# Patient Record
Sex: FEMALE | Race: WHITE | NOT HISPANIC OR LATINO | Employment: OTHER | URBAN - METROPOLITAN AREA
[De-identification: names, ages, dates, MRNs, and addresses within clinical notes are randomized per-mention and may not be internally consistent; named-entity substitution may affect disease eponyms.]

---

## 2020-08-03 ENCOUNTER — OFFICE VISIT (OUTPATIENT)
Dept: INTERNAL MEDICINE | Facility: CLINIC | Age: 40
End: 2020-08-03
Payer: MEDICAID

## 2020-08-03 VITALS
SYSTOLIC BLOOD PRESSURE: 114 MMHG | HEART RATE: 113 BPM | DIASTOLIC BLOOD PRESSURE: 63 MMHG | HEIGHT: 65 IN | WEIGHT: 138 LBS | BODY MASS INDEX: 22.99 KG/M2 | TEMPERATURE: 98 F | OXYGEN SATURATION: 97 %

## 2020-08-03 DIAGNOSIS — Z00.00 ROUTINE GENERAL MEDICAL EXAMINATION AT A HEALTH CARE FACILITY: Primary | ICD-10-CM

## 2020-08-03 DIAGNOSIS — I10 ESSENTIAL HYPERTENSION: ICD-10-CM

## 2020-08-03 DIAGNOSIS — G35 MULTIPLE SCLEROSIS: ICD-10-CM

## 2020-08-03 DIAGNOSIS — M62.838 MUSCLE SPASM: ICD-10-CM

## 2020-08-03 DIAGNOSIS — F33.42 RECURRENT MAJOR DEPRESSIVE DISORDER, IN FULL REMISSION: ICD-10-CM

## 2020-08-03 PROCEDURE — 99204 OFFICE O/P NEW MOD 45 MIN: CPT | Mod: S$GLB,,, | Performed by: INTERNAL MEDICINE

## 2020-08-03 PROCEDURE — 99204 PR OFFICE/OUTPT VISIT, NEW, LEVL IV, 45-59 MIN: ICD-10-PCS | Mod: S$GLB,,, | Performed by: INTERNAL MEDICINE

## 2020-08-03 RX ORDER — BACLOFEN 10 MG/1
TABLET ORAL
COMMUNITY
Start: 2020-07-15 | End: 2020-08-03

## 2020-08-03 RX ORDER — HYDROCHLOROTHIAZIDE 12.5 MG/1
TABLET ORAL
COMMUNITY
Start: 2020-07-18 | End: 2020-08-03

## 2020-08-03 RX ORDER — NATALIZUMAB 300 MG/15ML
INJECTION INTRAVENOUS
COMMUNITY
Start: 2020-07-27 | End: 2020-12-02

## 2020-08-03 RX ORDER — SERTRALINE HYDROCHLORIDE 100 MG/1
200 TABLET, FILM COATED ORAL DAILY
COMMUNITY
Start: 2020-07-03 | End: 2022-03-23

## 2020-08-03 RX ORDER — LAMOTRIGINE 100 MG/1
100 TABLET ORAL EVERY MORNING
COMMUNITY
Start: 2020-06-30

## 2020-08-03 RX ORDER — LISINOPRIL 10 MG/1
TABLET ORAL
COMMUNITY
Start: 2020-07-18 | End: 2020-08-18

## 2020-08-03 RX ORDER — SUMATRIPTAN SUCCINATE 100 MG/1
100 TABLET ORAL
COMMUNITY
Start: 2020-07-15 | End: 2023-04-07 | Stop reason: CLARIF

## 2020-08-03 RX ORDER — IBUPROFEN 800 MG/1
TABLET ORAL
COMMUNITY
Start: 2020-05-28 | End: 2020-12-02 | Stop reason: SDUPTHER

## 2020-08-03 RX ORDER — QUETIAPINE FUMARATE 200 MG/1
TABLET, FILM COATED ORAL
COMMUNITY
Start: 2020-07-28 | End: 2021-01-25 | Stop reason: DRUGHIGH

## 2020-08-03 RX ORDER — VERAPAMIL HYDROCHLORIDE 120 MG/1
120 TABLET, FILM COATED, EXTENDED RELEASE ORAL EVERY MORNING
COMMUNITY
Start: 2020-07-30 | End: 2021-05-11

## 2020-08-03 RX ORDER — TIZANIDINE 4 MG/1
4 TABLET ORAL EVERY 8 HOURS
Qty: 30 TABLET | Refills: 1 | Status: SHIPPED | OUTPATIENT
Start: 2020-08-03 | End: 2020-08-13

## 2020-08-03 RX ORDER — ONDANSETRON 4 MG/1
4 TABLET, ORALLY DISINTEGRATING ORAL EVERY 6 HOURS PRN
COMMUNITY
Start: 2020-05-28 | End: 2022-11-07 | Stop reason: SDUPTHER

## 2020-08-03 RX ORDER — TIZANIDINE 2 MG/1
TABLET ORAL
COMMUNITY
Start: 2020-08-02 | End: 2020-08-03

## 2020-08-03 RX ORDER — SODIUM CHLORIDE 9 MG/ML
INJECTION, SOLUTION INTRAVENOUS
COMMUNITY
Start: 2020-07-27 | End: 2021-01-25

## 2020-08-03 RX ORDER — DOXEPIN HYDROCHLORIDE 75 MG/1
CAPSULE ORAL
COMMUNITY
Start: 2020-07-15 | End: 2020-08-18

## 2020-08-03 RX ORDER — HYDROCODONE BITARTRATE AND ACETAMINOPHEN 5; 325 MG/1; MG/1
1 TABLET ORAL EVERY 6 HOURS PRN
Qty: 30 TABLET | Refills: 0 | Status: SHIPPED | OUTPATIENT
Start: 2020-08-03 | End: 2020-08-18

## 2020-08-03 NOTE — PROGRESS NOTES
Subjective:      Patient ID: Janis Reese is a 39 y.o. female.    Chief Complaint: Establish Care    HPI:  Patient with history of multiple sclerosis diagnosed at age of 22.  Patient has bilateral leg weakness and diffuse muscle pains.  Patient is being followed by neurologist in [place] Dr. Acevedo.  Patient reports worsening symptoms.  Patient is mostly wheelchair bound.  Patient reports diffuse muscular spasm specially in arms,     Patient has history of hypertension.  Patient blood pressure seem under okay control.  Her patient heart rate is running high today that is attributed to pain.  Patient is on lisinopril 10, verapamil 120, hydrochlorothiazide 12.5 mg.  Patient denies any chest pain, ankle swelling, shortness of breath    Patient reports depression for long.  Patient has crying spells, lack of energy/interest, has feeling of guilt and worthlessness, no suicidal or homicidal ideation,       Review of Systems   Constitutional: Negative for chills, diaphoresis, fever, malaise/fatigue and weight loss.   HENT: Negative for congestion, ear pain, sinus pain, sore throat and tinnitus.    Eyes: Negative for blurred vision and photophobia.   Respiratory: Negative for cough, hemoptysis, shortness of breath and wheezing.    Cardiovascular: Negative for chest pain, palpitations, orthopnea, leg swelling and PND.   Gastrointestinal: Negative for abdominal pain, blood in stool, constipation, diarrhea, heartburn, melena, nausea and vomiting.   Genitourinary: Negative for dysuria, frequency and urgency.   Musculoskeletal: Positive for myalgias. Negative for back pain and neck pain.        Muscle spasm   Skin: Negative for rash.   Neurological: Positive for dizziness and tremors. Negative for seizures, loss of consciousness and weakness.   Endo/Heme/Allergies: Negative for polydipsia.   Psychiatric/Behavioral: Positive for depression. Negative for hallucinations. The patient does not have insomnia.      Objective:      Physical Exam  Constitutional:       General: She is not in acute distress.     Appearance: She is not diaphoretic.   Neck:      Thyroid: No thyromegaly.   Cardiovascular:      Rate and Rhythm: Normal rate and regular rhythm.      Heart sounds: Normal heart sounds. No murmur.   Pulmonary:      Effort: Pulmonary effort is normal. No respiratory distress.      Breath sounds: Normal breath sounds. No wheezing.   Chest:      Chest wall: No tenderness.   Abdominal:      General: Bowel sounds are normal. There is no distension.      Palpations: Abdomen is soft.      Tenderness: There is no abdominal tenderness.   Musculoskeletal:         General: No tenderness.      Comments: The patient bilateral shoulder and arm are tender to palpation.  Patient upper body stiffens up frequently during interview.   Left leg strength is 1/5 while right leg strength is 3/5.  Bilateral arm strength is 3/5     Lymphadenopathy:      Cervical: No cervical adenopathy.   Neurological:      Mental Status: She is alert and oriented to person, place, and time.      Cranial Nerves: No cranial nerve deficit.      Sensory: No sensory deficit.   Psychiatric:         Behavior: Behavior normal.         Thought Content: Thought content normal.         Judgment: Judgment normal.       Assessment:       ICD-10-CM ICD-9-CM   1. Routine general medical examination at a health care facility  Z00.00 V70.0   2. Multiple sclerosis  G35 340   3. Essential hypertension  I10 401.9   4. Recurrent major depressive disorder, in full remission  F33.42 296.36   5. Muscle spasm  M62.838 728.85       Plan:   Will order Annual wellness exam labs.  Will refer to gyn for Pap smear.  Patient has muscle spasm probably secondary to multiple sclerosis that are not under control.  Will use tizanidine for muscle spasm.  Patient has severe  muscular pain as well..  Will use hydrocodone  Patient was previously using Klonopin 2 mg and fentanyl patch for muscle spasm and muscle  pains  Consult patient in detail that I will not be able to continue pain management.   If patient needs further care she may find a pain management and I will refer  Patient depression does not seem under good control..  Will refer to psychiatrist for further evaluation      Medication List with Changes/Refills   New Medications    HYDROCODONE-ACETAMINOPHEN (NORCO) 5-325 MG PER TABLET    Take 1 tablet by mouth every 6 (six) hours as needed for Pain.    TIZANIDINE (ZANAFLEX) 4 MG TABLET    Take 1 tablet (4 mg total) by mouth every 8 (eight) hours. for 10 days   Current Medications    0.9 % SODIUM CHLORIDE (SODIUM CHLORIDE 0.9%) SOLUTION        DOXEPIN (SINEQUAN) 75 MG CAPSULE        IBUPROFEN (ADVIL,MOTRIN) 800 MG TABLET        LAMOTRIGINE (LAMICTAL) 100 MG TABLET        LISINOPRIL 10 MG TABLET        ONDANSETRON (ZOFRAN-ODT) 4 MG TBDL        QUETIAPINE (SEROQUEL) 200 MG TAB        SERTRALINE (ZOLOFT) 100 MG TABLET        SUMATRIPTAN (IMITREX) 100 MG TABLET        TYSABRI 300 MG/15 ML INJECTION        VERAPAMIL (CALAN-SR) 120 MG CR TABLET       Discontinued Medications    BACLOFEN (LIORESAL) 10 MG TABLET        HYDROCHLOROTHIAZIDE (HYDRODIURIL) 12.5 MG TAB        TIZANIDINE (ZANAFLEX) 2 MG TABLET

## 2020-08-18 ENCOUNTER — OFFICE VISIT (OUTPATIENT)
Dept: INTERNAL MEDICINE | Facility: CLINIC | Age: 40
End: 2020-08-18
Payer: MEDICAID

## 2020-08-18 VITALS
BODY MASS INDEX: 23.39 KG/M2 | HEART RATE: 92 BPM | OXYGEN SATURATION: 97 % | SYSTOLIC BLOOD PRESSURE: 113 MMHG | TEMPERATURE: 97 F | WEIGHT: 140.38 LBS | DIASTOLIC BLOOD PRESSURE: 80 MMHG | HEIGHT: 65 IN

## 2020-08-18 DIAGNOSIS — G47.00 INSOMNIA, UNSPECIFIED TYPE: ICD-10-CM

## 2020-08-18 DIAGNOSIS — G35 MULTIPLE SCLEROSIS: Primary | ICD-10-CM

## 2020-08-18 PROCEDURE — 99214 OFFICE O/P EST MOD 30 MIN: CPT | Mod: S$GLB,,, | Performed by: INTERNAL MEDICINE

## 2020-08-18 PROCEDURE — 99214 PR OFFICE/OUTPT VISIT, EST, LEVL IV, 30-39 MIN: ICD-10-PCS | Mod: S$GLB,,, | Performed by: INTERNAL MEDICINE

## 2020-08-18 RX ORDER — TRAZODONE HYDROCHLORIDE 50 MG/1
50 TABLET ORAL NIGHTLY
Qty: 30 TABLET | Refills: 11 | Status: SHIPPED | OUTPATIENT
Start: 2020-08-18 | End: 2021-01-25 | Stop reason: ALTCHOICE

## 2020-08-18 RX ORDER — HYDROCODONE BITARTRATE AND ACETAMINOPHEN 7.5; 325 MG/1; MG/1
1 TABLET ORAL EVERY 12 HOURS PRN
Qty: 45 TABLET | Refills: 0 | Status: SHIPPED | OUTPATIENT
Start: 2020-08-18 | End: 2020-12-02 | Stop reason: SDUPTHER

## 2020-08-18 RX ORDER — BACLOFEN 10 MG/1
TABLET ORAL
COMMUNITY
Start: 2020-08-10 | End: 2021-01-25

## 2020-08-18 RX ORDER — TIZANIDINE 2 MG/1
2 TABLET ORAL EVERY 6 HOURS PRN
COMMUNITY
End: 2022-07-22

## 2020-08-18 NOTE — PROGRESS NOTES
Subjective:      Patient ID: Janis Reese is a 39 y.o. female.    Chief Complaint: Follow-up     Patient with history of multiple sclerosis diagnosed at age of 22.  Patient has bilateral leg weakness and diffuse muscle pains.  Patient is being followed by neurologist in [place] Dr. Acevedo.  Patient reports worsening symptoms.  Patient is mostly wheelchair bound.  Patient reports diffuse muscular spasm specially in arms,     Patient has history of hypertension.  Patient blood pressure seem under okay control.  Her patient heart rate is running high today that is attributed to pain.  Patient is on lisinopril 10, verapamil 120,   Patient denies any chest pain, ankle swelling, shortness of breath    Patient reports depression for long.  Patient has crying spells, lack of energy/interest, has feeling of guilt and worthlessness, no suicidal or homicidal ideation. Patient reports symptoms are improved. Patient goes to bed at 10pm and is not able to sleep and gets out of bed at about 9am. Occasional day time naps.     Review of Systems   Constitutional: Negative for chills, diaphoresis, fever, malaise/fatigue and weight loss.   HENT: Negative for congestion, ear pain, sinus pain, sore throat and tinnitus.    Eyes: Negative for blurred vision and photophobia.   Respiratory: Negative for cough, hemoptysis, shortness of breath and wheezing.    Cardiovascular: Negative for chest pain, palpitations, orthopnea, leg swelling and PND.   Gastrointestinal: Negative for abdominal pain, blood in stool, constipation, diarrhea, heartburn, melena, nausea and vomiting.   Genitourinary: Negative for dysuria, frequency and urgency.   Musculoskeletal: Positive for myalgias. Negative for back pain and neck pain.        Muscle spasm  Diffuse muscular pain in the Arms and legs   Skin: Negative for rash.   Neurological: Positive for tremors. Negative for dizziness, seizures, loss of consciousness and weakness.   Endo/Heme/Allergies: Negative  for polydipsia.   Psychiatric/Behavioral: Positive for depression. Negative for hallucinations. The patient does not have insomnia.      Objective:     Physical Exam  Constitutional:       General: She is not in acute distress.     Appearance: She is not diaphoretic.   Neck:      Thyroid: No thyromegaly.   Cardiovascular:      Rate and Rhythm: Normal rate and regular rhythm.      Heart sounds: Normal heart sounds. No murmur.   Pulmonary:      Effort: Pulmonary effort is normal. No respiratory distress.      Breath sounds: Normal breath sounds. No wheezing.   Chest:      Chest wall: No tenderness.   Abdominal:      General: Bowel sounds are normal. There is no distension.      Palpations: Abdomen is soft.      Tenderness: There is no abdominal tenderness.   Musculoskeletal:         General: No tenderness.      Comments: The patient bilateral shoulder and arm are tender to palpation.  Patient upper body stiffens up frequently during interview.   Left leg strength is 1/5 while right leg strength is 3/5.  Bilateral arm strength is 3/5     Lymphadenopathy:      Cervical: No cervical adenopathy.   Neurological:      Mental Status: She is alert and oriented to person, place, and time.      Cranial Nerves: No cranial nerve deficit.      Sensory: No sensory deficit.      Comments: Patient has constant tremors.   Psychiatric:         Behavior: Behavior normal.         Thought Content: Thought content normal.         Judgment: Judgment normal.       Assessment:       ICD-10-CM ICD-9-CM   1. Multiple sclerosis  G35 340   2. Insomnia, unspecified type  G47.00 780.52       Plan:     Patient muscle spasms are improved with Tizanidine. Will continue medications.   Will stop lisinopril.   Patient still complains of diffuse muscle pain.  That is controlled with hydrocodone but it is sometime needs 2 tablets  Will increase the dose of hydrocodone to 7.5 mg and prescribed 45 tablets for 30 days  Advised patient about possible side  effect of the medication.  Will get pain management contract sign and will check urine drug screen  Patient complains of insomnia that is not controlled with doxepin.  Will changed to trazodone  Advised patient to keep appointment with psychiatry      Medication List with Changes/Refills   New Medications    HYDROCODONE-ACETAMINOPHEN (NORCO) 7.5-325 MG PER TABLET    Take 1 tablet by mouth every 12 (twelve) hours as needed for Pain.    TRAZODONE (DESYREL) 50 MG TABLET    Take 1 tablet (50 mg total) by mouth every evening.   Current Medications    0.9 % SODIUM CHLORIDE (SODIUM CHLORIDE 0.9%) SOLUTION        BACLOFEN (LIORESAL) 10 MG TABLET        IBUPROFEN (ADVIL,MOTRIN) 800 MG TABLET        LAMOTRIGINE (LAMICTAL) 100 MG TABLET        ONDANSETRON (ZOFRAN-ODT) 4 MG TBDL        QUETIAPINE (SEROQUEL) 200 MG TAB        SERTRALINE (ZOLOFT) 100 MG TABLET        SUMATRIPTAN (IMITREX) 100 MG TABLET        TIZANIDINE (ZANAFLEX) 2 MG TABLET    Take 2 mg by mouth every 6 (six) hours as needed. Take 3 tablets 3 times daily    TYSABRI 300 MG/15 ML INJECTION        VERAPAMIL (CALAN-SR) 120 MG CR TABLET       Discontinued Medications    DOXEPIN (SINEQUAN) 75 MG CAPSULE        HYDROCODONE-ACETAMINOPHEN (NORCO) 5-325 MG PER TABLET    Take 1 tablet by mouth every 6 (six) hours as needed for Pain.    LISINOPRIL 10 MG TABLET

## 2020-08-19 DIAGNOSIS — G35 MULTIPLE SCLEROSIS: Primary | ICD-10-CM

## 2020-12-02 ENCOUNTER — OFFICE VISIT (OUTPATIENT)
Dept: PRIMARY CARE CLINIC | Facility: CLINIC | Age: 40
End: 2020-12-02
Payer: MEDICAID

## 2020-12-02 VITALS
WEIGHT: 141.38 LBS | OXYGEN SATURATION: 98 % | DIASTOLIC BLOOD PRESSURE: 88 MMHG | TEMPERATURE: 99 F | HEIGHT: 65 IN | SYSTOLIC BLOOD PRESSURE: 118 MMHG | HEART RATE: 102 BPM | BODY MASS INDEX: 23.56 KG/M2

## 2020-12-02 DIAGNOSIS — F33.42 RECURRENT MAJOR DEPRESSIVE DISORDER, IN FULL REMISSION: ICD-10-CM

## 2020-12-02 DIAGNOSIS — I10 ESSENTIAL HYPERTENSION: ICD-10-CM

## 2020-12-02 DIAGNOSIS — M79.10 MUSCLE PAIN: Primary | ICD-10-CM

## 2020-12-02 DIAGNOSIS — G35 MULTIPLE SCLEROSIS: ICD-10-CM

## 2020-12-02 PROCEDURE — 90471 FLU VACCINE (QUAD) GREATER THAN OR EQUAL TO 3YO PRESERVATIVE FREE IM: ICD-10-PCS | Mod: S$GLB,,, | Performed by: INTERNAL MEDICINE

## 2020-12-02 PROCEDURE — 99214 OFFICE O/P EST MOD 30 MIN: CPT | Mod: 25,S$GLB,, | Performed by: INTERNAL MEDICINE

## 2020-12-02 PROCEDURE — 90686 IIV4 VACC NO PRSV 0.5 ML IM: CPT | Mod: S$GLB,,, | Performed by: INTERNAL MEDICINE

## 2020-12-02 PROCEDURE — 90471 IMMUNIZATION ADMIN: CPT | Mod: S$GLB,,, | Performed by: INTERNAL MEDICINE

## 2020-12-02 PROCEDURE — 90686 FLU VACCINE (QUAD) GREATER THAN OR EQUAL TO 3YO PRESERVATIVE FREE IM: ICD-10-PCS | Mod: S$GLB,,, | Performed by: INTERNAL MEDICINE

## 2020-12-02 PROCEDURE — 99214 PR OFFICE/OUTPT VISIT, EST, LEVL IV, 30-39 MIN: ICD-10-PCS | Mod: 25,S$GLB,, | Performed by: INTERNAL MEDICINE

## 2020-12-02 RX ORDER — HYDROCODONE BITARTRATE AND ACETAMINOPHEN 7.5; 325 MG/1; MG/1
1 TABLET ORAL EVERY 12 HOURS PRN
Qty: 45 TABLET | Refills: 0 | Status: SHIPPED | OUTPATIENT
Start: 2020-12-02 | End: 2020-12-07 | Stop reason: SDUPTHER

## 2020-12-02 RX ORDER — IBUPROFEN 800 MG/1
800 TABLET ORAL EVERY 8 HOURS PRN
Qty: 90 TABLET | Refills: 1 | Status: SHIPPED | OUTPATIENT
Start: 2020-12-02 | End: 2021-03-02

## 2020-12-02 NOTE — PROGRESS NOTES
Subjective:      Patient ID: Janis Reese is a 40 y.o. female.    Chief Complaint: Follow-up and Medication Refill     Patient with history of multiple sclerosis diagnosed at age of 22.  Patient has bilateral leg weakness and diffuse muscle pains. Muscle pains are controlled with Hydrocodone/APAP. Patient is being followed by neurologist in [place] Dr. Acevedo.  Patient reports worsening symptoms.  Patient is mostly wheelchair bound.  Patient reports diffuse muscular spasm specially in arms.  Patient reports she has an active lesion in the spinal area + seizure-like activity for long.  Last episode was 2 upon list for EEG.    Patient has history of hypertension.  Patient blood pressure seem under okay control.  Her patient heart rate is running high today that is attributed to pain.  Patient is on lisinopril 10, verapamil 120,   Patient denies any chest pain, ankle swelling, shortness of breath    Patient reports depression for long.  Patient reports no crying spells but lack of energy/interest, has feeling of guilt and worthlessness, no suicidal or homicidal ideation. Patient reports symptoms are improved with Zoloft.       Review of Systems   Constitutional: Negative for chills, diaphoresis, fever, malaise/fatigue and weight loss.   HENT: Negative for congestion, ear pain, sinus pain, sore throat and tinnitus.    Eyes: Negative for blurred vision and photophobia.   Respiratory: Negative for cough, hemoptysis, shortness of breath and wheezing.    Cardiovascular: Negative for chest pain, palpitations, orthopnea, leg swelling and PND.   Gastrointestinal: Negative for abdominal pain, blood in stool, constipation, diarrhea, heartburn, melena, nausea and vomiting.   Genitourinary: Negative for dysuria, frequency and urgency.   Musculoskeletal: Positive for myalgias. Negative for back pain and neck pain.        Muscle spasm  Diffuse muscular pain in the Arms and legs   Skin: Negative for rash.   Neurological:  Positive for tremors and seizures. Negative for dizziness, loss of consciousness and weakness.   Endo/Heme/Allergies: Negative for polydipsia.   Psychiatric/Behavioral: Positive for depression. Negative for hallucinations. The patient does not have insomnia.      Objective:     Physical Exam  Constitutional:       General: She is not in acute distress.     Appearance: She is not diaphoretic.   Neck:      Thyroid: No thyromegaly.   Cardiovascular:      Rate and Rhythm: Normal rate and regular rhythm.      Heart sounds: Normal heart sounds. No murmur.   Pulmonary:      Effort: Pulmonary effort is normal. No respiratory distress.      Breath sounds: Normal breath sounds. No wheezing.   Chest:      Chest wall: No tenderness.   Abdominal:      General: Bowel sounds are normal. There is no distension.      Palpations: Abdomen is soft.      Tenderness: There is no abdominal tenderness.   Musculoskeletal:         General: No tenderness.      Comments: The patient bilateral shoulder and arm are tender to palpation.  Patient upper body stiffens up frequently during interview.   Left leg strength is 1/5 while right leg strength is 3/5.  Bilateral arm strength is 3/5     Lymphadenopathy:      Cervical: No cervical adenopathy.   Neurological:      Mental Status: She is alert and oriented to person, place, and time.      Cranial Nerves: No cranial nerve deficit.      Sensory: No sensory deficit.      Comments: Patient tremor seems to be improving.   Psychiatric:         Behavior: Behavior normal.         Thought Content: Thought content normal.         Judgment: Judgment normal.       Assessment:       ICD-10-CM ICD-9-CM   1. Muscle pain  M79.10 729.1   2. Multiple sclerosis  G35 340   3. Recurrent major depressive disorder, in full remission  F33.42 296.36   4. Essential hypertension  I10 401.9       Plan:     Patient muscle spasms are improved with Tizanidine. Will continue medications.   Patient still complains of diffuse  muscle pain.  That is controlled with hydrocodone as needed  Will prescribed hydrocodone to 7.5 mg and prescribed 45 tablets for 30 days  Patient depression seem better controlled but still there  Advised patient to find a psychiatrist Will refer  Patient blood pressures are under good control.  Will continue medicine      Medication List with Changes/Refills   Current Medications    0.9 % SODIUM CHLORIDE (SODIUM CHLORIDE 0.9%) SOLUTION        BACLOFEN (LIORESAL) 10 MG TABLET        LAMOTRIGINE (LAMICTAL) 100 MG TABLET        ONDANSETRON (ZOFRAN-ODT) 4 MG TBDL        QUETIAPINE (SEROQUEL) 200 MG TAB        SERTRALINE (ZOLOFT) 100 MG TABLET        SUMATRIPTAN (IMITREX) 100 MG TABLET        TIZANIDINE (ZANAFLEX) 2 MG TABLET    Take 2 mg by mouth every 6 (six) hours as needed. Take 3 tablets 3 times daily    TRAZODONE (DESYREL) 50 MG TABLET    Take 1 tablet (50 mg total) by mouth every evening.    VERAPAMIL (CALAN-SR) 120 MG CR TABLET       Changed and/or Refilled Medications    Modified Medication Previous Medication    HYDROCODONE-ACETAMINOPHEN (NORCO) 7.5-325 MG PER TABLET HYDROcodone-acetaminophen (NORCO) 7.5-325 mg per tablet       Take 1 tablet by mouth every 12 (twelve) hours as needed for Pain.    Take 1 tablet by mouth every 12 (twelve) hours as needed for Pain.    IBUPROFEN (ADVIL,MOTRIN) 800 MG TABLET ibuprofen (ADVIL,MOTRIN) 800 MG tablet       Take 1 tablet (800 mg total) by mouth every 8 (eight) hours as needed for Pain.       Discontinued Medications    TYSABRI 300 MG/15 ML INJECTION

## 2020-12-07 ENCOUNTER — TELEPHONE (OUTPATIENT)
Dept: PRIMARY CARE CLINIC | Facility: CLINIC | Age: 40
End: 2020-12-07

## 2020-12-07 DIAGNOSIS — G35 MULTIPLE SCLEROSIS: ICD-10-CM

## 2020-12-07 NOTE — TELEPHONE ENCOUNTER
----- Message from Joann Burgos sent at 12/7/2020  4:17 PM CST -----  Pt would like to verify office called to authorize prescription HYDROcodone-acetaminophen (NORCO) 7.5-325 mg per tablet. Please call back at [phone]

## 2020-12-07 NOTE — TELEPHONE ENCOUNTER
----- Message from [place] sent at 12/4/2020  4:21 PM CST -----  Regarding: Medication question  Please call Janis to discuss a medication question she has about hydrocodone [phone].

## 2020-12-08 RX ORDER — HYDROCODONE BITARTRATE AND ACETAMINOPHEN 7.5; 325 MG/1; MG/1
1 TABLET ORAL EVERY 12 HOURS PRN
Qty: 45 TABLET | Refills: 0 | Status: SHIPPED | OUTPATIENT
Start: 2020-12-08 | End: 2021-01-25 | Stop reason: SDUPTHER

## 2020-12-29 ENCOUNTER — CLINICAL SUPPORT (OUTPATIENT)
Dept: PRIMARY CARE CLINIC | Facility: CLINIC | Age: 40
End: 2020-12-29
Payer: MEDICAID

## 2020-12-29 DIAGNOSIS — U07.1 COVID-19 VIRUS INFECTION: Primary | ICD-10-CM

## 2020-12-29 DIAGNOSIS — U07.1 COVID-19 VIRUS DETECTED: ICD-10-CM

## 2020-12-29 DIAGNOSIS — R05.9 COUGH: Primary | ICD-10-CM

## 2020-12-29 LAB
CTP QC/QA: YES
SARS-COV-2 RDRP RESP QL NAA+PROBE: POSITIVE

## 2020-12-29 PROCEDURE — U0002 COVID-19 LAB TEST NON-CDC: HCPCS | Mod: QW,S$GLB,, | Performed by: INTERNAL MEDICINE

## 2020-12-29 PROCEDURE — U0002: ICD-10-PCS | Mod: QW,S$GLB,, | Performed by: INTERNAL MEDICINE

## 2020-12-29 RX ORDER — PROMETHAZINE HYDROCHLORIDE AND CODEINE PHOSPHATE 6.25; 1 MG/5ML; MG/5ML
5 SOLUTION ORAL EVERY 6 HOURS PRN
Qty: 240 ML | Refills: 0 | Status: SHIPPED | OUTPATIENT
Start: 2020-12-29 | End: 2021-01-08

## 2020-12-29 RX ORDER — PROMETHAZINE HYDROCHLORIDE AND DEXTROMETHORPHAN HYDROBROMIDE 6.25; 15 MG/5ML; MG/5ML
5 SYRUP ORAL EVERY 4 HOURS PRN
Status: CANCELLED | OUTPATIENT
Start: 2020-12-29 | End: 2021-01-08

## 2020-12-29 RX ORDER — AZITHROMYCIN 250 MG/1
500 TABLET, FILM COATED ORAL DAILY
Status: CANCELLED | OUTPATIENT
Start: 2020-12-29

## 2020-12-29 RX ORDER — ALBUTEROL SULFATE 90 UG/1
2 AEROSOL, METERED RESPIRATORY (INHALATION) EVERY 6 HOURS PRN
Qty: 18 G | Refills: 0 | Status: SHIPPED | OUTPATIENT
Start: 2020-12-29 | End: 2021-01-21

## 2020-12-29 RX ORDER — AZITHROMYCIN 250 MG/1
TABLET, FILM COATED ORAL
Qty: 6 TABLET | Refills: 0 | Status: SHIPPED | OUTPATIENT
Start: 2020-12-29 | End: 2021-01-03

## 2020-12-29 RX ORDER — ALBUTEROL SULFATE 90 UG/1
2 AEROSOL, METERED RESPIRATORY (INHALATION) EVERY 6 HOURS PRN
Qty: 18 G | Refills: 0 | Status: CANCELLED | OUTPATIENT
Start: 2020-12-29 | End: 2021-12-29

## 2021-01-25 ENCOUNTER — OFFICE VISIT (OUTPATIENT)
Dept: PRIMARY CARE CLINIC | Facility: CLINIC | Age: 41
End: 2021-01-25
Payer: MEDICAID

## 2021-01-25 VITALS
DIASTOLIC BLOOD PRESSURE: 86 MMHG | HEART RATE: 96 BPM | RESPIRATION RATE: 16 BRPM | BODY MASS INDEX: 29.25 KG/M2 | OXYGEN SATURATION: 98 % | HEIGHT: 60 IN | SYSTOLIC BLOOD PRESSURE: 124 MMHG | WEIGHT: 149 LBS | TEMPERATURE: 98 F

## 2021-01-25 DIAGNOSIS — G35 MULTIPLE SCLEROSIS: Primary | ICD-10-CM

## 2021-01-25 DIAGNOSIS — F33.42 RECURRENT MAJOR DEPRESSIVE DISORDER, IN FULL REMISSION: ICD-10-CM

## 2021-01-25 DIAGNOSIS — I10 ESSENTIAL HYPERTENSION: ICD-10-CM

## 2021-01-25 DIAGNOSIS — E87.6 HYPOKALEMIA: ICD-10-CM

## 2021-01-25 PROCEDURE — 99214 OFFICE O/P EST MOD 30 MIN: CPT | Mod: S$GLB,,, | Performed by: INTERNAL MEDICINE

## 2021-01-25 PROCEDURE — 99214 PR OFFICE/OUTPT VISIT, EST, LEVL IV, 30-39 MIN: ICD-10-PCS | Mod: S$GLB,,, | Performed by: INTERNAL MEDICINE

## 2021-01-25 RX ORDER — HYDROCORTISONE SODIUM SUCCINATE 100 MG/2ML
INJECTION, POWDER, FOR SOLUTION INTRAMUSCULAR; INTRAVENOUS
COMMUNITY
Start: 2020-12-09 | End: 2021-06-16

## 2021-01-25 RX ORDER — QUETIAPINE FUMARATE 200 MG/1
200 TABLET, FILM COATED ORAL NIGHTLY
Status: ON HOLD | COMMUNITY
Start: 2021-01-20 | End: 2023-04-09 | Stop reason: SDUPTHER

## 2021-01-25 RX ORDER — OFATUMUMAB 20 MG/.4ML
INJECTION, SOLUTION SUBCUTANEOUS
COMMUNITY
Start: 2021-01-21 | End: 2023-04-07 | Stop reason: CLARIF

## 2021-01-25 RX ORDER — ERGOCALCIFEROL 1.25 MG/1
2 CAPSULE ORAL
COMMUNITY
Start: 2021-01-18 | End: 2022-11-07

## 2021-01-25 RX ORDER — HEPARIN SODIUM,PORCINE/PF 10 UNIT/ML
SYRINGE (ML) INTRAVENOUS
COMMUNITY
Start: 2020-12-10 | End: 2021-06-16

## 2021-01-25 RX ORDER — HEPARIN 100 UNIT/ML
SYRINGE INTRAVENOUS
COMMUNITY
Start: 2020-12-04 | End: 2023-04-07 | Stop reason: CLARIF

## 2021-01-25 RX ORDER — FAMOTIDINE 20 MG/1
1 TABLET, FILM COATED ORAL 2 TIMES DAILY
COMMUNITY
Start: 2021-01-05 | End: 2021-06-16

## 2021-01-25 RX ORDER — SODIUM CHLORIDE 0.9 % (FLUSH) 0.9 %
SYRINGE (ML) INJECTION
COMMUNITY
Start: 2020-12-10 | End: 2023-04-07 | Stop reason: CLARIF

## 2021-01-25 RX ORDER — POTASSIUM CHLORIDE 1500 MG/1
1 TABLET, EXTENDED RELEASE ORAL DAILY
COMMUNITY
Start: 2020-12-11 | End: 2021-06-16

## 2021-01-25 RX ORDER — HYDROCODONE BITARTRATE AND ACETAMINOPHEN 7.5; 325 MG/1; MG/1
1 TABLET ORAL EVERY 12 HOURS PRN
Qty: 45 TABLET | Refills: 0 | Status: SHIPPED | OUTPATIENT
Start: 2021-01-25 | End: 2021-02-24 | Stop reason: SDUPTHER

## 2021-01-27 LAB
AMPHETAMINES UR QL SCN>1000 NG/ML: NEGATIVE
BARBITURATES UR QL: NEGATIVE
BENZODIAZ UR QL: NEGATIVE
BZE UR QL: NEGATIVE
COMMENT: NORMAL
ETHANOL UR QL: NEGATIVE
METHADONE UR QL: NEGATIVE
METHAQUALONE UR QL: NEGATIVE
OPIATES UR QL: NEGATIVE
PCP UR QL: NEGATIVE
PROPOXYPH UR QL: NEGATIVE
SERVICE CMNT 02-IMP: NORMAL
THC UR QL SCN>50 NG/ML: NEGATIVE

## 2021-02-24 ENCOUNTER — OFFICE VISIT (OUTPATIENT)
Dept: PRIMARY CARE CLINIC | Facility: CLINIC | Age: 41
End: 2021-02-24
Payer: MEDICAID

## 2021-02-24 VITALS
HEART RATE: 95 BPM | TEMPERATURE: 98 F | BODY MASS INDEX: 30.04 KG/M2 | WEIGHT: 153 LBS | HEIGHT: 60 IN | RESPIRATION RATE: 16 BRPM | OXYGEN SATURATION: 96 % | SYSTOLIC BLOOD PRESSURE: 132 MMHG | DIASTOLIC BLOOD PRESSURE: 95 MMHG

## 2021-02-24 DIAGNOSIS — I10 ESSENTIAL HYPERTENSION: ICD-10-CM

## 2021-02-24 DIAGNOSIS — G35 MULTIPLE SCLEROSIS: Primary | ICD-10-CM

## 2021-02-24 DIAGNOSIS — N39.0 URINARY TRACT INFECTION WITHOUT HEMATURIA, SITE UNSPECIFIED: ICD-10-CM

## 2021-02-24 DIAGNOSIS — F33.42 RECURRENT MAJOR DEPRESSIVE DISORDER, IN FULL REMISSION: ICD-10-CM

## 2021-02-24 PROCEDURE — 99214 PR OFFICE/OUTPT VISIT, EST, LEVL IV, 30-39 MIN: ICD-10-PCS | Mod: S$GLB,,, | Performed by: INTERNAL MEDICINE

## 2021-02-24 PROCEDURE — 99214 OFFICE O/P EST MOD 30 MIN: CPT | Mod: S$GLB,,, | Performed by: INTERNAL MEDICINE

## 2021-02-24 RX ORDER — HYDROCODONE BITARTRATE AND ACETAMINOPHEN 7.5; 325 MG/1; MG/1
1 TABLET ORAL EVERY 12 HOURS PRN
Qty: 45 TABLET | Refills: 0 | Status: SHIPPED | OUTPATIENT
Start: 2021-02-24 | End: 2021-05-11 | Stop reason: SDUPTHER

## 2021-02-24 RX ORDER — NITROFURANTOIN 25; 75 MG/1; MG/1
100 CAPSULE ORAL 2 TIMES DAILY
Qty: 14 CAPSULE | Refills: 0 | Status: SHIPPED | OUTPATIENT
Start: 2021-02-24 | End: 2021-05-11

## 2021-02-25 ENCOUNTER — TELEPHONE (OUTPATIENT)
Dept: PRIMARY CARE CLINIC | Facility: CLINIC | Age: 41
End: 2021-02-25

## 2021-02-25 LAB
APPEARANCE UR: CLEAR
BACTERIA #/AREA URNS HPF: ABNORMAL /HPF
BACTERIA UR CULT: ABNORMAL
BILIRUB UR QL STRIP: NEGATIVE
COLOR UR: YELLOW
GLUCOSE UR QL STRIP: NEGATIVE
HGB UR QL STRIP: NEGATIVE
HYALINE CASTS #/AREA URNS LPF: ABNORMAL /LPF
KETONES UR QL STRIP: NEGATIVE
LEUKOCYTE ESTERASE UR QL STRIP: NEGATIVE
NITRITE UR QL STRIP: NEGATIVE
PH UR STRIP: 5.5 [PH] (ref 5–8)
PROT UR QL STRIP: NEGATIVE
RBC #/AREA URNS HPF: ABNORMAL /HPF
SP GR UR STRIP: 1.02 (ref 1–1.03)
SQUAMOUS #/AREA URNS HPF: ABNORMAL /HPF
WBC #/AREA URNS HPF: ABNORMAL /HPF

## 2021-03-19 ENCOUNTER — IMMUNIZATION (OUTPATIENT)
Dept: HEMATOLOGY/ONCOLOGY | Facility: CLINIC | Age: 41
End: 2021-03-19
Payer: MEDICAID

## 2021-03-19 DIAGNOSIS — Z23 NEED FOR VACCINATION: Primary | ICD-10-CM

## 2021-03-19 PROCEDURE — 91300 COVID-19, MRNA, LNP-S, PF, 30 MCG/0.3 ML DOSE VACCINE: CPT | Mod: S$GLB,,, | Performed by: FAMILY MEDICINE

## 2021-03-19 PROCEDURE — 91300 COVID-19, MRNA, LNP-S, PF, 30 MCG/0.3 ML DOSE VACCINE: ICD-10-PCS | Mod: S$GLB,,, | Performed by: FAMILY MEDICINE

## 2021-03-19 PROCEDURE — 0001A COVID-19, MRNA, LNP-S, PF, 30 MCG/0.3 ML DOSE VACCINE: CPT | Mod: CV19,S$GLB,, | Performed by: FAMILY MEDICINE

## 2021-03-19 PROCEDURE — 0001A COVID-19, MRNA, LNP-S, PF, 30 MCG/0.3 ML DOSE VACCINE: ICD-10-PCS | Mod: CV19,S$GLB,, | Performed by: FAMILY MEDICINE

## 2021-04-09 ENCOUNTER — IMMUNIZATION (OUTPATIENT)
Dept: HEMATOLOGY/ONCOLOGY | Facility: CLINIC | Age: 41
End: 2021-04-09
Payer: MEDICAID

## 2021-04-09 DIAGNOSIS — Z23 NEED FOR VACCINATION: Primary | ICD-10-CM

## 2021-04-09 PROCEDURE — 0002A COVID-19, MRNA, LNP-S, PF, 30 MCG/0.3 ML DOSE VACCINE: ICD-10-PCS | Mod: CV19,S$GLB,, | Performed by: FAMILY MEDICINE

## 2021-04-09 PROCEDURE — 0002A COVID-19, MRNA, LNP-S, PF, 30 MCG/0.3 ML DOSE VACCINE: CPT | Mod: CV19,S$GLB,, | Performed by: FAMILY MEDICINE

## 2021-04-09 PROCEDURE — 91300 COVID-19, MRNA, LNP-S, PF, 30 MCG/0.3 ML DOSE VACCINE: CPT | Mod: S$GLB,,, | Performed by: FAMILY MEDICINE

## 2021-04-09 PROCEDURE — 91300 COVID-19, MRNA, LNP-S, PF, 30 MCG/0.3 ML DOSE VACCINE: ICD-10-PCS | Mod: S$GLB,,, | Performed by: FAMILY MEDICINE

## 2021-04-23 ENCOUNTER — PATIENT OUTREACH (OUTPATIENT)
Dept: ADMINISTRATIVE | Facility: HOSPITAL | Age: 41
End: 2021-04-23

## 2021-05-11 ENCOUNTER — OFFICE VISIT (OUTPATIENT)
Dept: PRIMARY CARE CLINIC | Facility: CLINIC | Age: 41
End: 2021-05-11
Payer: MEDICAID

## 2021-05-11 VITALS
BODY MASS INDEX: 31.22 KG/M2 | HEART RATE: 109 BPM | DIASTOLIC BLOOD PRESSURE: 107 MMHG | RESPIRATION RATE: 18 BRPM | OXYGEN SATURATION: 98 % | SYSTOLIC BLOOD PRESSURE: 158 MMHG | HEIGHT: 60 IN | WEIGHT: 159 LBS

## 2021-05-11 DIAGNOSIS — I10 ESSENTIAL HYPERTENSION: ICD-10-CM

## 2021-05-11 DIAGNOSIS — M79.10 MUSCLE PAIN: Primary | ICD-10-CM

## 2021-05-11 DIAGNOSIS — G35 MULTIPLE SCLEROSIS: ICD-10-CM

## 2021-05-11 DIAGNOSIS — F31.9 BIPOLAR AFFECTIVE DISORDER, REMISSION STATUS UNSPECIFIED: ICD-10-CM

## 2021-05-11 PROCEDURE — 99214 PR OFFICE/OUTPT VISIT, EST, LEVL IV, 30-39 MIN: ICD-10-PCS | Mod: S$GLB,,, | Performed by: INTERNAL MEDICINE

## 2021-05-11 PROCEDURE — 99214 OFFICE O/P EST MOD 30 MIN: CPT | Mod: S$GLB,,, | Performed by: INTERNAL MEDICINE

## 2021-05-11 RX ORDER — HYDROCODONE BITARTRATE AND ACETAMINOPHEN 7.5; 325 MG/1; MG/1
1 TABLET ORAL EVERY 12 HOURS PRN
Qty: 45 TABLET | Refills: 0 | Status: SHIPPED | OUTPATIENT
Start: 2021-05-11 | End: 2021-06-16 | Stop reason: SDUPTHER

## 2021-05-11 RX ORDER — VERAPAMIL HYDROCHLORIDE 240 MG/1
240 CAPSULE, EXTENDED RELEASE ORAL DAILY
Qty: 30 CAPSULE | Refills: 11 | Status: SHIPPED | OUTPATIENT
Start: 2021-05-11 | End: 2022-03-23

## 2021-05-14 ENCOUNTER — TELEPHONE (OUTPATIENT)
Dept: PRIMARY CARE CLINIC | Facility: CLINIC | Age: 41
End: 2021-05-14

## 2021-05-14 DIAGNOSIS — I10 ESSENTIAL HYPERTENSION: Primary | ICD-10-CM

## 2021-05-14 RX ORDER — LOSARTAN POTASSIUM AND HYDROCHLOROTHIAZIDE 12.5; 5 MG/1; MG/1
1 TABLET ORAL DAILY
Qty: 30 TABLET | Refills: 3 | Status: SHIPPED | OUTPATIENT
Start: 2021-05-14 | End: 2021-05-25

## 2021-05-25 ENCOUNTER — OFFICE VISIT (OUTPATIENT)
Dept: PRIMARY CARE CLINIC | Facility: CLINIC | Age: 41
End: 2021-05-25
Payer: MEDICAID

## 2021-05-25 VITALS
SYSTOLIC BLOOD PRESSURE: 134 MMHG | RESPIRATION RATE: 14 BRPM | OXYGEN SATURATION: 98 % | BODY MASS INDEX: 30.21 KG/M2 | HEART RATE: 108 BPM | DIASTOLIC BLOOD PRESSURE: 82 MMHG | WEIGHT: 160 LBS | HEIGHT: 61 IN | TEMPERATURE: 98 F

## 2021-05-25 DIAGNOSIS — F31.9 BIPOLAR AFFECTIVE DISORDER, REMISSION STATUS UNSPECIFIED: ICD-10-CM

## 2021-05-25 DIAGNOSIS — R35.0 FREQUENCY OF URINATION: Primary | ICD-10-CM

## 2021-05-25 DIAGNOSIS — I10 ESSENTIAL HYPERTENSION: ICD-10-CM

## 2021-05-25 PROCEDURE — 99214 OFFICE O/P EST MOD 30 MIN: CPT | Mod: S$GLB,,, | Performed by: INTERNAL MEDICINE

## 2021-05-25 PROCEDURE — 99214 PR OFFICE/OUTPT VISIT, EST, LEVL IV, 30-39 MIN: ICD-10-PCS | Mod: S$GLB,,, | Performed by: INTERNAL MEDICINE

## 2021-05-26 LAB
APPEARANCE UR: CLEAR
BACTERIA #/AREA URNS HPF: ABNORMAL /HPF
BACTERIA UR CULT: ABNORMAL
BILIRUB UR QL STRIP: NEGATIVE
CAOX CRY #/AREA URNS HPF: ABNORMAL /HPF
COLOR UR: YELLOW
GLUCOSE UR QL STRIP: NEGATIVE
HGB UR QL STRIP: ABNORMAL
HYALINE CASTS #/AREA URNS LPF: ABNORMAL /LPF
KETONES UR QL STRIP: NEGATIVE
LEUKOCYTE ESTERASE UR QL STRIP: NEGATIVE
NITRITE UR QL STRIP: NEGATIVE
PH UR STRIP: 6 [PH] (ref 5–8)
PROT UR QL STRIP: ABNORMAL
RBC #/AREA URNS HPF: ABNORMAL /HPF
SP GR UR STRIP: 1.02 (ref 1–1.03)
SQUAMOUS #/AREA URNS HPF: ABNORMAL /HPF
WBC #/AREA URNS HPF: ABNORMAL /HPF

## 2021-06-16 ENCOUNTER — OFFICE VISIT (OUTPATIENT)
Dept: PRIMARY CARE CLINIC | Facility: CLINIC | Age: 41
End: 2021-06-16
Payer: MEDICAID

## 2021-06-16 VITALS
BODY MASS INDEX: 30.21 KG/M2 | DIASTOLIC BLOOD PRESSURE: 97 MMHG | RESPIRATION RATE: 16 BRPM | SYSTOLIC BLOOD PRESSURE: 142 MMHG | HEIGHT: 61 IN | TEMPERATURE: 98 F | HEART RATE: 102 BPM | OXYGEN SATURATION: 98 % | WEIGHT: 160 LBS

## 2021-06-16 DIAGNOSIS — F43.9 FEELING STRESSED OUT: Primary | ICD-10-CM

## 2021-06-16 DIAGNOSIS — K92.1 MELENA: ICD-10-CM

## 2021-06-16 DIAGNOSIS — R00.0 TACHYCARDIA: ICD-10-CM

## 2021-06-16 DIAGNOSIS — I10 ESSENTIAL HYPERTENSION: ICD-10-CM

## 2021-06-16 DIAGNOSIS — G35 MULTIPLE SCLEROSIS: ICD-10-CM

## 2021-06-16 PROBLEM — F31.9 BIPOLAR 1 DISORDER: Status: ACTIVE | Noted: 2020-08-03

## 2021-06-16 PROCEDURE — 99214 PR OFFICE/OUTPT VISIT, EST, LEVL IV, 30-39 MIN: ICD-10-PCS | Mod: S$GLB,,, | Performed by: INTERNAL MEDICINE

## 2021-06-16 PROCEDURE — 99214 OFFICE O/P EST MOD 30 MIN: CPT | Mod: S$GLB,,, | Performed by: INTERNAL MEDICINE

## 2021-06-16 RX ORDER — OMEPRAZOLE 40 MG/1
40 CAPSULE, DELAYED RELEASE ORAL DAILY
Qty: 30 CAPSULE | Refills: 2 | Status: SHIPPED | OUTPATIENT
Start: 2021-06-16 | End: 2021-12-08

## 2021-06-16 RX ORDER — LOSARTAN POTASSIUM 50 MG/1
50 TABLET ORAL DAILY
Qty: 90 TABLET | Refills: 3 | Status: SHIPPED | OUTPATIENT
Start: 2021-06-16 | End: 2021-08-03

## 2021-06-16 RX ORDER — HYDROCODONE BITARTRATE AND ACETAMINOPHEN 7.5; 325 MG/1; MG/1
1 TABLET ORAL EVERY 12 HOURS PRN
Qty: 45 TABLET | Refills: 0 | Status: SHIPPED | OUTPATIENT
Start: 2021-06-16 | End: 2021-08-03 | Stop reason: SDUPTHER

## 2021-06-16 RX ORDER — HYDROXYZINE HYDROCHLORIDE 25 MG/1
25 TABLET, FILM COATED ORAL 4 TIMES DAILY PRN
Qty: 90 TABLET | Refills: 0 | Status: SHIPPED | OUTPATIENT
Start: 2021-06-16 | End: 2021-08-03

## 2021-07-26 ENCOUNTER — TELEPHONE (OUTPATIENT)
Dept: PRIMARY CARE CLINIC | Facility: CLINIC | Age: 41
End: 2021-07-26

## 2021-07-29 ENCOUNTER — TELEPHONE (OUTPATIENT)
Dept: PRIMARY CARE CLINIC | Facility: CLINIC | Age: 41
End: 2021-07-29

## 2021-07-30 ENCOUNTER — PATIENT MESSAGE (OUTPATIENT)
Dept: PRIMARY CARE CLINIC | Facility: CLINIC | Age: 41
End: 2021-07-30

## 2021-08-02 ENCOUNTER — PATIENT MESSAGE (OUTPATIENT)
Dept: PRIMARY CARE CLINIC | Facility: CLINIC | Age: 41
End: 2021-08-02

## 2021-08-03 ENCOUNTER — OFFICE VISIT (OUTPATIENT)
Dept: PRIMARY CARE CLINIC | Facility: CLINIC | Age: 41
End: 2021-08-03
Payer: MEDICAID

## 2021-08-03 VITALS
SYSTOLIC BLOOD PRESSURE: 106 MMHG | DIASTOLIC BLOOD PRESSURE: 70 MMHG | OXYGEN SATURATION: 98 % | TEMPERATURE: 97 F | HEART RATE: 86 BPM

## 2021-08-03 DIAGNOSIS — F31.9 BIPOLAR AFFECTIVE DISORDER, REMISSION STATUS UNSPECIFIED: Primary | ICD-10-CM

## 2021-08-03 DIAGNOSIS — G35 MULTIPLE SCLEROSIS: ICD-10-CM

## 2021-08-03 DIAGNOSIS — K85.20 ALCOHOL-INDUCED ACUTE PANCREATITIS WITHOUT INFECTION OR NECROSIS: ICD-10-CM

## 2021-08-03 DIAGNOSIS — S82.62XS: ICD-10-CM

## 2021-08-03 PROBLEM — E87.6 HYPOKALEMIA: Status: ACTIVE | Noted: 2021-08-03

## 2021-08-03 PROBLEM — K85.90 ACUTE PANCREATITIS: Status: ACTIVE | Noted: 2021-08-03

## 2021-08-03 PROCEDURE — 99215 OFFICE O/P EST HI 40 MIN: CPT | Mod: S$GLB,,, | Performed by: INTERNAL MEDICINE

## 2021-08-03 PROCEDURE — 99215 PR OFFICE/OUTPT VISIT, EST, LEVL V, 40-54 MIN: ICD-10-PCS | Mod: S$GLB,,, | Performed by: INTERNAL MEDICINE

## 2021-08-03 RX ORDER — HYDROCODONE BITARTRATE AND ACETAMINOPHEN 7.5; 325 MG/1; MG/1
1 TABLET ORAL EVERY 12 HOURS PRN
Qty: 45 TABLET | Refills: 0 | Status: SHIPPED | OUTPATIENT
Start: 2021-08-03 | End: 2022-03-23

## 2021-08-12 ENCOUNTER — TELEPHONE (OUTPATIENT)
Dept: PRIMARY CARE CLINIC | Facility: CLINIC | Age: 41
End: 2021-08-12

## 2021-08-30 ENCOUNTER — OFFICE VISIT (OUTPATIENT)
Dept: ORTHOPEDICS | Facility: CLINIC | Age: 41
End: 2021-08-30
Payer: MEDICAID

## 2021-08-30 VITALS — TEMPERATURE: 98 F

## 2021-08-30 DIAGNOSIS — S82.392A CLOSED FRACTURE OF POSTERIOR MALLEOLUS OF LEFT TIBIA, INITIAL ENCOUNTER: ICD-10-CM

## 2021-08-30 PROCEDURE — 99203 PR OFFICE/OUTPT VISIT, NEW, LEVL III, 30-44 MIN: ICD-10-PCS | Mod: S$GLB,,, | Performed by: ORTHOPAEDIC SURGERY

## 2021-08-30 PROCEDURE — 99203 OFFICE O/P NEW LOW 30 MIN: CPT | Mod: S$GLB,,, | Performed by: ORTHOPAEDIC SURGERY

## 2021-12-14 ENCOUNTER — IMMUNIZATION (OUTPATIENT)
Dept: HEMATOLOGY/ONCOLOGY | Facility: CLINIC | Age: 41
End: 2021-12-14
Payer: MEDICAID

## 2021-12-14 DIAGNOSIS — Z23 NEED FOR VACCINATION: Primary | ICD-10-CM

## 2021-12-14 PROCEDURE — 91300 COVID-19, MRNA, LNP-S, PF, 30 MCG/0.3 ML DOSE VACCINE: CPT | Mod: S$GLB,,, | Performed by: FAMILY MEDICINE

## 2021-12-14 PROCEDURE — 0003A COVID-19, MRNA, LNP-S, PF, 30 MCG/0.3 ML DOSE VACCINE: CPT | Mod: S$GLB,,, | Performed by: FAMILY MEDICINE

## 2021-12-14 PROCEDURE — 0004A COVID-19, MRNA, LNP-S, PF, 30 MCG/0.3 ML DOSE VACCINE: ICD-10-PCS | Mod: S$GLB,,, | Performed by: FAMILY MEDICINE

## 2021-12-14 PROCEDURE — 91300 COVID-19, MRNA, LNP-S, PF, 30 MCG/0.3 ML DOSE VACCINE: ICD-10-PCS | Mod: S$GLB,,, | Performed by: FAMILY MEDICINE

## 2021-12-14 PROCEDURE — 0004A COVID-19, MRNA, LNP-S, PF, 30 MCG/0.3 ML DOSE VACCINE: CPT | Mod: S$GLB,,, | Performed by: FAMILY MEDICINE

## 2021-12-14 PROCEDURE — 0003A COVID-19, MRNA, LNP-S, PF, 30 MCG/0.3 ML DOSE VACCINE: ICD-10-PCS | Mod: S$GLB,,, | Performed by: FAMILY MEDICINE

## 2021-12-21 ENCOUNTER — PATIENT MESSAGE (OUTPATIENT)
Dept: NEUROLOGY | Facility: CLINIC | Age: 41
End: 2021-12-21
Payer: MEDICAID

## 2022-02-28 ENCOUNTER — PATIENT MESSAGE (OUTPATIENT)
Dept: PSYCHIATRY | Facility: CLINIC | Age: 42
End: 2022-02-28
Payer: MEDICAID

## 2022-03-23 ENCOUNTER — OFFICE VISIT (OUTPATIENT)
Dept: PRIMARY CARE CLINIC | Facility: CLINIC | Age: 42
End: 2022-03-23
Payer: MEDICAID

## 2022-03-23 VITALS
TEMPERATURE: 98 F | DIASTOLIC BLOOD PRESSURE: 85 MMHG | WEIGHT: 158 LBS | RESPIRATION RATE: 16 BRPM | HEIGHT: 61 IN | HEART RATE: 52 BPM | OXYGEN SATURATION: 95 % | SYSTOLIC BLOOD PRESSURE: 129 MMHG | BODY MASS INDEX: 29.83 KG/M2

## 2022-03-23 DIAGNOSIS — R09.81 NASAL CONGESTION: ICD-10-CM

## 2022-03-23 DIAGNOSIS — I10 ESSENTIAL HYPERTENSION: Primary | ICD-10-CM

## 2022-03-23 DIAGNOSIS — Z12.4 PAP SMEAR FOR CERVICAL CANCER SCREENING: ICD-10-CM

## 2022-03-23 DIAGNOSIS — Z12.31 BREAST CANCER SCREENING BY MAMMOGRAM: ICD-10-CM

## 2022-03-23 PROCEDURE — 1160F PR REVIEW ALL MEDS BY PRESCRIBER/CLIN PHARMACIST DOCUMENTED: ICD-10-PCS | Mod: CPTII,S$GLB,, | Performed by: INTERNAL MEDICINE

## 2022-03-23 PROCEDURE — 1159F MED LIST DOCD IN RCRD: CPT | Mod: CPTII,S$GLB,, | Performed by: INTERNAL MEDICINE

## 2022-03-23 PROCEDURE — 3079F DIAST BP 80-89 MM HG: CPT | Mod: CPTII,S$GLB,, | Performed by: INTERNAL MEDICINE

## 2022-03-23 PROCEDURE — 99214 OFFICE O/P EST MOD 30 MIN: CPT | Mod: S$GLB,,, | Performed by: INTERNAL MEDICINE

## 2022-03-23 PROCEDURE — 3008F BODY MASS INDEX DOCD: CPT | Mod: CPTII,S$GLB,, | Performed by: INTERNAL MEDICINE

## 2022-03-23 PROCEDURE — 1160F RVW MEDS BY RX/DR IN RCRD: CPT | Mod: CPTII,S$GLB,, | Performed by: INTERNAL MEDICINE

## 2022-03-23 PROCEDURE — 3079F PR MOST RECENT DIASTOLIC BLOOD PRESSURE 80-89 MM HG: ICD-10-PCS | Mod: CPTII,S$GLB,, | Performed by: INTERNAL MEDICINE

## 2022-03-23 PROCEDURE — 3074F PR MOST RECENT SYSTOLIC BLOOD PRESSURE < 130 MM HG: ICD-10-PCS | Mod: CPTII,S$GLB,, | Performed by: INTERNAL MEDICINE

## 2022-03-23 PROCEDURE — 3008F PR BODY MASS INDEX (BMI) DOCUMENTED: ICD-10-PCS | Mod: CPTII,S$GLB,, | Performed by: INTERNAL MEDICINE

## 2022-03-23 PROCEDURE — 3074F SYST BP LT 130 MM HG: CPT | Mod: CPTII,S$GLB,, | Performed by: INTERNAL MEDICINE

## 2022-03-23 PROCEDURE — 1159F PR MEDICATION LIST DOCUMENTED IN MEDICAL RECORD: ICD-10-PCS | Mod: CPTII,S$GLB,, | Performed by: INTERNAL MEDICINE

## 2022-03-23 PROCEDURE — 99214 PR OFFICE/OUTPT VISIT, EST, LEVL IV, 30-39 MIN: ICD-10-PCS | Mod: S$GLB,,, | Performed by: INTERNAL MEDICINE

## 2022-03-23 RX ORDER — CLONIDINE HYDROCHLORIDE 0.1 MG/1
TABLET ORAL
Status: ON HOLD | COMMUNITY
Start: 2022-03-04 | End: 2023-04-09 | Stop reason: HOSPADM

## 2022-03-23 RX ORDER — IBUPROFEN 800 MG/1
800 TABLET ORAL 3 TIMES DAILY
COMMUNITY
End: 2023-04-07 | Stop reason: CLARIF

## 2022-03-23 RX ORDER — BUPRENORPHINE AND NALOXONE 8; 2 MG/1; MG/1
1 FILM, SOLUBLE BUCCAL; SUBLINGUAL 2 TIMES DAILY
COMMUNITY

## 2022-03-23 RX ORDER — DULOXETIN HYDROCHLORIDE 30 MG/1
1 CAPSULE, DELAYED RELEASE ORAL EVERY MORNING
COMMUNITY
Start: 2022-03-07 | End: 2022-11-07

## 2022-03-23 RX ORDER — FLUTICASONE PROPIONATE 50 MCG
1 SPRAY, SUSPENSION (ML) NASAL DAILY
Qty: 16 G | Refills: 2 | Status: SHIPPED | OUTPATIENT
Start: 2022-03-23 | End: 2023-04-07 | Stop reason: CLARIF

## 2022-03-23 NOTE — PROGRESS NOTES
Subjective:      Patient ID: Janis Reese is a 41 y.o. female.    Chief Complaint: Sinus Problem (Pt c/o at night when lay down nose becomes very congested and stuffy), Referral (Pt stated she need a mammogram and referral for OT/PT), and Eye Problem (Pt c/o bilat eye buldging (asking if it could be thyroid related))     Patient with history of multiple sclerosis diagnosed at age of 22.  Patient has bilateral leg weakness and diffuse muscle pains. Muscle pains are controlled with Suboxone. Patient reports the pain in th legs is intermittent, sharp squeezing in nature, the muscles are stiff and painful. Patient has tried Tizanidine and Baclofen with little help. Patient becomes incontinent with Muscle relaxes.  Patient is being followed by neurologist in [place] Dr. Acevedo. Patient is now taking Tizanidine as needed with little help.     Patient has history of hypertension.  Patient was on verapamil with much better control of blood pressure and heart rate.  The medication was stopped by psychiatrist and started on clonidine.  Patient blood pressure slightly on higher side and heart rate is quite low.  Patient denies dizziness, lightheadedness    Patient has history of bipolar disorder and is taking Lamictal and Cymbalta.  Patient reports symptoms are under good control.  Patient denies any crying spell, lack of energy, lack of interest, so feeling of guilt or worthlessness, no suicidal homicidal ideation.  Patient is taking Seroquel 200 mg (half of 400 mg at night) the medication is not scored and advised to discuss with psychiatrist to prescribe lower dose.    Patient also complained of nasal congestion, denies postnasal drip, runny nose, ear pain, sore throat, cough, wheezing or shortness of breath.  Patient has use Flonase before with much help    Patient also reports the family has noticed that eyes are becoming more prominent last few months.  Patient denies dry skin, hair loss, palpitation, weight  loss, constipation, diarrhea.       Review of Systems   Constitutional: Positive for malaise/fatigue. Negative for chills, diaphoresis, fever and weight loss.   HENT: Positive for congestion. Negative for ear pain, sinus pain, sore throat and tinnitus.    Eyes: Negative for blurred vision and photophobia.   Respiratory: Negative for cough, hemoptysis, shortness of breath and wheezing.    Cardiovascular: Negative for chest pain, palpitations, orthopnea, leg swelling and PND.   Gastrointestinal: Negative for abdominal pain, blood in stool, constipation, diarrhea, heartburn, melena, nausea and vomiting.   Genitourinary: Negative for dysuria, frequency and urgency.   Musculoskeletal: Positive for back pain and joint pain (left ankle pain ). Negative for myalgias and neck pain.   Skin: Negative for rash.   Neurological: Negative for dizziness, tremors, seizures, loss of consciousness and weakness.   Endo/Heme/Allergies: Negative for polydipsia.   Psychiatric/Behavioral: Positive for depression (imrpoved). Negative for hallucinations. The patient does not have insomnia.      Objective:     Physical Exam  Constitutional:       General: She is not in acute distress.     Appearance: She is not diaphoretic.   Neck:      Thyroid: No thyromegaly.   Cardiovascular:      Rate and Rhythm: Normal rate and regular rhythm.      Heart sounds: Normal heart sounds. No murmur heard.  Pulmonary:      Effort: Pulmonary effort is normal. No respiratory distress.      Breath sounds: Normal breath sounds. No wheezing.   Chest:      Chest wall: No tenderness.   Abdominal:      General: Bowel sounds are normal. There is no distension.      Palpations: Abdomen is soft.      Tenderness: There is no abdominal tenderness.   Musculoskeletal:         General: No tenderness.      Comments: Wheelchair-bound with bilateral leg weakness.   Lymphadenopathy:      Cervical: No cervical adenopathy.   Neurological:      Mental Status: She is alert and  oriented to person, place, and time.      Cranial Nerves: No cranial nerve deficit.      Sensory: No sensory deficit.   Psychiatric:         Behavior: Behavior normal.         Thought Content: Thought content normal.         Judgment: Judgment normal.       Assessment:       ICD-10-CM ICD-9-CM   1. Essential hypertension  I10 401.9   2. Breast cancer screening by mammogram  Z12.31 V76.12   3. Pap smear for cervical cancer screening  Z12.4 V76.2   4. Nasal congestion  R09.81 478.19       Plan:     Patient with a history of bipolar disorder and is being followed by psychiatry Dr. Reese  Patient is on multiple sedative medication advised patient about possible side effect of the medicine  Patient is also followed by Suboxone clinic.   Patient is given clonidine for blood pressure control and for agitation  Medication is causing bradycardia with heart rate going down to 50.   Advised patient about possible side effect of the medication and advised to minimize the use of clonidine.  Patient plans to discuss it with prescriber.  Will order a mammogram + referred to gyn for Pap smear  The patient complains of nasal congestion.. We use Flonase  Patient has bilateral leg weakness patient requested referral to physical therapy/occupational therapy  I am not aware of any facility that is taking medication.  Advised patient to call her insurance and let us know facility that is in network  I will refer patient to that facility  Patient reports prominence of eyes though examination is unremarkable, no lid lag  Will check TSH and T4    Medication List with Changes/Refills   New Medications    FLUTICASONE PROPIONATE (FLONASE) 50 MCG/ACTUATION NASAL SPRAY    1 spray (50 mcg total) by Each Nostril route once daily.   Current Medications    ALBUTEROL (PROVENTIL/VENTOLIN HFA) 90 MCG/ACTUATION INHALER    INHALE 2 PUFFS EVERY 6 HOURS AS NEEDED FOR WHEEZING    BUPRENORPHINE-NALOXONE 8-2 MG (SUBOXONE) 8-2 MG    Place 1 each under the  tongue 2 (two) times a day.    CLONIDINE (CATAPRES) 0.1 MG TABLET    Take one tablet by mouth three times a day as needed for withdraw,  anxiety, insomnia. Wait at least 8 hours between doses    DULOXETINE (CYMBALTA) 30 MG CAPSULE    Take 1 capsule by mouth every morning.    ERGOCALCIFEROL (ERGOCALCIFEROL) 50,000 UNIT CAP    Take 2 capsules by mouth every 7 days.    HEPARIN, PORCINE, PF, (HEPARIN FLUSH 100 UNITS/ML) 100 UNIT/ML SYRG        IBUPROFEN (ADVIL,MOTRIN) 800 MG TABLET    Take 800 mg by mouth 3 (three) times daily.    KESIMPTA PEN 20 MG/0.4 ML PNIJ        LAMOTRIGINE (LAMICTAL) 100 MG TABLET    Take 100 mg by mouth once daily.     NORMAL SALINE FLUSH INJECTION        OMEPRAZOLE (PRILOSEC) 40 MG CAPSULE    TAKE 1 CAPSULE(40 MG) BY MOUTH EVERY DAY    ONDANSETRON (ZOFRAN-ODT) 4 MG TBDL    Take 4 mg by mouth every 6 (six) hours as needed.     QUETIAPINE (SEROQUEL) 400 MG TABLET    Take 800 mg by mouth every evening.     SUMATRIPTAN (IMITREX) 100 MG TABLET    Take 100 mg by mouth every 2 (two) hours as needed.     TIZANIDINE (ZANAFLEX) 2 MG TABLET    Take 2 mg by mouth every 6 (six) hours as needed. Take 3 tablets 3 times daily   Discontinued Medications    HYDROCODONE-ACETAMINOPHEN (NORCO) 7.5-325 MG PER TABLET    Take 1 tablet by mouth every 12 (twelve) hours as needed for Pain.    SERTRALINE (ZOLOFT) 100 MG TABLET    Take 200 mg by mouth once daily.     VERAPAMIL (VERELAN) 240 MG C24P    Take 1 capsule (240 mg total) by mouth once daily.

## 2022-05-05 LAB
ABS NRBC COUNT: 0 X 10 3/UL (ref 0–0.01)
ABSOLUTE BASOPHIL: 0.02 X 10 3/UL (ref 0–0.22)
ABSOLUTE EOSINOPHIL: 0.07 X 10 3/UL (ref 0.04–0.54)
ABSOLUTE IMMATURE GRAN: 0.02 X 10 3/UL (ref 0–0.04)
ABSOLUTE LYMPHOCYTE: 1.39 X 10 3/UL (ref 0.86–4.75)
ABSOLUTE MONOCYTE: 0.47 X 10 3/UL (ref 0.22–1.08)
ALBUMIN SERPL-MCNC: 4.5 G/DL (ref 3.5–5.2)
ALBUMIN/GLOB SERPL ELPH: 1.6 {RATIO} (ref 1–2.7)
ALP ISOS SERPL LEV INH-CCNC: 100 U/L (ref 35–105)
ALT (SGPT): 34 U/L (ref 0–33)
ANION GAP SERPL CALC-SCNC: 12 MMOL/L (ref 8–17)
AST SERPL-CCNC: 23 U/L (ref 0–32)
BASOPHILS NFR BLD: 0.3 % (ref 0.2–1.2)
BILIRUBIN, TOTAL: 0.39 MG/DL (ref 0–1.2)
BUN/CREAT SERPL: 11.9 (ref 6–20)
CALCIUM SERPL-MCNC: 9.9 MG/DL (ref 8.6–10.2)
CARBON DIOXIDE, CO2: 25 MMOL/L (ref 22–29)
CHLORIDE: 101 MMOL/L (ref 98–107)
CHOLEST SERPL-MSCNC: 219 MG/DL (ref 100–200)
CREAT SERPL-MCNC: 0.7 MG/DL (ref 0.5–0.9)
EOSINOPHIL NFR BLD: 0.9 % (ref 0.7–7)
GFR ESTIMATION: 92.68
GLOBULIN: 2.8 G/DL (ref 1.5–4.5)
GLUCOSE: 140 MG/DL (ref 74–106)
HCT VFR BLD AUTO: 39.1 % (ref 37–47)
HDLC SERPL-MCNC: 55 MG/DL
HGB BLD-MCNC: 13.4 G/DL (ref 12–16)
IMMATURE GRANULOCYTES: 0.3 % (ref 0–0.5)
LDL/HDL RATIO: 2.1 (ref 1–3)
LDLC SERPL CALC-MCNC: 118 MG/DL (ref 0–100)
LYMPHOCYTES NFR BLD: 18.7 % (ref 19.3–53.1)
MCH RBC QN AUTO: 29.4 PG (ref 27–32)
MCHC RBC AUTO-ENTMCNC: 34.3 G/DL (ref 32–36)
MCV RBC AUTO: 85.7 FL (ref 82–100)
MONOCYTES NFR BLD: 6.3 % (ref 4.7–12.5)
NEUTROPHILS # BLD AUTO: 5.45 X 10 3/UL (ref 2.15–7.56)
NEUTROPHILS NFR BLD: 73.5 % (ref 34–71.1)
NUCLEATED RED BLOOD CELLS: 0 /100 WBC (ref 0–0.2)
PLATELET # BLD AUTO: 285 X 10 3/UL (ref 135–400)
POTASSIUM: 3.8 MMOL/L (ref 3.5–5.1)
PROT SNV-MCNC: 7.3 G/DL (ref 6.4–8.3)
RBC # BLD AUTO: 4.56 X 10 6/UL (ref 4.2–5.4)
RDW-SD: 38.5 FL (ref 37–54)
SODIUM: 138 MMOL/L (ref 136–145)
T4, FREE: 0.75 NG/DL (ref 0.93–1.7)
TRIGL SERPL-MCNC: 230 MG/DL (ref 0–150)
TSH SERPL DL<=0.005 MIU/L-ACNC: 1.51 UIU/ML (ref 0.27–4.2)
UREA NITROGEN (BUN): 8.3 MG/DL (ref 6–20)
WBC # BLD: 7.42 X 10 3/UL (ref 4.3–10.8)

## 2022-05-06 DIAGNOSIS — R73.01 IMPAIRED FASTING GLUCOSE: Primary | ICD-10-CM

## 2022-05-06 DIAGNOSIS — E03.9 HYPOTHYROIDISM, UNSPECIFIED TYPE: ICD-10-CM

## 2022-05-06 RX ORDER — LEVOTHYROXINE SODIUM 25 UG/1
25 TABLET ORAL
Qty: 30 TABLET | Refills: 11 | Status: SHIPPED | OUTPATIENT
Start: 2022-05-06 | End: 2022-07-08 | Stop reason: SDUPTHER

## 2022-05-27 RX ORDER — VERAPAMIL HYDROCHLORIDE 120 MG/1
120 TABLET, FILM COATED, EXTENDED RELEASE ORAL EVERY MORNING
COMMUNITY
Start: 2022-05-06 | End: 2023-03-28

## 2022-06-15 ENCOUNTER — OFFICE VISIT (OUTPATIENT)
Dept: PRIMARY CARE CLINIC | Facility: CLINIC | Age: 42
End: 2022-06-15
Payer: MEDICAID

## 2022-06-15 VITALS
HEART RATE: 71 BPM | OXYGEN SATURATION: 97 % | DIASTOLIC BLOOD PRESSURE: 81 MMHG | SYSTOLIC BLOOD PRESSURE: 125 MMHG | RESPIRATION RATE: 16 BRPM | HEIGHT: 61 IN | WEIGHT: 155 LBS | BODY MASS INDEX: 29.27 KG/M2 | TEMPERATURE: 97 F

## 2022-06-15 DIAGNOSIS — E03.9 HYPOTHYROIDISM, UNSPECIFIED TYPE: Primary | ICD-10-CM

## 2022-06-15 DIAGNOSIS — I10 ESSENTIAL HYPERTENSION: ICD-10-CM

## 2022-06-15 DIAGNOSIS — R73.01 IMPAIRED FASTING GLUCOSE: ICD-10-CM

## 2022-06-15 PROCEDURE — 3074F SYST BP LT 130 MM HG: CPT | Mod: CPTII,S$GLB,, | Performed by: INTERNAL MEDICINE

## 2022-06-15 PROCEDURE — 3079F DIAST BP 80-89 MM HG: CPT | Mod: CPTII,S$GLB,, | Performed by: INTERNAL MEDICINE

## 2022-06-15 PROCEDURE — 3074F PR MOST RECENT SYSTOLIC BLOOD PRESSURE < 130 MM HG: ICD-10-PCS | Mod: CPTII,S$GLB,, | Performed by: INTERNAL MEDICINE

## 2022-06-15 PROCEDURE — 3008F BODY MASS INDEX DOCD: CPT | Mod: CPTII,S$GLB,, | Performed by: INTERNAL MEDICINE

## 2022-06-15 PROCEDURE — 3079F PR MOST RECENT DIASTOLIC BLOOD PRESSURE 80-89 MM HG: ICD-10-PCS | Mod: CPTII,S$GLB,, | Performed by: INTERNAL MEDICINE

## 2022-06-15 PROCEDURE — 1159F PR MEDICATION LIST DOCUMENTED IN MEDICAL RECORD: ICD-10-PCS | Mod: CPTII,S$GLB,, | Performed by: INTERNAL MEDICINE

## 2022-06-15 PROCEDURE — 1160F PR REVIEW ALL MEDS BY PRESCRIBER/CLIN PHARMACIST DOCUMENTED: ICD-10-PCS | Mod: CPTII,S$GLB,, | Performed by: INTERNAL MEDICINE

## 2022-06-15 PROCEDURE — 99214 OFFICE O/P EST MOD 30 MIN: CPT | Mod: S$GLB,,, | Performed by: INTERNAL MEDICINE

## 2022-06-15 PROCEDURE — 99214 PR OFFICE/OUTPT VISIT, EST, LEVL IV, 30-39 MIN: ICD-10-PCS | Mod: S$GLB,,, | Performed by: INTERNAL MEDICINE

## 2022-06-15 PROCEDURE — 1160F RVW MEDS BY RX/DR IN RCRD: CPT | Mod: CPTII,S$GLB,, | Performed by: INTERNAL MEDICINE

## 2022-06-15 PROCEDURE — 3008F PR BODY MASS INDEX (BMI) DOCUMENTED: ICD-10-PCS | Mod: CPTII,S$GLB,, | Performed by: INTERNAL MEDICINE

## 2022-06-15 PROCEDURE — 1159F MED LIST DOCD IN RCRD: CPT | Mod: CPTII,S$GLB,, | Performed by: INTERNAL MEDICINE

## 2022-06-15 RX ORDER — NALOXONE HYDROCHLORIDE 4 MG/.1ML
SPRAY NASAL
COMMUNITY
Start: 2022-05-23 | End: 2023-04-07 | Stop reason: CLARIF

## 2022-06-15 NOTE — PROGRESS NOTES
Subjective:      Patient ID: Janis Reese is a 41 y.o. female.    Chief Complaint: Hypertension (3 month f/u )     Patient with history of multiple sclerosis diagnosed at age of 22.  Patient has bilateral leg weakness and diffuse muscle pains. Muscle pains are controlled with Suboxone. Patient reports the pain in th legs is intermittent, sharp squeezing in nature, the muscles are stiff and painful.  Patient is taking tizanidine as needed with some help Patient is being followed by neurologist in [place] Dr. Acevedo.     Patient has history of hypertension.  Patient was on verapamil with much better control of blood pressure and heart rate.  Patient is taking 1 clonidine at night as well without any problem for sleep as prescribed by psychiatrist    Patient has history of bipolar disorder and is taking Lamictal and Cymbalta.  Patient reports symptoms are under good control.  Patient denies any crying spell, lack of energy, lack of interest, so feeling of guilt or worthlessness, no suicidal homicidal ideation.  Patient is taking Seroquel 200 mg for insomnia.    Patient last labs suggested impaired fasting glucose with fasting glucose of 140. A1c is ordered but not yet done.  Patient TSH was normal as well but T4 was slightly low.  Patient denies any weight gain, dry skin, hair loss, constipation, fatigue. Patient is taking Levothyroxine 25mcg daily.     Review of Systems   Constitutional: Positive for malaise/fatigue and weight loss (4lbs weight loss). Negative for chills, diaphoresis and fever.   HENT: Negative for ear pain, sinus pain, sore throat and tinnitus.    Eyes: Negative for blurred vision and photophobia.   Respiratory: Negative for cough, hemoptysis, shortness of breath and wheezing.    Cardiovascular: Negative for chest pain, palpitations, orthopnea, leg swelling and PND.   Gastrointestinal: Negative for abdominal pain, blood in stool, constipation, diarrhea, heartburn, melena, nausea and  Psychoeducation Group Documentation    PATIENT'S NAME: Ara Mendiola  MRN:   [id number]  :   2009  ACCT. NUMBER: [id number]  DATE OF SERVICE: 3/23/22  START TIME:  9:30 AM  END TIME: 10:30 AM  FACILITATOR(S): Cuauhtemoc Johnston  TOPIC: Child/Adol Psych Education  Number of patients attending the group: 3  Group Length:  1 Hours    Summary of Group / Topics Discussed:    Effective Group Participation: Description and therapeutic purpose: The set of skills and ideas from Effective Group Participation will prepare group members to support a safe and respectful atmosphere for self expression and increase the group member s ability to comprehend presented therapeutic instruction and psychoeducation.          Group Attendance:  Attended group session    Patient's response to the group topic/interactions:  expressed understanding of topic    Patient appeared to be Actively participating.         Client specific details:  See note above.         vomiting.   Genitourinary: Negative for dysuria, frequency and urgency.   Musculoskeletal: Positive for back pain and joint pain (left ankle pain ). Negative for myalgias and neck pain.   Skin: Negative for rash.   Neurological: Negative for dizziness, tremors, seizures, loss of consciousness and weakness.   Endo/Heme/Allergies: Negative for polydipsia.   Psychiatric/Behavioral: Positive for depression (Controlled). Negative for hallucinations. The patient does not have insomnia.      Objective:     Physical Exam  Constitutional:       General: She is not in acute distress.     Appearance: She is not diaphoretic.   Neck:      Thyroid: No thyromegaly.   Cardiovascular:      Rate and Rhythm: Normal rate and regular rhythm.      Heart sounds: Normal heart sounds. No murmur heard.  Pulmonary:      Effort: Pulmonary effort is normal. No respiratory distress.      Breath sounds: Normal breath sounds. No wheezing.   Chest:      Chest wall: No tenderness.   Abdominal:      General: Bowel sounds are normal. There is no distension.      Palpations: Abdomen is soft.      Tenderness: There is no abdominal tenderness.   Musculoskeletal:         General: No tenderness.      Comments: Wheelchair-bound with bilateral leg weakness.   Lymphadenopathy:      Cervical: No cervical adenopathy.   Neurological:      Mental Status: She is alert and oriented to person, place, and time.      Cranial Nerves: No cranial nerve deficit.      Sensory: No sensory deficit.   Psychiatric:         Behavior: Behavior normal.         Thought Content: Thought content normal.         Judgment: Judgment normal.       Assessment:       ICD-10-CM ICD-9-CM   1. Hypothyroidism, unspecified type  E03.9 244.9   2. Impaired fasting glucose  R73.01 790.21   3. Essential hypertension  I10 401.9       Plan:     Patient with a history of bipolar disorder and is being followed by psychiatry Dr. Reese  Patient is on multiple sedative medication advised patient about  possible side effect of the medicine  Patient is also followed by Suboxone clinic.   Patient is given clonidine for blood pressure control and for agitation  Patient previously had bradycardia probably secondary to clonidine.  Advised patient about possible side effects  Patient last fasting glucose was 140 A1c is ordered but not yet done  Advised patient to get labs done  Patient is recently diagnosed to have hypothyroidism and is started on levothyroxine  Will check TSH  + T4      Medication List with Changes/Refills   Current Medications    ALBUTEROL (PROVENTIL/VENTOLIN HFA) 90 MCG/ACTUATION INHALER    INHALE 2 PUFFS EVERY 6 HOURS AS NEEDED FOR WHEEZING    BUPRENORPHINE-NALOXONE 8-2 MG (SUBOXONE) 8-2 MG    Place 1 each under the tongue 2 (two) times a day.    CLONIDINE (CATAPRES) 0.1 MG TABLET    Take one tablet by mouth three times a day as needed for withdraw,  anxiety, insomnia. Wait at least 8 hours between doses    DULOXETINE (CYMBALTA) 30 MG CAPSULE    Take 1 capsule by mouth every morning.    ERGOCALCIFEROL (ERGOCALCIFEROL) 50,000 UNIT CAP    Take 2 capsules by mouth every 7 days.    FLUTICASONE PROPIONATE (FLONASE) 50 MCG/ACTUATION NASAL SPRAY    1 spray (50 mcg total) by Each Nostril route once daily.    HEPARIN, PORCINE, PF, (HEPARIN FLUSH 100 UNITS/ML) 100 UNIT/ML SYRG        IBUPROFEN (ADVIL,MOTRIN) 800 MG TABLET    Take 800 mg by mouth 3 (three) times daily.    KESIMPTA PEN 20 MG/0.4 ML PNIJ        LAMOTRIGINE (LAMICTAL) 100 MG TABLET    Take 100 mg by mouth once daily.     LEVOTHYROXINE (SYNTHROID) 25 MCG TABLET    Take 1 tablet (25 mcg total) by mouth before breakfast.    NARCAN 4 MG/ACTUATION SPRY        NORMAL SALINE FLUSH INJECTION        OMEPRAZOLE (PRILOSEC) 40 MG CAPSULE    TAKE 1 CAPSULE(40 MG) BY MOUTH EVERY DAY    ONDANSETRON (ZOFRAN-ODT) 4 MG TBDL    Take 4 mg by mouth every 6 (six) hours as needed.     QUETIAPINE (SEROQUEL) 400 MG TABLET    Take 200 mg by mouth every evening.     SUMATRIPTAN (IMITREX) 100 MG TABLET    Take 100 mg by mouth every 2 (two) hours as needed.     TIZANIDINE (ZANAFLEX) 2 MG TABLET    Take 2 mg by mouth every 6 (six) hours as needed. Take 3 tablets 3 times daily    VERAPAMIL (CALAN-SR) 120 MG CR TABLET    Take 120 mg by mouth every morning.

## 2022-06-20 ENCOUNTER — TELEPHONE (OUTPATIENT)
Dept: PRIMARY CARE CLINIC | Facility: CLINIC | Age: 42
End: 2022-06-20
Payer: MEDICAID

## 2022-06-20 DIAGNOSIS — N39.0 URINARY TRACT INFECTION WITHOUT HEMATURIA, SITE UNSPECIFIED: Primary | ICD-10-CM

## 2022-06-20 RX ORDER — NITROFURANTOIN 25; 75 MG/1; MG/1
100 CAPSULE ORAL 2 TIMES DAILY
Qty: 14 CAPSULE | Refills: 0 | Status: SHIPPED | OUTPATIENT
Start: 2022-06-20 | End: 2022-11-07

## 2022-06-20 NOTE — TELEPHONE ENCOUNTER
----- Message from Martha Chaves sent at 6/20/2022 11:06 AM CDT -----  Contact: self  Need to have something called out for UTI to [id number] - [place], [place] - [address]  [address]  [place] [address]  Phone: [phone] Fax: [phone]     Her call back number is [phone]

## 2022-06-20 NOTE — TELEPHONE ENCOUNTER
----- Message from Martha Chaves sent at 6/20/2022 11:06 AM CDT -----  Contact: self  Need to have something called out for UTI to [id number] - [place], [place] - [address]  [address]  [place] [address]  Phone: [phone] Fax: [phone]     Her call back number is [phone]

## 2022-06-20 NOTE — TELEPHONE ENCOUNTER
Returned call to quita and she c/o frequent urination w/ urgency, burning, strong odor, states she forgot to mention this at her appt on 06/15/22. Please advise.

## 2022-06-24 ENCOUNTER — PATIENT MESSAGE (OUTPATIENT)
Dept: PSYCHIATRY | Facility: CLINIC | Age: 42
End: 2022-06-24
Payer: MEDICAID

## 2022-06-28 ENCOUNTER — TELEPHONE (OUTPATIENT)
Dept: PRIMARY CARE CLINIC | Facility: CLINIC | Age: 42
End: 2022-06-28
Payer: MEDICAID

## 2022-06-28 NOTE — TELEPHONE ENCOUNTER
----- Message from Jackeline Carl LPN sent at 6/28/2022  4:00 PM CDT -----  Contact: self    ----- Message -----  From: Martha Chaves  Sent: 6/28/2022   1:44 PM CDT  To: Asia Horta Staff    Requesting a call back regarding checking on the status of her blood test results. Please call back at [phone]

## 2022-06-28 NOTE — TELEPHONE ENCOUNTER
Returned call to patient regarding lab work. Patient states she had labs done at [facility], requested them and she is faxing them now.

## 2022-06-29 ENCOUNTER — TELEPHONE (OUTPATIENT)
Dept: PRIMARY CARE CLINIC | Facility: CLINIC | Age: 42
End: 2022-06-29
Payer: MEDICAID

## 2022-06-29 NOTE — TELEPHONE ENCOUNTER
----- Message from Linda Arzola sent at 6/29/2022  2:43 PM CDT -----  Type:  Mammogram    Caller is requesting to schedule their annual mammogram appointment.  Order is not listed in EPIC.  Please enter order and contact patient to schedule.  Name of Caller:Janis Reese    Where would they like the mammogram performed? [facility]   Would the patient rather a call back or a response via [facility]?    Best Call Back Number:[phone]    Additional Information:  needs mammo orders sent to the [facility] [phone]

## 2022-07-01 ENCOUNTER — TELEPHONE (OUTPATIENT)
Dept: PRIMARY CARE CLINIC | Facility: CLINIC | Age: 42
End: 2022-07-01
Payer: MEDICAID

## 2022-07-01 NOTE — TELEPHONE ENCOUNTER
----- Message from Jackeline Carl LPN sent at 7/1/2022  4:58 PM CDT -----  Contact: PT    ----- Message -----  From: Katlyn Ly  Sent: 7/1/2022   2:10 PM CDT  To: Asia Horta Staff    .Type:  Test Results    Who Called:  Janis   Name of Test (Lab/Mammo/Etc):  lab   Date of Test:  06/23/2022   Ordering Provider:  Asia   Where the test was performed:  [facility]   Would the patient rather a call back or a response via [facility]?  Callback   Best Call Back Number:  [phone] (home)     Additional Information:

## 2022-07-01 NOTE — TELEPHONE ENCOUNTER
Phoned patient and gave results of labs, patient states she is taking Levothyroxine as prescribed. Per MD will increase to 50mcg.

## 2022-07-08 DIAGNOSIS — E03.9 HYPOTHYROIDISM, UNSPECIFIED TYPE: ICD-10-CM

## 2022-07-08 NOTE — TELEPHONE ENCOUNTER
----- Message from Flower Lind sent at 7/8/2022  1:21 PM CDT -----  Regarding: thyroid medication   Patient states that Dr. Betancourt was going to increase her Levothyroxine to 50mcg but medication has not been sent to her pharmacy yet.

## 2022-07-11 RX ORDER — LEVOTHYROXINE SODIUM 25 UG/1
50 TABLET ORAL
Qty: 60 TABLET | Refills: 11 | Status: SHIPPED | OUTPATIENT
Start: 2022-07-11 | End: 2023-05-15 | Stop reason: DRUGHIGH

## 2022-07-13 ENCOUNTER — TELEPHONE (OUTPATIENT)
Dept: PRIMARY CARE CLINIC | Facility: CLINIC | Age: 42
End: 2022-07-13
Payer: MEDICAID

## 2022-07-13 NOTE — TELEPHONE ENCOUNTER
Returned call to Juanito rogers/ [facility] and left detailed vm regarding orders for patient. Advised once MD reviews will send them back.

## 2022-07-13 NOTE — TELEPHONE ENCOUNTER
----- Message from Jackeline Carl LPN sent at 7/7/2022  4:13 PM CDT -----  Contact: Juanito rogers/[employer]    ----- Message -----  From: Shelly Chacon  Sent: 7/7/2022   1:18 PM CDT  To: Asia Horta Staff    Juanito with [facility] [facility] calling wants to know if lab order were rcvd and plan order that needs to be signed by doctor.   He can be reached [phone]     Thanks,

## 2022-07-14 NOTE — TELEPHONE ENCOUNTER
----- Message from Liz Pettit sent at 7/14/2022  9:56 AM CDT -----  Regarding: Plan of Care  Contact: Juanito Restrepo, [facility]  Per phone call with Juanito, he wanted to know if the lab result was received and the plan of care that needs to be faxed back to their office.  The fax's number is [phone].    Thanks,  SJ

## 2022-07-14 NOTE — TELEPHONE ENCOUNTER
Tried to reach Juanito and era was left stating Dr. Betancourt is working on the form and labs were received.

## 2022-07-21 ENCOUNTER — TELEPHONE (OUTPATIENT)
Dept: PRIMARY CARE CLINIC | Facility: CLINIC | Age: 42
End: 2022-07-21
Payer: MEDICAID

## 2022-07-21 DIAGNOSIS — M79.10 MUSCLE PAIN: Primary | ICD-10-CM

## 2022-07-21 NOTE — TELEPHONE ENCOUNTER
----- Message from Shelly Chacon sent at 7/21/2022  1:13 PM CDT -----  Contact: self  Pt calling about script for tiZANidine (ZANAFLEX) 2 MG and pt would like to increase to dose to 4 times day.  Pt stated she is out and she can be reached at [phone]       [facility] - [address] [address]  Phone: [phone] Fax: [phone]    Thanks,

## 2022-07-21 NOTE — TELEPHONE ENCOUNTER
"She would like to increase the Zanaflex. Said about 1 week ago she noticed her legs were still shaking "really bad at night.I started taking two in the morning and two at night and it has helped tremendously."     She ran out of her medication last night due to her taking 4 a day instead of the 3 a day  "

## 2022-07-22 RX ORDER — TIZANIDINE 4 MG/1
4 TABLET ORAL 2 TIMES DAILY PRN
Qty: 60 TABLET | Refills: 0 | Status: SHIPPED | OUTPATIENT
Start: 2022-07-22 | End: 2022-08-21

## 2022-07-29 ENCOUNTER — TELEPHONE (OUTPATIENT)
Dept: FAMILY MEDICINE | Facility: CLINIC | Age: 42
End: 2022-07-29
Payer: MEDICAID

## 2022-07-29 NOTE — TELEPHONE ENCOUNTER
----- Message from Art Wheeler sent at 7/29/2022  2:36 PM CDT -----  Contact: [facility]  .  Who Called:jose   Does the patient know what this is regarding?:plan of treatment sent office never received signed treatment back  Would the patient rather a call back or a response via [facility]? tye  Best Call Back Number:[phone]  Additional Information: sent over a month ago on 23rd

## 2022-08-19 ENCOUNTER — TELEPHONE (OUTPATIENT)
Dept: PRIMARY CARE CLINIC | Facility: CLINIC | Age: 42
End: 2022-08-19
Payer: MEDICAID

## 2022-08-22 ENCOUNTER — TELEPHONE (OUTPATIENT)
Dept: FAMILY MEDICINE | Facility: CLINIC | Age: 42
End: 2022-08-22
Payer: MEDICAID

## 2022-08-22 NOTE — TELEPHONE ENCOUNTER
----- Message from Art Wheeler sent at 8/22/2022 10:18 AM CDT -----  Contact: ot  Pt was given microbid and rxis not working. Pt visit hospital [facility] and did urine culture and did not receive results   [phone]  .  [facility] - [address] [address]  Phone: [phone] Fax: [phone]

## 2022-08-22 NOTE — TELEPHONE ENCOUNTER
----- Message from Linda Arzola sent at 8/22/2022  3:57 PM CDT -----  Type:  Needs Medical Advice    Who Called: Janis Reese    Symptoms (please be specific): -   How long has patient had these symptoms:  -  Pharmacy name and phone #:  -  Would the patient rather a call back or a response via [facility]?    Best Call Back Number: [phone]    Additional Information:  pt wants to know if you have received the culture results from [facility], please call

## 2022-08-30 ENCOUNTER — TELEPHONE (OUTPATIENT)
Dept: PRIMARY CARE CLINIC | Facility: CLINIC | Age: 42
End: 2022-08-30
Payer: MEDICAID

## 2022-08-30 NOTE — TELEPHONE ENCOUNTER
Patient went to [facility] ER on 08/15/22 and was treated for UTI w/ Keflex, patient's sister in law, Ms. Larkin states she tried calling our office for treatment after the keflex did not seem to be helping her. She did not hear back and states she increased patient's keflex to double the dose and ran out of medication. Patient still has symptoms, legs are very weak, stiff, muscles are rigid, urinary incontinence. Please advise. Record request faxed to [facility] Medical Records Dept. As urgent.

## 2022-08-31 NOTE — TELEPHONE ENCOUNTER
Returned call to patient and advised we do not have any opening w/ Dr. Betancourt or Dr. Nguyen, patient and spouse state patient is going to [facility] ER today for eval. Records from [facility] is still not received.

## 2022-08-31 NOTE — TELEPHONE ENCOUNTER
Will review [facility] urinalysis and culture reports.  Patient has been treated with nitrofurantoin 7-10 days ago + now is being treated with Keflex.  Patient needs to be evaluated before 3rd antibiotic is prescribed. (with multiple antibiotic use without any improvement in symptoms..  Makes me think if are diagnosis is right?)  I do not have an appointment today or tomorrow..  But if anybody cancels we can make an appointment  Please check with Dr. Nguyen if she will be able to see patient today or tomorrow.   If not patient needs to go to urgent care or ER.

## 2022-10-27 ENCOUNTER — TELEPHONE (OUTPATIENT)
Dept: PRIMARY CARE CLINIC | Facility: CLINIC | Age: 42
End: 2022-10-27
Payer: MEDICAID

## 2022-10-27 DIAGNOSIS — R25.2 MUSCLE CRAMPS: Primary | ICD-10-CM

## 2022-10-27 RX ORDER — TIZANIDINE 2 MG/1
2 TABLET ORAL EVERY 8 HOURS PRN
Qty: 90 TABLET | Refills: 0 | Status: SHIPPED | OUTPATIENT
Start: 2022-10-27 | End: 2022-11-07 | Stop reason: SDUPTHER

## 2022-10-27 NOTE — TELEPHONE ENCOUNTER
Pt states she tried to get in contact with her neurologist for a refill on her tizanidine and is not having any luck. Pt would like to know if Dr Betancourt can refill this medication for her                  ----- Message from Linda Arzola sent at 10/27/2022  3:14 PM CDT -----  Type:  Needs Medical Advice    Who Called: Janis Reese    Symptoms (please be specific): -   How long has patient had these symptoms:  -  Pharmacy name and phone #:  -  Would the patient rather a call back or a response via [facility]?    Best Call Back Number: [phone]    Additional Information: pt needs to speak w/ nurse about a medication

## 2022-11-07 ENCOUNTER — OFFICE VISIT (OUTPATIENT)
Dept: PRIMARY CARE CLINIC | Facility: CLINIC | Age: 42
End: 2022-11-07
Payer: MEDICAID

## 2022-11-07 VITALS
BODY MASS INDEX: 29.04 KG/M2 | HEART RATE: 70 BPM | HEIGHT: 61 IN | DIASTOLIC BLOOD PRESSURE: 84 MMHG | OXYGEN SATURATION: 98 % | RESPIRATION RATE: 16 BRPM | TEMPERATURE: 97 F | WEIGHT: 153.81 LBS | SYSTOLIC BLOOD PRESSURE: 122 MMHG

## 2022-11-07 DIAGNOSIS — I10 ESSENTIAL HYPERTENSION: ICD-10-CM

## 2022-11-07 DIAGNOSIS — R73.01 IMPAIRED FASTING GLUCOSE: ICD-10-CM

## 2022-11-07 DIAGNOSIS — G35 MULTIPLE SCLEROSIS: ICD-10-CM

## 2022-11-07 DIAGNOSIS — R25.2 MUSCLE CRAMPS: Primary | ICD-10-CM

## 2022-11-07 DIAGNOSIS — R11.0 NAUSEA: ICD-10-CM

## 2022-11-07 PROCEDURE — 90471 FLU VACCINE (QUAD) GREATER THAN OR EQUAL TO 3YO PRESERVATIVE FREE IM: ICD-10-PCS | Mod: S$GLB,,, | Performed by: INTERNAL MEDICINE

## 2022-11-07 PROCEDURE — 3079F DIAST BP 80-89 MM HG: CPT | Mod: CPTII,S$GLB,, | Performed by: INTERNAL MEDICINE

## 2022-11-07 PROCEDURE — 90686 FLU VACCINE (QUAD) GREATER THAN OR EQUAL TO 3YO PRESERVATIVE FREE IM: ICD-10-PCS | Mod: S$GLB,,, | Performed by: INTERNAL MEDICINE

## 2022-11-07 PROCEDURE — 3079F PR MOST RECENT DIASTOLIC BLOOD PRESSURE 80-89 MM HG: ICD-10-PCS | Mod: CPTII,S$GLB,, | Performed by: INTERNAL MEDICINE

## 2022-11-07 PROCEDURE — 1160F RVW MEDS BY RX/DR IN RCRD: CPT | Mod: CPTII,S$GLB,, | Performed by: INTERNAL MEDICINE

## 2022-11-07 PROCEDURE — 90686 IIV4 VACC NO PRSV 0.5 ML IM: CPT | Mod: S$GLB,,, | Performed by: INTERNAL MEDICINE

## 2022-11-07 PROCEDURE — 3008F BODY MASS INDEX DOCD: CPT | Mod: CPTII,S$GLB,, | Performed by: INTERNAL MEDICINE

## 2022-11-07 PROCEDURE — 3074F PR MOST RECENT SYSTOLIC BLOOD PRESSURE < 130 MM HG: ICD-10-PCS | Mod: CPTII,S$GLB,, | Performed by: INTERNAL MEDICINE

## 2022-11-07 PROCEDURE — 3074F SYST BP LT 130 MM HG: CPT | Mod: CPTII,S$GLB,, | Performed by: INTERNAL MEDICINE

## 2022-11-07 PROCEDURE — 1159F PR MEDICATION LIST DOCUMENTED IN MEDICAL RECORD: ICD-10-PCS | Mod: CPTII,S$GLB,, | Performed by: INTERNAL MEDICINE

## 2022-11-07 PROCEDURE — 90471 IMMUNIZATION ADMIN: CPT | Mod: S$GLB,,, | Performed by: INTERNAL MEDICINE

## 2022-11-07 PROCEDURE — 1159F MED LIST DOCD IN RCRD: CPT | Mod: CPTII,S$GLB,, | Performed by: INTERNAL MEDICINE

## 2022-11-07 PROCEDURE — 99214 PR OFFICE/OUTPT VISIT, EST, LEVL IV, 30-39 MIN: ICD-10-PCS | Mod: 25,S$GLB,, | Performed by: INTERNAL MEDICINE

## 2022-11-07 PROCEDURE — 1160F PR REVIEW ALL MEDS BY PRESCRIBER/CLIN PHARMACIST DOCUMENTED: ICD-10-PCS | Mod: CPTII,S$GLB,, | Performed by: INTERNAL MEDICINE

## 2022-11-07 PROCEDURE — 3008F PR BODY MASS INDEX (BMI) DOCUMENTED: ICD-10-PCS | Mod: CPTII,S$GLB,, | Performed by: INTERNAL MEDICINE

## 2022-11-07 PROCEDURE — 99214 OFFICE O/P EST MOD 30 MIN: CPT | Mod: 25,S$GLB,, | Performed by: INTERNAL MEDICINE

## 2022-11-07 RX ORDER — TIZANIDINE 2 MG/1
2 TABLET ORAL EVERY 8 HOURS PRN
Qty: 90 TABLET | Refills: 0 | Status: SHIPPED | OUTPATIENT
Start: 2022-11-07 | End: 2022-11-07

## 2022-11-07 RX ORDER — TIZANIDINE 4 MG/1
4 TABLET ORAL EVERY 8 HOURS
Qty: 90 TABLET | Refills: 0 | Status: SHIPPED | OUTPATIENT
Start: 2022-11-07 | End: 2022-12-21

## 2022-11-07 RX ORDER — ONDANSETRON 4 MG/1
4 TABLET, ORALLY DISINTEGRATING ORAL EVERY 6 HOURS PRN
Qty: 30 TABLET | Refills: 3 | Status: SHIPPED | OUTPATIENT
Start: 2022-11-07 | End: 2023-04-07 | Stop reason: CLARIF

## 2022-11-07 NOTE — PROGRESS NOTES
Subjective:      Patient ID: Janis Reese is a 42 y.o. female.    Chief Complaint: Mole (Pt c/o mole on right forearm and underneath the skin is a knot getting bigger.. noticed it  3 weeks ago but no pain) and Multiple Sclerosis (Pt c/o having a flare up and needing a new wheelchair)    Mole  Associated symptoms include myalgias. Pertinent negatives include no abdominal pain, chest pain, chills, congestion, coughing, diaphoresis, fever, nausea, neck pain, rash, sore throat, vomiting or weakness. : Patient with history of multiple sclerosis diagnosed at age of 22.  Patient has bilateral leg weakness and diffuse muscle pains. Muscle pains are controlled with Suboxone. Patient reports the pain in th legs is intermittent, sharp squeezing in nature, the muscles are stiff and painful.  Patient is taking tizanidine as needed with some help Patient is being followed by neurologist in [place] Dr. Acevedo. Patient reports worsening stiffness in Legs and hands.      Patient has history of hypertension.  Patient was on verapamil with much better control of blood pressure and heart rate.  Patient is taking 1 clonidine at night as well without any problem for sleep as prescribed by psychiatrist     Patient has history of bipolar disorder and is taking Lamictal and Wellbutrin.  Patient reports symptoms are under good control.  Patient denies any crying spell, lack of energy, lack of interest, so feeling of guilt or worthlessness, no suicidal homicidal ideation.  Patient is taking Seroquel 100 mg for insomnia.     Patient last labs suggested impaired fasting glucose with fasting glucose of 140. A1c is ordered but not yet done.  Patient TSH was normal as well but T4 was slightly low.  Patient denies any weight gain, dry skin, hair loss, constipation, fatigue. Patient is taking Levothyroxine 25mcg daily.  Reports compliance with thyroid medication    Review of Systems   Constitutional:  Positive for weight loss (2 lb weight  loss). Negative for chills, diaphoresis, fever and malaise/fatigue.   HENT:  Negative for congestion, ear pain, sinus pain, sore throat and tinnitus.    Eyes:  Negative for blurred vision and photophobia.   Respiratory:  Negative for cough, hemoptysis, shortness of breath and wheezing.    Cardiovascular:  Negative for chest pain, palpitations, orthopnea, leg swelling and PND.   Gastrointestinal:  Negative for abdominal pain, blood in stool, constipation, diarrhea, heartburn, melena, nausea and vomiting.   Genitourinary:  Negative for dysuria, frequency and urgency.   Musculoskeletal:  Positive for myalgias. Negative for back pain and neck pain.   Skin:  Negative for rash.   Neurological:  Negative for dizziness, tremors, seizures, loss of consciousness and weakness.   Endo/Heme/Allergies:  Negative for polydipsia.   Psychiatric/Behavioral:  Negative for depression (Improved) and hallucinations. The patient does not have insomnia.    Objective:     Physical Exam  Constitutional:       General: She is not in acute distress.     Appearance: She is not diaphoretic.   Neck:      Thyroid: No thyromegaly.   Cardiovascular:      Rate and Rhythm: Normal rate and regular rhythm.      Heart sounds: Normal heart sounds. No murmur heard.  Pulmonary:      Effort: Pulmonary effort is normal. No respiratory distress.      Breath sounds: Normal breath sounds. No wheezing.   Abdominal:      General: Bowel sounds are normal. There is no distension.      Palpations: Abdomen is soft.      Tenderness: There is no abdominal tenderness.   Musculoskeletal:      Comments: Wheelchair bound.  Bilateral leg strength is 1/5..   Lymphadenopathy:      Cervical: No cervical adenopathy.   Neurological:      Mental Status: She is alert and oriented to person, place, and time.   Psychiatric:         Behavior: Behavior normal.         Thought Content: Thought content normal.         Judgment: Judgment normal.     Assessment:       ICD-10-CM ICD-9-CM    1. Muscle cramps  R25.2 729.82   2. Nausea  R11.0 787.02   3. Multiple sclerosis  G35 340   4. Essential hypertension  I10 401.9   5. Impaired fasting glucose  R73.01 790.21       Plan:     Patient reports worsening muscle cramps and weakness in bilateral legs.  Patient has try to reach out to the neurologist without success  Advised to reach out to him and see if she needs to have steroids.  If needed patient may go to ER.  Patient complains of muscle cramps or which she takes tizanidine 4 mg 3 times a day.    Will refill the medication.  Patient has impaired fasting glucose repeat A1c was ordered but not yet done  Will print the order for patient to get labs drawn  Patient last TSH was normal but low T4  Will repeat TSH and T4  Will administer flu vaccine  Patient has a small 2 mm benign looking mole on right forearm..  No further workup is needed    Medication List with Changes/Refills   New Medications    TIZANIDINE (ZANAFLEX) 4 MG TABLET    Take 1 tablet (4 mg total) by mouth every 8 (eight) hours.   Current Medications    ALBUTEROL (PROVENTIL/VENTOLIN HFA) 90 MCG/ACTUATION INHALER    INHALE 2 PUFFS EVERY 6 HOURS AS NEEDED FOR WHEEZING    BUPRENORPHINE-NALOXONE 8-2 MG (SUBOXONE) 8-2 MG    Place 1 each under the tongue 2 (two) times a day.    CLONIDINE (CATAPRES) 0.1 MG TABLET    Take one tablet by mouth three times a day as needed for withdraw,  anxiety, insomnia. Wait at least 8 hours between doses    FLUTICASONE PROPIONATE (FLONASE) 50 MCG/ACTUATION NASAL SPRAY    1 spray (50 mcg total) by Each Nostril route once daily.    HEPARIN, PORCINE, PF, (HEPARIN FLUSH 100 UNITS/ML) 100 UNIT/ML SYRG        IBUPROFEN (ADVIL,MOTRIN) 800 MG TABLET    Take 800 mg by mouth 3 (three) times daily.    KESIMPTA PEN 20 MG/0.4 ML PNIJ        LAMOTRIGINE (LAMICTAL) 100 MG TABLET    Take 100 mg by mouth once daily.     LEVOTHYROXINE (SYNTHROID) 25 MCG TABLET    Take 2 tablets (50 mcg total) by mouth before breakfast.    NARCAN 4  MG/ACTUATION SPRY        NORMAL SALINE FLUSH INJECTION        QUETIAPINE (SEROQUEL) 400 MG TABLET    Take 200 mg by mouth every evening.    SUMATRIPTAN (IMITREX) 100 MG TABLET    Take 100 mg by mouth every 2 (two) hours as needed.     VERAPAMIL (CALAN-SR) 120 MG CR TABLET    Take 120 mg by mouth every morning.   Changed and/or Refilled Medications    Modified Medication Previous Medication    ONDANSETRON (ZOFRAN-ODT) 4 MG TBDL ondansetron (ZOFRAN-ODT) 4 MG TbDL       Take 1 tablet (4 mg total) by mouth every 6 (six) hours as needed (Nausea).    Take 4 mg by mouth every 6 (six) hours as needed.    Discontinued Medications    DULOXETINE (CYMBALTA) 30 MG CAPSULE    Take 1 capsule by mouth every morning.    ERGOCALCIFEROL (ERGOCALCIFEROL) 50,000 UNIT CAP    Take 2 capsules by mouth every 7 days.    NITROFURANTOIN, MACROCRYSTAL-MONOHYDRATE, (MACROBID) 100 MG CAPSULE    Take 1 capsule (100 mg total) by mouth 2 (two) times daily.    OMEPRAZOLE (PRILOSEC) 40 MG CAPSULE    TAKE 1 CAPSULE(40 MG) BY MOUTH EVERY DAY    TIZANIDINE (ZANAFLEX) 2 MG TABLET    Take 1 tablet (2 mg total) by mouth every 8 (eight) hours as needed (muscle cramps).

## 2022-11-16 ENCOUNTER — PATIENT MESSAGE (OUTPATIENT)
Dept: ADMINISTRATIVE | Facility: HOSPITAL | Age: 42
End: 2022-11-16
Payer: MEDICAID

## 2022-12-01 ENCOUNTER — PATIENT MESSAGE (OUTPATIENT)
Dept: PSYCHIATRY | Facility: CLINIC | Age: 42
End: 2022-12-01
Payer: MEDICAID

## 2022-12-02 ENCOUNTER — TELEPHONE (OUTPATIENT)
Dept: PRIMARY CARE CLINIC | Facility: CLINIC | Age: 42
End: 2022-12-02
Payer: MEDICAID

## 2022-12-02 NOTE — TELEPHONE ENCOUNTER
----- Message from Jackeline Carl LPN sent at 12/1/2022  5:04 PM CST -----    ----- Message -----  From: Linda Arzola  Sent: 12/1/2022   4:21 PM CST  To: Asia Horta Staff    Type:  Needs Medical Advice    Who Called: Janis Reese, ) pts , Brice )   Symptoms (please be specific): -   How long has patient had these symptoms:  -  Pharmacy name and phone #:  -  Would the patient rather a call back or a response via [facility]?    Best Call Back Number: [phone]    Additional Information: says the [facility]'s needs more information from Dr Betancourt regarding the wheelchair for pt please call them

## 2022-12-12 ENCOUNTER — TELEPHONE (OUTPATIENT)
Dept: PRIMARY CARE CLINIC | Facility: CLINIC | Age: 42
End: 2022-12-12

## 2022-12-12 NOTE — TELEPHONE ENCOUNTER
----- Message from Jackeline Carl LPN sent at 12/1/2022  5:04 PM CST -----    ----- Message -----  From: Linda Arzola  Sent: 12/1/2022   4:21 PM CST  To: Asia Horta Staff    Type:  Needs Medical Advice    Who Called: Janis Reese, ) pts , Brice )   Symptoms (please be specific): -   How long has patient had these symptoms:  -  Pharmacy name and phone #:  -  Would the patient rather a call back or a response via [facility]?    Best Call Back Number: [phone]    Additional Information: says the [facility]'s needs more information from Dr Betancourt regarding the wheelchair for pt please call them

## 2022-12-12 NOTE — TELEPHONE ENCOUNTER
Returned call to Gus states she sent for Authorization for patient wheelchair and will update once something is available.

## 2023-02-20 ENCOUNTER — PATIENT MESSAGE (OUTPATIENT)
Dept: PSYCHIATRY | Facility: CLINIC | Age: 43
End: 2023-02-20
Payer: MEDICAID

## 2023-03-31 ENCOUNTER — PATIENT MESSAGE (OUTPATIENT)
Dept: FAMILY MEDICINE | Facility: CLINIC | Age: 43
End: 2023-03-31
Payer: MEDICAID

## 2023-04-07 ENCOUNTER — HOSPITAL ENCOUNTER (INPATIENT)
Facility: HOSPITAL | Age: 43
LOS: 2 days | Discharge: HOME | DRG: 440 | End: 2023-04-09
Attending: EMERGENCY MEDICINE | Admitting: STUDENT IN AN ORGANIZED HEALTH CARE EDUCATION/TRAINING PROGRAM
Payer: MEDICAID

## 2023-04-07 DIAGNOSIS — K85.90 ACUTE PANCREATITIS: Primary | ICD-10-CM

## 2023-04-07 DIAGNOSIS — E87.6 HYPOKALEMIA: ICD-10-CM

## 2023-04-07 DIAGNOSIS — R10.12 LUQ ABDOMINAL PAIN: ICD-10-CM

## 2023-04-07 DIAGNOSIS — F31.9 BIPOLAR 1 DISORDER: ICD-10-CM

## 2023-04-07 DIAGNOSIS — I10 ESSENTIAL HYPERTENSION: ICD-10-CM

## 2023-04-07 DIAGNOSIS — R07.9 CHEST PAIN: ICD-10-CM

## 2023-04-07 LAB
ALBUMIN SERPL BCP-MCNC: 4.3 G/DL (ref 3.5–5.2)
ALP SERPL-CCNC: 127 U/L (ref 55–135)
ALT SERPL W/O P-5'-P-CCNC: 85 U/L (ref 10–44)
ANION GAP SERPL CALC-SCNC: 17 MMOL/L (ref 8–16)
AST SERPL-CCNC: 56 U/L (ref 10–40)
B-HCG UR QL: NEGATIVE
BACTERIA #/AREA URNS HPF: ABNORMAL /HPF
BASOPHILS # BLD AUTO: 0.03 K/UL (ref 0–0.2)
BASOPHILS NFR BLD: 0.3 % (ref 0–1.9)
BILIRUB SERPL-MCNC: 0.8 MG/DL (ref 0.1–1)
BILIRUB UR QL STRIP: NEGATIVE
BNP SERPL-MCNC: <10 PG/ML (ref 0–99)
BUN SERPL-MCNC: 5 MG/DL (ref 6–20)
CALCIUM SERPL-MCNC: 9.4 MG/DL (ref 8.7–10.5)
CHLORIDE SERPL-SCNC: 101 MMOL/L (ref 95–110)
CLARITY UR: ABNORMAL
CO2 SERPL-SCNC: 23 MMOL/L (ref 23–29)
COLOR UR: YELLOW
CREAT SERPL-MCNC: 0.8 MG/DL (ref 0.5–1.4)
CTP QC/QA: YES
D DIMER PPP IA.FEU-MCNC: 2.03 MG/L FEU
DIFFERENTIAL METHOD: ABNORMAL
EOSINOPHIL # BLD AUTO: 0.1 K/UL (ref 0–0.5)
EOSINOPHIL NFR BLD: 1.1 % (ref 0–8)
ERYTHROCYTE [DISTWIDTH] IN BLOOD BY AUTOMATED COUNT: 11.9 % (ref 11.5–14.5)
EST. GFR  (NO RACE VARIABLE): >60 ML/MIN/1.73 M^2
GLUCOSE SERPL-MCNC: 170 MG/DL (ref 70–110)
GLUCOSE UR QL STRIP: NEGATIVE
HCT VFR BLD AUTO: 39 % (ref 37–48.5)
HGB BLD-MCNC: 13.8 G/DL (ref 12–16)
HGB UR QL STRIP: NEGATIVE
HYALINE CASTS #/AREA URNS LPF: 0 /LPF
IMM GRANULOCYTES # BLD AUTO: 0.03 K/UL (ref 0–0.04)
IMM GRANULOCYTES NFR BLD AUTO: 0.3 % (ref 0–0.5)
KETONES UR QL STRIP: ABNORMAL
LEUKOCYTE ESTERASE UR QL STRIP: ABNORMAL
LIPASE SERPL-CCNC: 228 U/L (ref 4–60)
LYMPHOCYTES # BLD AUTO: 1.3 K/UL (ref 1–4.8)
LYMPHOCYTES NFR BLD: 13.9 % (ref 18–48)
MCH RBC QN AUTO: 31.3 PG (ref 27–31)
MCHC RBC AUTO-ENTMCNC: 35.4 G/DL (ref 32–36)
MCV RBC AUTO: 88 FL (ref 82–98)
MICROSCOPIC COMMENT: ABNORMAL
MONOCYTES # BLD AUTO: 0.6 K/UL (ref 0.3–1)
MONOCYTES NFR BLD: 6.5 % (ref 4–15)
NEUTROPHILS # BLD AUTO: 7.2 K/UL (ref 1.8–7.7)
NEUTROPHILS NFR BLD: 77.9 % (ref 38–73)
NITRITE UR QL STRIP: NEGATIVE
NRBC BLD-RTO: 0 /100 WBC
PH UR STRIP: 7 [PH] (ref 5–8)
PLATELET # BLD AUTO: 289 K/UL (ref 150–450)
PMV BLD AUTO: 9.6 FL (ref 9.2–12.9)
POTASSIUM SERPL-SCNC: 3.5 MMOL/L (ref 3.5–5.1)
PROT SERPL-MCNC: 7.4 G/DL (ref 6–8.4)
PROT UR QL STRIP: ABNORMAL
RBC # BLD AUTO: 4.41 M/UL (ref 4–5.4)
RBC #/AREA URNS HPF: 9 /HPF (ref 0–4)
SODIUM SERPL-SCNC: 141 MMOL/L (ref 136–145)
SP GR UR STRIP: 1.01 (ref 1–1.03)
SQUAMOUS #/AREA URNS HPF: 13 /HPF
TRIGL SERPL-MCNC: 247 MG/DL (ref 30–150)
TRIGL SERPL-MCNC: 350 MG/DL (ref 30–150)
TROPONIN I SERPL DL<=0.01 NG/ML-MCNC: 0.01 NG/ML (ref 0–0.03)
TROPONIN I SERPL DL<=0.01 NG/ML-MCNC: <0.006 NG/ML (ref 0–0.03)
TSH SERPL DL<=0.005 MIU/L-ACNC: 1.3 UIU/ML (ref 0.4–4)
URN SPEC COLLECT METH UR: ABNORMAL
UROBILINOGEN UR STRIP-ACNC: NEGATIVE EU/DL
WBC # BLD AUTO: 9.26 K/UL (ref 3.9–12.7)
WBC #/AREA URNS HPF: 10 /HPF (ref 0–5)

## 2023-04-07 PROCEDURE — 93005 ELECTROCARDIOGRAM TRACING: CPT

## 2023-04-07 PROCEDURE — 96375 TX/PRO/DX INJ NEW DRUG ADDON: CPT

## 2023-04-07 PROCEDURE — 93010 ELECTROCARDIOGRAM REPORT: CPT | Mod: ,,, | Performed by: INTERNAL MEDICINE

## 2023-04-07 PROCEDURE — 21400001 HC TELEMETRY ROOM

## 2023-04-07 PROCEDURE — 83690 ASSAY OF LIPASE: CPT | Performed by: EMERGENCY MEDICINE

## 2023-04-07 PROCEDURE — 81025 URINE PREGNANCY TEST: CPT | Performed by: EMERGENCY MEDICINE

## 2023-04-07 PROCEDURE — 83880 ASSAY OF NATRIURETIC PEPTIDE: CPT | Performed by: EMERGENCY MEDICINE

## 2023-04-07 PROCEDURE — 84443 ASSAY THYROID STIM HORMONE: CPT | Performed by: EMERGENCY MEDICINE

## 2023-04-07 PROCEDURE — 80053 COMPREHEN METABOLIC PANEL: CPT | Performed by: EMERGENCY MEDICINE

## 2023-04-07 PROCEDURE — 25000003 PHARM REV CODE 250: Performed by: STUDENT IN AN ORGANIZED HEALTH CARE EDUCATION/TRAINING PROGRAM

## 2023-04-07 PROCEDURE — 84478 ASSAY OF TRIGLYCERIDES: CPT | Performed by: EMERGENCY MEDICINE

## 2023-04-07 PROCEDURE — 63600175 PHARM REV CODE 636 W HCPCS: Performed by: STUDENT IN AN ORGANIZED HEALTH CARE EDUCATION/TRAINING PROGRAM

## 2023-04-07 PROCEDURE — 25500020 PHARM REV CODE 255: Performed by: EMERGENCY MEDICINE

## 2023-04-07 PROCEDURE — 85379 FIBRIN DEGRADATION QUANT: CPT | Performed by: EMERGENCY MEDICINE

## 2023-04-07 PROCEDURE — 96365 THER/PROPH/DIAG IV INF INIT: CPT

## 2023-04-07 PROCEDURE — 25000003 PHARM REV CODE 250: Performed by: EMERGENCY MEDICINE

## 2023-04-07 PROCEDURE — 84484 ASSAY OF TROPONIN QUANT: CPT | Mod: 91 | Performed by: EMERGENCY MEDICINE

## 2023-04-07 PROCEDURE — 85025 COMPLETE CBC W/AUTO DIFF WBC: CPT | Performed by: EMERGENCY MEDICINE

## 2023-04-07 PROCEDURE — 96376 TX/PRO/DX INJ SAME DRUG ADON: CPT

## 2023-04-07 PROCEDURE — 63600175 PHARM REV CODE 636 W HCPCS: Performed by: HOSPITALIST

## 2023-04-07 PROCEDURE — 81000 URINALYSIS NONAUTO W/SCOPE: CPT | Performed by: EMERGENCY MEDICINE

## 2023-04-07 PROCEDURE — 63600175 PHARM REV CODE 636 W HCPCS: Performed by: EMERGENCY MEDICINE

## 2023-04-07 PROCEDURE — 93010 EKG 12-LEAD: ICD-10-PCS | Mod: ,,, | Performed by: INTERNAL MEDICINE

## 2023-04-07 PROCEDURE — 99285 EMERGENCY DEPT VISIT HI MDM: CPT | Mod: 25

## 2023-04-07 RX ORDER — LABETALOL HYDROCHLORIDE 5 MG/ML
10 INJECTION, SOLUTION INTRAVENOUS
Status: COMPLETED | OUTPATIENT
Start: 2023-04-07 | End: 2023-04-07

## 2023-04-07 RX ORDER — QUETIAPINE FUMARATE 200 MG/1
200 TABLET, FILM COATED ORAL NIGHTLY
COMMUNITY
Start: 2022-12-01 | End: 2023-04-07 | Stop reason: CLARIF

## 2023-04-07 RX ORDER — CLONIDINE 0.3 MG/24H
1 PATCH, EXTENDED RELEASE TRANSDERMAL
Status: DISCONTINUED | OUTPATIENT
Start: 2023-04-07 | End: 2023-04-09 | Stop reason: HOSPADM

## 2023-04-07 RX ORDER — LABETALOL 200 MG/1
200 TABLET, FILM COATED ORAL 2 TIMES DAILY
COMMUNITY
Start: 2023-03-25 | End: 2023-04-07 | Stop reason: CLARIF

## 2023-04-07 RX ORDER — HYDROMORPHONE HYDROCHLORIDE 1 MG/ML
1 INJECTION, SOLUTION INTRAMUSCULAR; INTRAVENOUS; SUBCUTANEOUS
Status: COMPLETED | OUTPATIENT
Start: 2023-04-07 | End: 2023-04-07

## 2023-04-07 RX ORDER — LABETALOL HYDROCHLORIDE 5 MG/ML
10 INJECTION, SOLUTION INTRAVENOUS
Status: ACTIVE | OUTPATIENT
Start: 2023-04-07 | End: 2023-04-08

## 2023-04-07 RX ORDER — ESCITALOPRAM OXALATE 10 MG/1
10 TABLET ORAL DAILY
Status: DISCONTINUED | OUTPATIENT
Start: 2023-04-08 | End: 2023-04-09 | Stop reason: HOSPADM

## 2023-04-07 RX ORDER — DEXTROSE 40 %
30 GEL (GRAM) ORAL
Status: DISCONTINUED | OUTPATIENT
Start: 2023-04-07 | End: 2023-04-09 | Stop reason: HOSPADM

## 2023-04-07 RX ORDER — GLUCAGON 1 MG
1 KIT INJECTION
Status: DISCONTINUED | OUTPATIENT
Start: 2023-04-07 | End: 2023-04-09 | Stop reason: HOSPADM

## 2023-04-07 RX ORDER — NALOXONE HCL 0.4 MG/ML
0.02 VIAL (ML) INJECTION
Status: DISCONTINUED | OUTPATIENT
Start: 2023-04-07 | End: 2023-04-09 | Stop reason: HOSPADM

## 2023-04-07 RX ORDER — ONDANSETRON 2 MG/ML
4 INJECTION INTRAMUSCULAR; INTRAVENOUS
Status: COMPLETED | OUTPATIENT
Start: 2023-04-07 | End: 2023-04-07

## 2023-04-07 RX ORDER — SODIUM CHLORIDE 0.9 % (FLUSH) 0.9 %
10 SYRINGE (ML) INJECTION EVERY 12 HOURS PRN
Status: DISCONTINUED | OUTPATIENT
Start: 2023-04-07 | End: 2023-04-09 | Stop reason: HOSPADM

## 2023-04-07 RX ORDER — VERAPAMIL HYDROCHLORIDE 120 MG/1
120 TABLET, FILM COATED, EXTENDED RELEASE ORAL DAILY
Status: DISCONTINUED | OUTPATIENT
Start: 2023-04-08 | End: 2023-04-09 | Stop reason: HOSPADM

## 2023-04-07 RX ORDER — SODIUM CHLORIDE, SODIUM LACTATE, POTASSIUM CHLORIDE, CALCIUM CHLORIDE 600; 310; 30; 20 MG/100ML; MG/100ML; MG/100ML; MG/100ML
INJECTION, SOLUTION INTRAVENOUS CONTINUOUS
Status: DISCONTINUED | OUTPATIENT
Start: 2023-04-07 | End: 2023-04-09 | Stop reason: HOSPADM

## 2023-04-07 RX ORDER — QUETIAPINE FUMARATE 100 MG/1
100 TABLET, FILM COATED ORAL NIGHTLY
Status: DISCONTINUED | OUTPATIENT
Start: 2023-04-07 | End: 2023-04-09 | Stop reason: HOSPADM

## 2023-04-07 RX ORDER — LAMOTRIGINE 100 MG/1
100 TABLET ORAL DAILY
COMMUNITY
End: 2023-04-07 | Stop reason: CLARIF

## 2023-04-07 RX ORDER — BUPROPION HYDROCHLORIDE 150 MG/1
300 TABLET ORAL DAILY
Status: DISCONTINUED | OUTPATIENT
Start: 2023-04-08 | End: 2023-04-09 | Stop reason: HOSPADM

## 2023-04-07 RX ORDER — DEXTROSE 40 %
15 GEL (GRAM) ORAL
Status: DISCONTINUED | OUTPATIENT
Start: 2023-04-07 | End: 2023-04-09 | Stop reason: HOSPADM

## 2023-04-07 RX ORDER — ESCITALOPRAM OXALATE 10 MG/1
10 TABLET ORAL DAILY
COMMUNITY

## 2023-04-07 RX ORDER — LEVOTHYROXINE SODIUM 50 UG/1
50 TABLET ORAL
Status: DISCONTINUED | OUTPATIENT
Start: 2023-04-08 | End: 2023-04-09 | Stop reason: HOSPADM

## 2023-04-07 RX ORDER — TIZANIDINE 4 MG/1
4 TABLET ORAL
Status: COMPLETED | OUTPATIENT
Start: 2023-04-07 | End: 2023-04-07

## 2023-04-07 RX ORDER — ALBUTEROL SULFATE 2.5 MG/.5ML
2.5 SOLUTION RESPIRATORY (INHALATION) EVERY 4 HOURS PRN
Status: DISCONTINUED | OUTPATIENT
Start: 2023-04-07 | End: 2023-04-09 | Stop reason: HOSPADM

## 2023-04-07 RX ORDER — LAMOTRIGINE 50 MG/1
50 TABLET, ORALLY DISINTEGRATING ORAL NIGHTLY
COMMUNITY
End: 2023-08-20 | Stop reason: DRUGHIGH

## 2023-04-07 RX ORDER — LAMOTRIGINE 25 MG/1
50 TABLET ORAL NIGHTLY
Status: DISCONTINUED | OUTPATIENT
Start: 2023-04-07 | End: 2023-04-09 | Stop reason: HOSPADM

## 2023-04-07 RX ORDER — TIZANIDINE 4 MG/1
4 TABLET ORAL EVERY 8 HOURS
Status: DISCONTINUED | OUTPATIENT
Start: 2023-04-07 | End: 2023-04-08

## 2023-04-07 RX ORDER — ACETAMINOPHEN 325 MG/1
650 TABLET ORAL EVERY 4 HOURS PRN
Status: DISCONTINUED | OUTPATIENT
Start: 2023-04-07 | End: 2023-04-09 | Stop reason: HOSPADM

## 2023-04-07 RX ORDER — KETOROLAC TROMETHAMINE 30 MG/ML
30 INJECTION, SOLUTION INTRAMUSCULAR; INTRAVENOUS ONCE
Status: COMPLETED | OUTPATIENT
Start: 2023-04-07 | End: 2023-04-07

## 2023-04-07 RX ORDER — LAMOTRIGINE 100 MG/1
100 TABLET ORAL DAILY
Status: DISCONTINUED | OUTPATIENT
Start: 2023-04-08 | End: 2023-04-09 | Stop reason: HOSPADM

## 2023-04-07 RX ORDER — BUPRENORPHINE AND NALOXONE 2; .5 MG/1; MG/1
4 FILM, SOLUBLE BUCCAL; SUBLINGUAL 2 TIMES DAILY
Status: DISCONTINUED | OUTPATIENT
Start: 2023-04-07 | End: 2023-04-09 | Stop reason: HOSPADM

## 2023-04-07 RX ORDER — LABETALOL 100 MG/1
200 TABLET, FILM COATED ORAL 2 TIMES DAILY
Status: DISCONTINUED | OUTPATIENT
Start: 2023-04-07 | End: 2023-04-09 | Stop reason: HOSPADM

## 2023-04-07 RX ORDER — HYDROMORPHONE HYDROCHLORIDE 1 MG/ML
1 INJECTION, SOLUTION INTRAMUSCULAR; INTRAVENOUS; SUBCUTANEOUS EVERY 4 HOURS PRN
Status: DISCONTINUED | OUTPATIENT
Start: 2023-04-07 | End: 2023-04-09

## 2023-04-07 RX ORDER — BUPROPION HYDROCHLORIDE 300 MG/1
300 TABLET ORAL DAILY
COMMUNITY
Start: 2022-12-29

## 2023-04-07 RX ORDER — ERGOCALCIFEROL 1.25 MG/1
50000 CAPSULE ORAL
COMMUNITY
Start: 2022-11-14 | End: 2023-05-15

## 2023-04-07 RX ORDER — LABETALOL 200 MG/1
200 TABLET, FILM COATED ORAL 2 TIMES DAILY
COMMUNITY
End: 2023-08-20

## 2023-04-07 RX ADMIN — LAMOTRIGINE 50 MG: 25 TABLET ORAL at 08:04

## 2023-04-07 RX ADMIN — TIZANIDINE 4 MG: 4 TABLET ORAL at 06:04

## 2023-04-07 RX ADMIN — ONDANSETRON 4 MG: 2 INJECTION INTRAMUSCULAR; INTRAVENOUS at 11:04

## 2023-04-07 RX ADMIN — HYDROMORPHONE HYDROCHLORIDE 1 MG: 1 INJECTION, SOLUTION INTRAMUSCULAR; INTRAVENOUS; SUBCUTANEOUS at 12:04

## 2023-04-07 RX ADMIN — SODIUM CHLORIDE, POTASSIUM CHLORIDE, SODIUM LACTATE AND CALCIUM CHLORIDE: 600; 310; 30; 20 INJECTION, SOLUTION INTRAVENOUS at 05:04

## 2023-04-07 RX ADMIN — HYDROMORPHONE HYDROCHLORIDE 1 MG: 1 INJECTION, SOLUTION INTRAMUSCULAR; INTRAVENOUS; SUBCUTANEOUS at 04:04

## 2023-04-07 RX ADMIN — HYDROMORPHONE HYDROCHLORIDE 1 MG: 1 INJECTION, SOLUTION INTRAMUSCULAR; INTRAVENOUS; SUBCUTANEOUS at 09:04

## 2023-04-07 RX ADMIN — TIZANIDINE 4 MG: 4 TABLET ORAL at 01:04

## 2023-04-07 RX ADMIN — LABETALOL HYDROCHLORIDE 10 MG: 5 INJECTION, SOLUTION INTRAVENOUS at 01:04

## 2023-04-07 RX ADMIN — PROMETHAZINE HYDROCHLORIDE 25 MG: 25 INJECTION INTRAMUSCULAR; INTRAVENOUS at 12:04

## 2023-04-07 RX ADMIN — LABETALOL HYDROCHLORIDE 200 MG: 100 TABLET, FILM COATED ORAL at 08:04

## 2023-04-07 RX ADMIN — LABETALOL HYDROCHLORIDE 10 MG: 5 INJECTION, SOLUTION INTRAVENOUS at 11:04

## 2023-04-07 RX ADMIN — KETOROLAC TROMETHAMINE 30 MG: 30 INJECTION, SOLUTION INTRAMUSCULAR; INTRAVENOUS at 07:04

## 2023-04-07 RX ADMIN — IOHEXOL 75 ML: 350 INJECTION, SOLUTION INTRAVENOUS at 01:04

## 2023-04-07 RX ADMIN — QUETIAPINE FUMARATE 100 MG: 100 TABLET ORAL at 08:04

## 2023-04-07 RX ADMIN — CLONIDINE 1 PATCH: 0.3 PATCH TRANSDERMAL at 11:04

## 2023-04-07 RX ADMIN — HYDROMORPHONE HYDROCHLORIDE 1 MG: 1 INJECTION, SOLUTION INTRAMUSCULAR; INTRAVENOUS; SUBCUTANEOUS at 02:04

## 2023-04-07 RX ADMIN — HYDROMORPHONE HYDROCHLORIDE 1 MG: 1 INJECTION, SOLUTION INTRAMUSCULAR; INTRAVENOUS; SUBCUTANEOUS at 11:04

## 2023-04-07 NOTE — ED NOTES
Right chest [id number] accessed with sterile technique maintained. Pt tolerated well, blood return noted and saline locked.

## 2023-04-07 NOTE — NURSING
Patient admitted to Telemetry room 333 from ED secondary to Acute Pancreatitis.   Patient awake, alert and fully oriented, accompanied by spouse, Lane.  Wheelchair bound.   BP elevated: 164/102.   C/O 8/10 pain & rigidity to BLE and requesting Tizanidine.   On clear liquid diet.     Reviewed orders with family.  Contacted Dr. Reese for order for Tizanidine.  Acclimated to room/environment.  Applied tele monitor [id number].   Continued LR@125 ml/hr.   Given Tizanidine for BLE rigidity/pain.     Will continue to f/u.

## 2023-04-07 NOTE — HPI
Mrs. Reese is a 43 yo F with PMHx of HTN, chronic pain on suboxone, Multiple Sclerosis who presents to the ED for evaluation of abdominal pain.  Patient states the pain started about 3 days ago and has continually gotten worse.  Patient states she was diagnosed with pancreatitis about 4 months ago but was never told why she had this.  After discussing further with patient and her , they had just went out to dinner and had multiple alcoholic drinks the night prior to her abdominal pain starting.  Denies nausea, vomiting, diarrhea.  No fevers or chills.  In the emergency department the patient was found to have an elevated lipase at 2:35 and imaging consistent with pancreatitis.  She was given IV fluids and IV pain medications.  She will be admitted to hospital medicine for further management.

## 2023-04-07 NOTE — PHARMACY MED REC
"Admission Medication History     The home medication history was taken by Tisha Mary.    You may go to "Admission" then "Reconcile Home Medications" tabs to review and/or act upon these items.     The home medication list has been updated by the Pharmacy department.   Please read ALL comments highlighted in yellow.   Please address this information as you see fit.    Feel free to contact us if you have any questions or require assistance.    Medications listed below were obtained from: Patient/family and Analytic software- [employer]  (Not in a hospital admission)          Tisha Mary  [phone]                 .        "

## 2023-04-07 NOTE — NURSING
[facility] [facility] [place] 4 [place]      4/7/2023       Skin assessed on: Q Shift      [x] No Pressure Injuries Present    [x]Prevention Measures Documented - Patient has purple bruise to her right hip secondary to falling at home.     [] Yes LDA  for Pressure Injury Previously documented     [] Yes New Pressure Injury Discovered   [] LDA for New Pressure Injury Added      Attending RN:  Delfina Osorio RN     Second RN:  DANN Ambrose (ED RN).

## 2023-04-07 NOTE — ED PROVIDER NOTES
Encounter Date: 2023       History     Chief Complaint   Patient presents with    Abdominal Pain     Pt BIB EMS c/o upper left quadrant pain x3 days. Pt slid out of chair onto buttocks. ABD pain increased after fall. Hx of pancreatitis and MS.     41yo female with history of pancreatitis, hypertension, multiple sclerosis presents to the emergency department for evaluation of left upper quadrant abdominal pain for the past 3 days.  Patient reports symptoms have been worsening and became severe today.  She reports associated vomiting.  Denies chest pain or shortness of breath.  No fevers or diarrhea.  Reports having a bowel movement this morning and states that she is passing gas.  Patient additionally reports sliding out of her wheelchair onto her buttocks after which her left upper quadrant abdominal pain worsened.  Denies back pain, new numbness, new weakness    Review of patient's allergies indicates:   Allergen Reactions    Metoclopramide hcl     Tramadol Anaphylaxis     Past Medical History:   Diagnosis Date    Depression     Hypertension     Multiple sclerosis      Past Surgical History:   Procedure Laterality Date     SECTION  2006    DILATION AND CURETTAGE OF UTERUS      LITHOTRIPSY      PORTACATH PLACEMENT Right 02/10/2020     Family History   Adopted: Yes     Social History     Tobacco Use    Smoking status: Former     Types: Vaping with nicotine    Smokeless tobacco: Never   Substance Use Topics    Alcohol use: Yes     Comment: occassionally    Drug use: Never     Review of Systems   Constitutional:  Negative for fever.   HENT:  Negative for facial swelling and voice change.    Eyes:  Negative for pain.   Respiratory:  Negative for choking and shortness of breath.    Cardiovascular:  Negative for chest pain.   Gastrointestinal:  Positive for abdominal pain, nausea and vomiting. Negative for diarrhea.   Genitourinary:  Negative for dysuria and frequency.   Musculoskeletal:  Negative for  back pain.   Skin:  Negative for rash.   Neurological:  Negative for weakness and numbness.   Psychiatric/Behavioral:  Negative for self-injury.      Physical Exam     Initial Vitals [04/07/23 1029]   BP Pulse Resp Temp SpO2   (!) 190/126 (!) 140 (!) 22 98.9 °F (37.2 °C) 98 %      MAP       --         Physical Exam    Nursing note and vitals reviewed.  Constitutional: She is diaphoretic. She appears distressed.   HENT:   Head: Normocephalic and atraumatic.   Protecting airway   Eyes: Conjunctivae and EOM are normal. No scleral icterus.   Neck: Neck supple. No tracheal deviation present.   Normal range of motion.  Cardiovascular:  Regular rhythm and intact distal pulses.           Tachycardic   Pulmonary/Chest: No stridor. No respiratory distress.   Speaking in full sentences   Abdominal: Abdomen is soft. She exhibits distension. There is abdominal tenderness (left upper quadrant). There is no rebound and no guarding.   Musculoskeletal:         General: No tenderness or edema.      Cervical back: Normal range of motion and neck supple.     Neurological: She is alert. She has normal strength. No cranial nerve deficit or sensory deficit.   Skin: Skin is warm.   Psychiatric: She has a normal mood and affect.       ED Course   Procedures  Labs Reviewed   CBC W/ AUTO DIFFERENTIAL - Abnormal; Notable for the following components:       Result Value    MCH 31.3 (*)     Gran % 77.9 (*)     Lymph % 13.9 (*)     All other components within normal limits   COMPREHENSIVE METABOLIC PANEL - Abnormal; Notable for the following components:    Glucose 170 (*)     BUN 5 (*)     AST 56 (*)     ALT 85 (*)     Anion Gap 17 (*)     All other components within normal limits   D DIMER, QUANTITATIVE - Abnormal; Notable for the following components:    D-Dimer 2.03 (*)     All other components within normal limits   LIPASE - Abnormal; Notable for the following components:    Lipase 228 (*)     All other components within normal limits    URINALYSIS, REFLEX TO URINE CULTURE - Abnormal; Notable for the following components:    Appearance, UA Hazy (*)     Protein, UA 1+ (*)     Ketones, UA Trace (*)     Leukocytes, UA 1+ (*)     All other components within normal limits    Narrative:     Specimen Source->Urine   URINALYSIS MICROSCOPIC - Abnormal; Notable for the following components:    RBC, UA 9 (*)     WBC, UA 10 (*)     All other components within normal limits    Narrative:     Specimen Source->Urine   TRIGLYCERIDES - Abnormal; Notable for the following components:    Triglycerides 350 (*)     All other components within normal limits   TROPONIN I   TROPONIN I   B-TYPE NATRIURETIC PEPTIDE   TSH   TRIGLYCERIDES   POCT URINE PREGNANCY     EKG Readings: (Independently Interpreted)   Initial Reading: No STEMI. Rhythm: Normal Sinus Rhythm. Heart Rate: 134. Ectopy: No Ectopy. Conduction: Normal. ST Segments: Normal ST Segments. T Waves: Normal. Axis: Normal.     Imaging Results               CTA Chest Non-Coronary (PE Studies) (Final result)  Result time 04/07/23 14:05:20      Final result by Ashu Nunes MD (04/07/23 14:05:20)                   Impression:      1. No acute cardiopulmonary process.  Specifically, no evidence of pulmonary thromboembolism or pulmonary infarction.  2. Findings suggesting sequela of acute, uncomplicated mild pancreatitis.  3. Hepatomegaly and hepatic steatosis.  4. Markedly atrophic polycystic right kidney.  5. Left renal nonobstructing nephrolithiasis.  Left renal simple cyst and several additional subcentimeter hypoattenuating parenchymal foci which are too small to characterize.  6. Moderate to large colonic stool burden without evidence of bowel obstruction or acute inflammation.  7. Few additional findings as above.  This report was flagged in Epic as abnormal.  This report was flagged in Epic as containing an incidental finding.      Electronically signed by: Ashu Nunes MD  Date:    04/07/2023  Time:    14:05                Narrative:    EXAMINATION:  CT ABDOMEN PELVIS WITH CONTRAST; CTA CHEST NON CORONARY (PE STUDIES)    CLINICAL HISTORY:  Pancreatitis, acute, severe;; Pulmonary embolism (PE) suspected, positive D-dimer;    TECHNIQUE:  Axial images were obtained through the chest following the administration of 100 cc Omnipaque 350 IV contrast per PE protocol.  Subsequent delayed portal venous phase axial images were obtained through the abdomen and pelvis.  Coronal and sagittal reformatted images and axial MIPS were generated.    COMPARISON:  Chest radiograph same day and 06/03/2014, MRI thoracic spine 08/02/2011    FINDINGS:  Structures at the base of the neck are within normal limits.    Partially imaged thoracic soft tissues: No significant abnormality.    Aorta: Normal in course and caliber, without significant atherosclerotic plaque. There are three branching vessels at the arch.    Heart: Normal in size. No pericardial effusion.    Diya/Mediastinum: No significant lymphadenopathy    Lungs: Well expanded.  Minimal biapical pleuroparenchymal scarring.  Minimal dependent atelectasis.  Few scattered areas of minimal platelike scarring versus atelectasis.  No consolidation, pleural effusion, pneumothorax, pleural thickening or pleural calcifications.  No concerning pulmonary nodules.  Small pulmonary cyst within the medial aspect of the left upper lobe.    Liver: Enlarged measuring 20 cm in coronal length with diffuse parenchymal hypoattenuation and peripheral sparing.  No focal hepatic parenchymal lesion seen.    Gallbladder: No calcified gallstones.    Bile Ducts: No evidence of dilated ducts.    Pancreas: Normal in morphology with mild degree of peripancreatic fat stranding.  No pancreatic mass, pseudocyst, parenchymal calcification or ductal dilatation.  No peripancreatic fluid collection seen.    Spleen: Unremarkable.    Adrenals: Unremarkable.    Kidneys/ Ureters: Right kidney is markedly atrophic with several  parenchymal cysts with the largest measuring 2.3 cm.  Left kidney is upper limits of normal in size, but otherwise normal in location with normal enhancement.  2 mm calculus at the left renal interpolar region and 3 mm calculus at the left renal lower pole.  No hydronephrosis or significant perinephric stranding.  1.5 cm simple appearing parenchymal cyst within the posterior aspect of the left renal interpolar region.  Additional subcentimeter hypoattenuating parenchymal foci which are too small to characterize.  No ureteral dilatation.    Bladder: Suboptimally distended.    Reproductive organs: 1.8 cm oval hypodense structure at the right adnexa suggestive of a dominant follicle.  No left adnexal mass.  Uterus is within normal limits.  Few scattered punctate pelvic phleboliths noted.  No significant free fluid in the pelvis.    GI Tract/Mesentery: Stomach is within normal limits.  Duodenum is normal in morphology noting mild degree of adjacent fat stranding presumably related to pancreatic inflammatory process tracking to this region.  No evidence of bowel obstruction or inflammation. Moderate to large amount of stool noted throughout the colon and rectum.  Appendix and terminal ileum are within normal limits.  No pneumatosis or portal venous gas.    Peritoneal Space: Trace volume free fluid tracking along the upper right pericolic gutter and within the lesser sac, likely reactive.  No free air. No lymphadenopathy    Retroperitoneum: No significant adenopathy.    Abdominal wall: Tiny fat containing umbilical hernia.    Vasculature: Right upper chest vascular port terminates at the cavoatrial junction region.  No significant atherosclerosis, aneurysm or dissection.  Pulmonary trunk is within normal limits.  Pulmonary arteries distribute normally.  There are 4 main pulmonary veins draining to the left atrium.  No evidence of pulmonary thromboembolism or pulmonary infarction.  Portal vasculature is patent.    Bones:  Osseous structures show minimal degenerative change without acute or destructive process seen.                                        CT Abdomen Pelvis With Contrast (Final result)  Result time 04/07/23 14:05:20      Final result by Ashu Nunes MD (04/07/23 14:05:20)                   Impression:      1. No acute cardiopulmonary process.  Specifically, no evidence of pulmonary thromboembolism or pulmonary infarction.  2. Findings suggesting sequela of acute, uncomplicated mild pancreatitis.  3. Hepatomegaly and hepatic steatosis.  4. Markedly atrophic polycystic right kidney.  5. Left renal nonobstructing nephrolithiasis.  Left renal simple cyst and several additional subcentimeter hypoattenuating parenchymal foci which are too small to characterize.  6. Moderate to large colonic stool burden without evidence of bowel obstruction or acute inflammation.  7. Few additional findings as above.  This report was flagged in Epic as abnormal.  This report was flagged in Epic as containing an incidental finding.      Electronically signed by: Ashu Nunes MD  Date:    04/07/2023  Time:    14:05               Narrative:    EXAMINATION:  CT ABDOMEN PELVIS WITH CONTRAST; CTA CHEST NON CORONARY (PE STUDIES)    CLINICAL HISTORY:  Pancreatitis, acute, severe;; Pulmonary embolism (PE) suspected, positive D-dimer;    TECHNIQUE:  Axial images were obtained through the chest following the administration of 100 cc Omnipaque 350 IV contrast per PE protocol.  Subsequent delayed portal venous phase axial images were obtained through the abdomen and pelvis.  Coronal and sagittal reformatted images and axial MIPS were generated.    COMPARISON:  Chest radiograph same day and 06/03/2014, MRI thoracic spine 08/02/2011    FINDINGS:  Structures at the base of the neck are within normal limits.    Partially imaged thoracic soft tissues: No significant abnormality.    Aorta: Normal in course and caliber, without significant atherosclerotic  plaque. There are three branching vessels at the arch.    Heart: Normal in size. No pericardial effusion.    Diya/Mediastinum: No significant lymphadenopathy    Lungs: Well expanded.  Minimal biapical pleuroparenchymal scarring.  Minimal dependent atelectasis.  Few scattered areas of minimal platelike scarring versus atelectasis.  No consolidation, pleural effusion, pneumothorax, pleural thickening or pleural calcifications.  No concerning pulmonary nodules.  Small pulmonary cyst within the medial aspect of the left upper lobe.    Liver: Enlarged measuring 20 cm in coronal length with diffuse parenchymal hypoattenuation and peripheral sparing.  No focal hepatic parenchymal lesion seen.    Gallbladder: No calcified gallstones.    Bile Ducts: No evidence of dilated ducts.    Pancreas: Normal in morphology with mild degree of peripancreatic fat stranding.  No pancreatic mass, pseudocyst, parenchymal calcification or ductal dilatation.  No peripancreatic fluid collection seen.    Spleen: Unremarkable.    Adrenals: Unremarkable.    Kidneys/ Ureters: Right kidney is markedly atrophic with several parenchymal cysts with the largest measuring 2.3 cm.  Left kidney is upper limits of normal in size, but otherwise normal in location with normal enhancement.  2 mm calculus at the left renal interpolar region and 3 mm calculus at the left renal lower pole.  No hydronephrosis or significant perinephric stranding.  1.5 cm simple appearing parenchymal cyst within the posterior aspect of the left renal interpolar region.  Additional subcentimeter hypoattenuating parenchymal foci which are too small to characterize.  No ureteral dilatation.    Bladder: Suboptimally distended.    Reproductive organs: 1.8 cm oval hypodense structure at the right adnexa suggestive of a dominant follicle.  No left adnexal mass.  Uterus is within normal limits.  Few scattered punctate pelvic phleboliths noted.  No significant free fluid in the  pelvis.    GI Tract/Mesentery: Stomach is within normal limits.  Duodenum is normal in morphology noting mild degree of adjacent fat stranding presumably related to pancreatic inflammatory process tracking to this region.  No evidence of bowel obstruction or inflammation. Moderate to large amount of stool noted throughout the colon and rectum.  Appendix and terminal ileum are within normal limits.  No pneumatosis or portal venous gas.    Peritoneal Space: Trace volume free fluid tracking along the upper right pericolic gutter and within the lesser sac, likely reactive.  No free air. No lymphadenopathy    Retroperitoneum: No significant adenopathy.    Abdominal wall: Tiny fat containing umbilical hernia.    Vasculature: Right upper chest vascular port terminates at the cavoatrial junction region.  No significant atherosclerosis, aneurysm or dissection.  Pulmonary trunk is within normal limits.  Pulmonary arteries distribute normally.  There are 4 main pulmonary veins draining to the left atrium.  No evidence of pulmonary thromboembolism or pulmonary infarction.  Portal vasculature is patent.    Bones: Osseous structures show minimal degenerative change without acute or destructive process seen.                                       X-Ray Chest AP Portable (Final result)  Result time 04/07/23 12:56:09      Final result by Elisha Montelongo MD (04/07/23 12:56:09)                   Impression:      No acute abnormality.      Electronically signed by: Elisha Montelongo MD  Date:    04/07/2023  Time:    12:56               Narrative:    EXAMINATION:  XR CHEST AP PORTABLE    CLINICAL HISTORY:  Chest Pain;    TECHNIQUE:  Single frontal view of the chest was performed.    COMPARISON:  [facility] 3, 2014    FINDINGS:  Indwelling right subclavian central venous catheter has its tip overlying the expected location of the superior vena cava.The lungs are free of lobar consolidation and alveolar edema, with normal appearance of  pulmonary vasculature and no large pleural effusion or pneumothorax.    The cardiac silhouette is normal in size. The hilar and mediastinal contours are unremarkable.    Bones are intact.                                       Medications   cloNIDine 0.3 mg/24 hr td ptwk 1 patch (1 patch Transdermal Patch Applied 4/7/23 1130)   labetaloL injection 10 mg (10 mg Intravenous Not Given 4/7/23 1430)   buprenorphine-naloxone 2-0.5 mg SL film 4 Film (has no administration in time range)   levothyroxine tablet 50 mcg (has no administration in time range)   verapamiL CR tablet 120 mg (has no administration in time range)   sodium chloride 0.9% flush 10 mL (has no administration in time range)   naloxone 0.4 mg/mL injection 0.02 mg (has no administration in time range)   glucagon (human recombinant) injection 1 mg (has no administration in time range)   dextrose 10% bolus 125 mL 125 mL (has no administration in time range)   dextrose 10% bolus 250 mL 250 mL (has no administration in time range)   dextrose 40 % gel 15,000 mg (has no administration in time range)   dextrose 40 % gel 30,000 mg (has no administration in time range)   acetaminophen tablet 650 mg (has no administration in time range)   HYDROmorphone injection 1 mg (has no administration in time range)   albuterol sulfate nebulizer solution 2.5 mg (has no administration in time range)   lactated ringers infusion (has no administration in time range)   lamoTRIgine tablet 100 mg (has no administration in time range)   buPROPion TB24 tablet 300 mg (has no administration in time range)   lamoTRIgine tablet 50 mg (has no administration in time range)   QUEtiapine tablet 100 mg (has no administration in time range)   labetaloL tablet 200 mg (has no administration in time range)   EScitalopram oxalate tablet 10 mg (has no administration in time range)   HYDROmorphone injection 1 mg (1 mg Intravenous Given 4/7/23 1108)   ondansetron injection 4 mg (4 mg Intravenous Given  4/7/23 1108)   labetaloL injection 10 mg (10 mg Intravenous Given 4/7/23 1139)   HYDROmorphone injection 1 mg (1 mg Intravenous Given 4/7/23 1230)   promethazine (PHENERGAN) 25 mg in dextrose 5 % (D5W) 50 mL IVPB (0 mg Intravenous Stopped 4/7/23 1309)   tiZANidine tablet 4 mg (4 mg Oral Given 4/7/23 1302)   labetaloL injection 10 mg (10 mg Intravenous Given 4/7/23 1304)   iohexoL (OMNIPAQUE 350) injection 75 mL (75 mLs Intravenous Given 4/7/23 1345)   HYDROmorphone injection 1 mg (1 mg Intravenous Given 4/7/23 1445)     Medical Decision Making:   History:   Old Medical Records: I decided to obtain old medical records.  Old Records Summarized: records from clinic visits.       <> Summary of Records: Last evaluated in neurology clinic in 1/23.  Differential Diagnosis:   Includes but not limited to:  GERD, gastritis, gastroenteritis, hepatobiliary disease, pancreatitis, ACS, radiculopathy, constipation, bowel obstruction  Independently Interpreted Test(s):   I have ordered and independently interpreted EKG Reading(s) - see prior notes  Clinical Tests:   Lab Tests: Ordered and Reviewed  Radiological Study: Ordered and Reviewed  Medical Tests: Ordered and Reviewed  ED Management:  Patient presents in acute painful distress at time of history and physical.  She has epigastric and left upper quadrant tenderness.  She does not have an acute surgical abdomen labs without leukocytosis or significant electrolyte abnormality.  BNP, troponin within normal ranges lipase is significantly elevated at 228.  Triglycerides are 350.  Patient is not appear to require insulin infusion.  CTA of the chest does not demonstrate PE.  CT of the abdomen pelvis does have findings suggestive of acute pancreatitis.  Patient given multiple rounds of analgesia and antiemetic with some improvement in symptoms.  Patient was significantly hypertensive at triage.  She required IV labetalol for blood pressure improvement.  On reassessment patient is  resting comfortably in stretcher.  She is to be admitted to Hospital Medicine for further management of acute pancreatitis    Please put in 30 minutes of critical care due to patient having a severe hypertension requiring IV antihypertensives.   Separate from teaching and exclusive of procedure and ekg time  Includes:  Time at bedside  Time reviewing test results  Time discussing case with staff  Time documenting the medical record  Time spent with family members  Time spent with consults  Management    This chart was completed using dictation software, as a result there may be some transcription errors.                           Clinical Impression:   Final diagnoses:  [K85.90] Acute pancreatitis (Primary)  [R10.12] LUQ abdominal pain        ED Disposition Condition    Admit Stable                Brittney Rebollar MD  04/07/23 3360

## 2023-04-07 NOTE — Clinical Note
Diagnosis: Acute pancreatitis [577.0.ICD-9-CM]   Admitting Provider:: CAROL CEDILLO [[id number]]   Future Attending Provider: CAROL CEDILLO [[id number]]   Reason for IP Medical Treatment  (Clinical interventions that can only be accomplished in the IP setting? ) :: IV analgesia, IV hydration, antiemetics   I certify that Inpatient services for greater than or equal to 2 midnights are medically necessary:: Yes   Plans for Post-Acute care--if anticipated (pick the single best option):: C. Discharge home with home health services   Special Needs:: Fall Risk [15]

## 2023-04-07 NOTE — H&P
[facility] [place] Emergency Dept  [facility] Medicine  History & Physical    Patient Name: Janis Reese  MRN: [id number]  Patient Class: IP- Inpatient  Admission Date: 2023  Attending Physician: Fam Reese MD   Primary Care Provider: Rula Betancourt MD         Patient information was obtained from patient, spouse/SO and ER records.     Subjective:     Principal Problem:Acute pancreatitis    Chief Complaint:   Chief Complaint   Patient presents with    Abdominal Pain     Pt BIB EMS c/o upper left quadrant pain x3 days. Pt slid out of chair onto buttocks. ABD pain increased after fall. Hx of pancreatitis and MS.        HPI: Mrs. Reese is a 43 yo F with PMHx of HTN, chronic pain on suboxone, Multiple Sclerosis who presents to the ED for evaluation of abdominal pain.  Patient states the pain started about 3 days ago and has continually gotten worse.  Patient states she was diagnosed with pancreatitis about 4 months ago but was never told why she had this.  After discussing further with patient and her , they had just went out to dinner and had multiple alcoholic drinks the night prior to her abdominal pain starting.  Denies nausea, vomiting, diarrhea.  No fevers or chills.  In the emergency department the patient was found to have an elevated lipase at 2:35 and imaging consistent with pancreatitis.  She was given IV fluids and IV pain medications.  She will be admitted to hospital medicine for further management.        Past Medical History:   Diagnosis Date    Depression     Hypertension     Multiple sclerosis        Past Surgical History:   Procedure Laterality Date     SECTION  2006    DILATION AND CURETTAGE OF UTERUS      LITHOTRIPSY      PORTACATH PLACEMENT Right 02/10/2020       Review of patient's allergies indicates:   Allergen Reactions    Metoclopramide hcl     Tramadol Anaphylaxis       No current facility-administered medications on file prior to encounter.     Current  Outpatient Medications on File Prior to Encounter   Medication Sig    buPROPion (WELLBUTRIN XL) 300 MG 24 hr tablet 300 mg.    albuterol (PROVENTIL/VENTOLIN HFA) 90 mcg/actuation inhaler INHALE 2 PUFFS EVERY 6 HOURS AS NEEDED FOR WHEEZING    buprenorphine-naloxone 8-2 mg (SUBOXONE) 8-2 mg Place 1 each under the tongue 2 (two) times a day.    cloNIDine (CATAPRES) 0.1 MG tablet Take one tablet by mouth three times a day as needed for withdraw,  anxiety, insomnia. Wait at least 8 hours between doses    EScitalopram oxalate (LEXAPRO) 10 MG tablet 10 mg.    fluticasone propionate (FLONASE) 50 mcg/actuation nasal spray 1 spray (50 mcg total) by Each Nostril route once daily.    heparin, porcine, PF, (HEPARIN FLUSH 100 UNITS/ML) 100 unit/mL Syrg     ibuprofen (ADVIL,MOTRIN) 800 MG tablet Take 800 mg by mouth 3 (three) times daily.    KESIMPTA PEN 20 mg/0.4 mL PnIj     labetaloL (NORMODYNE) 200 MG tablet Take 200 mg by mouth 2 (two) times daily.    lamoTRIgine (LAMICTAL) 100 MG tablet Take 100 mg by mouth once daily.     lamoTRIgine (LAMICTAL) 100 MG tablet 100 mg.    levothyroxine (SYNTHROID) 25 MCG tablet Take 2 tablets (50 mcg total) by mouth before breakfast.    NARCAN 4 mg/actuation Spry     NORMAL SALINE FLUSH injection     ondansetron (ZOFRAN-ODT) 4 MG TbDL Take 1 tablet (4 mg total) by mouth every 6 (six) hours as needed (Nausea).    QUEtiapine (SEROQUEL) 200 MG Tab Take 200 mg by mouth every evening.    QUEtiapine (SEROQUEL) 400 MG tablet Take 200 mg by mouth every evening.    sumatriptan (IMITREX) 100 MG tablet Take 100 mg by mouth every 2 (two) hours as needed.     tiZANidine (ZANAFLEX) 4 MG tablet TAKE 1 TABLET BY MOUTH EVERY 8 HOURS    verapamiL (CALAN-SR) 120 MG CR tablet TAKE 1 TABLET BY MOUTH EVERY MORNING BEFORE BREAKFAST     Family History    None       Tobacco Use    Smoking status: Former     Types: Vaping with nicotine    Smokeless tobacco: Never   Substance and Sexual Activity     Alcohol use: Yes     Comment: occassionally    Drug use: Never    Sexual activity: Not on file     Review of Systems   Constitutional:  Negative for chills and fever.   Gastrointestinal:  Positive for abdominal pain.   Objective:     Vital Signs (Most Recent):  Temp: 98.9 °F (37.2 °C) (04/07/23 1536)  Pulse: 91 (04/07/23 1524)  Resp: 18 (04/07/23 1536)  BP: 136/86 (04/07/23 1532)  SpO2: 97 % (04/07/23 1536) Vital Signs (24h Range):  Temp:  [98.9 °F (37.2 °C)] 98.9 °F (37.2 °C)  Pulse:  [] 91  Resp:  [18-22] 18  SpO2:  [95 %-98 %] 97 %  BP: (136-190)/() 136/86     Weight: 69.4 kg (153 lb)  Body mass index is 28.91 kg/m².    Physical Exam  Vitals and nursing note reviewed.   Constitutional:       General: She is not in acute distress.     Appearance: She is obese. She is ill-appearing.   HENT:      Head: Normocephalic.      Mouth/Throat:      Mouth: Mucous membranes are dry.   Eyes:      Conjunctiva/sclera: Conjunctivae normal.   Cardiovascular:      Pulses: Normal pulses.      Heart sounds: Normal heart sounds.   Pulmonary:      Effort: Pulmonary effort is normal. No respiratory distress.   Abdominal:      General: Bowel sounds are normal. There is no distension.      Tenderness: There is abdominal tenderness.   Musculoskeletal:         General: Normal range of motion.   Skin:     General: Skin is warm and dry.   Neurological:      Mental Status: She is alert and oriented to person, place, and time. Mental status is at baseline.   Psychiatric:         Mood and Affect: Mood normal.         Thought Content: Thought content normal.           Significant Labs: All pertinent labs within the past 24 hours have been reviewed.    Significant Imaging: I have reviewed all pertinent imaging results/findings within the past 24 hours.    Assessment/Plan:     * Acute pancreatitis  Presents with 3 days of severe abdominal pain after a night drinking alcohol.  Triglycerides are elevated however not to the point  where this would be the etiology of pancreatitis.  Imaging did not reveal any stones or obstructive issues.  Continue with IV fluids   Continue with IV pain control   Clear liquids, advance diet as tolerated      Bipolar 1 disorder  Resume home medications      Essential hypertension  Resume home medications      Multiple sclerosis  Receives monthly injections  - No acute issues at this time  - at baseline      VTE Risk Mitigation (From admission, onward)         Ordered     IP VTE LOW RISK PATIENT  Once         04/07/23 1501     Place sequential compression device  Until discontinued         04/07/23 1501                           Fam Reese MD  Department of Hospital Medicine  [facility] - [place] - Emergency Dept

## 2023-04-07 NOTE — ASSESSMENT & PLAN NOTE
Presents with 3 days of severe abdominal pain after a night drinking alcohol.  Triglycerides are elevated however not to the point where this would be the etiology of pancreatitis.  Imaging did not reveal any stones or obstructive issues.  Continue with IV fluids   Continue with IV pain control   Clear liquids, advance diet as tolerated

## 2023-04-07 NOTE — ED TRIAGE NOTES
Pt to the ED via EMS with complaints of left upper quadrant abdominal pain x2 days that has gotten a lot worse today. Pt reports associated nausea and vomiting since yesterday. LNBM yesterday, +passing flatus. Pt reports hx of pancreatitis. Last flare up 3 weeks ago, was admitted to the hospital for one week. Pt also reports hx of MS, wheelchair bound. Pt denies chest pain, shortness of breath, diarrhea, fever/chills or any other associated symptoms.

## 2023-04-07 NOTE — SUBJECTIVE & OBJECTIVE
Past Medical History:   Diagnosis Date    Depression     Hypertension     Multiple sclerosis        Past Surgical History:   Procedure Laterality Date     SECTION  2006    DILATION AND CURETTAGE OF UTERUS      LITHOTRIPSY      PORTACATH PLACEMENT Right 02/10/2020       Review of patient's allergies indicates:   Allergen Reactions    Metoclopramide hcl     Tramadol Anaphylaxis       No current facility-administered medications on file prior to encounter.     Current Outpatient Medications on File Prior to Encounter   Medication Sig    buPROPion (WELLBUTRIN XL) 300 MG 24 hr tablet 300 mg.    albuterol (PROVENTIL/VENTOLIN HFA) 90 mcg/actuation inhaler INHALE 2 PUFFS EVERY 6 HOURS AS NEEDED FOR WHEEZING    buprenorphine-naloxone 8-2 mg (SUBOXONE) 8-2 mg Place 1 each under the tongue 2 (two) times a day.    cloNIDine (CATAPRES) 0.1 MG tablet Take one tablet by mouth three times a day as needed for withdraw,  anxiety, insomnia. Wait at least 8 hours between doses    EScitalopram oxalate (LEXAPRO) 10 MG tablet 10 mg.    fluticasone propionate (FLONASE) 50 mcg/actuation nasal spray 1 spray (50 mcg total) by Each Nostril route once daily.    heparin, porcine, PF, (HEPARIN FLUSH 100 UNITS/ML) 100 unit/mL Syrg     ibuprofen (ADVIL,MOTRIN) 800 MG tablet Take 800 mg by mouth 3 (three) times daily.    KESIMPTA PEN 20 mg/0.4 mL PnIj     labetaloL (NORMODYNE) 200 MG tablet Take 200 mg by mouth 2 (two) times daily.    lamoTRIgine (LAMICTAL) 100 MG tablet Take 100 mg by mouth once daily.     lamoTRIgine (LAMICTAL) 100 MG tablet 100 mg.    levothyroxine (SYNTHROID) 25 MCG tablet Take 2 tablets (50 mcg total) by mouth before breakfast.    NARCAN 4 mg/actuation Spry     NORMAL SALINE FLUSH injection     ondansetron (ZOFRAN-ODT) 4 MG TbDL Take 1 tablet (4 mg total) by mouth every 6 (six) hours as needed (Nausea).    QUEtiapine (SEROQUEL) 200 MG Tab Take 200 mg by mouth every evening.    QUEtiapine (SEROQUEL) 400 MG tablet  Take 200 mg by mouth every evening.    sumatriptan (IMITREX) 100 MG tablet Take 100 mg by mouth every 2 (two) hours as needed.     tiZANidine (ZANAFLEX) 4 MG tablet TAKE 1 TABLET BY MOUTH EVERY 8 HOURS    verapamiL (CALAN-SR) 120 MG CR tablet TAKE 1 TABLET BY MOUTH EVERY MORNING BEFORE BREAKFAST     Family History    None       Tobacco Use    Smoking status: Former     Types: Vaping with nicotine    Smokeless tobacco: Never   Substance and Sexual Activity    Alcohol use: Yes     Comment: occassionally    Drug use: Never    Sexual activity: Not on file     Review of Systems   Constitutional:  Negative for chills and fever.   Gastrointestinal:  Positive for abdominal pain.   Objective:     Vital Signs (Most Recent):  Temp: 98.9 °F (37.2 °C) (04/07/23 1536)  Pulse: 91 (04/07/23 1524)  Resp: 18 (04/07/23 1536)  BP: 136/86 (04/07/23 1532)  SpO2: 97 % (04/07/23 1536) Vital Signs (24h Range):  Temp:  [98.9 °F (37.2 °C)] 98.9 °F (37.2 °C)  Pulse:  [] 91  Resp:  [18-22] 18  SpO2:  [95 %-98 %] 97 %  BP: (136-190)/() 136/86     Weight: 69.4 kg (153 lb)  Body mass index is 28.91 kg/m².    Physical Exam  Vitals and nursing note reviewed.   Constitutional:       General: She is not in acute distress.     Appearance: She is obese. She is ill-appearing.   HENT:      Head: Normocephalic.      Mouth/Throat:      Mouth: Mucous membranes are dry.   Eyes:      Conjunctiva/sclera: Conjunctivae normal.   Cardiovascular:      Pulses: Normal pulses.      Heart sounds: Normal heart sounds.   Pulmonary:      Effort: Pulmonary effort is normal. No respiratory distress.   Abdominal:      General: Bowel sounds are normal. There is no distension.      Tenderness: There is abdominal tenderness.   Musculoskeletal:         General: Normal range of motion.   Skin:     General: Skin is warm and dry.   Neurological:      Mental Status: She is alert and oriented to person, place, and time. Mental status is at baseline.   Psychiatric:          Mood and Affect: Mood normal.         Thought Content: Thought content normal.           Significant Labs: All pertinent labs within the past 24 hours have been reviewed.    Significant Imaging: I have reviewed all pertinent imaging results/findings within the past 24 hours.

## 2023-04-08 LAB
ALBUMIN SERPL BCP-MCNC: 3.5 G/DL (ref 3.5–5.2)
ALP SERPL-CCNC: 96 U/L (ref 55–135)
ALT SERPL W/O P-5'-P-CCNC: 58 U/L (ref 10–44)
ANION GAP SERPL CALC-SCNC: 12 MMOL/L (ref 8–16)
AST SERPL-CCNC: 38 U/L (ref 10–40)
BASOPHILS # BLD AUTO: 0.01 K/UL (ref 0–0.2)
BASOPHILS NFR BLD: 0.2 % (ref 0–1.9)
BILIRUB SERPL-MCNC: 0.9 MG/DL (ref 0.1–1)
BUN SERPL-MCNC: 8 MG/DL (ref 6–20)
CALCIUM SERPL-MCNC: 8.7 MG/DL (ref 8.7–10.5)
CHLORIDE SERPL-SCNC: 99 MMOL/L (ref 95–110)
CO2 SERPL-SCNC: 25 MMOL/L (ref 23–29)
CREAT SERPL-MCNC: 0.7 MG/DL (ref 0.5–1.4)
DIFFERENTIAL METHOD: ABNORMAL
EOSINOPHIL # BLD AUTO: 0.2 K/UL (ref 0–0.5)
EOSINOPHIL NFR BLD: 2.9 % (ref 0–8)
ERYTHROCYTE [DISTWIDTH] IN BLOOD BY AUTOMATED COUNT: 11.9 % (ref 11.5–14.5)
EST. GFR  (NO RACE VARIABLE): >60 ML/MIN/1.73 M^2
GLUCOSE SERPL-MCNC: 95 MG/DL (ref 70–110)
HCT VFR BLD AUTO: 31.5 % (ref 37–48.5)
HGB BLD-MCNC: 11 G/DL (ref 12–16)
IMM GRANULOCYTES # BLD AUTO: 0.02 K/UL (ref 0–0.04)
IMM GRANULOCYTES NFR BLD AUTO: 0.3 % (ref 0–0.5)
LYMPHOCYTES # BLD AUTO: 1.2 K/UL (ref 1–4.8)
LYMPHOCYTES NFR BLD: 19.6 % (ref 18–48)
MAGNESIUM SERPL-MCNC: 1.2 MG/DL (ref 1.6–2.6)
MCH RBC QN AUTO: 31.3 PG (ref 27–31)
MCHC RBC AUTO-ENTMCNC: 34.9 G/DL (ref 32–36)
MCV RBC AUTO: 90 FL (ref 82–98)
MONOCYTES # BLD AUTO: 0.4 K/UL (ref 0.3–1)
MONOCYTES NFR BLD: 7.1 % (ref 4–15)
NEUTROPHILS # BLD AUTO: 4.2 K/UL (ref 1.8–7.7)
NEUTROPHILS NFR BLD: 69.9 % (ref 38–73)
NRBC BLD-RTO: 0 /100 WBC
PHOSPHATE SERPL-MCNC: 3.2 MG/DL (ref 2.7–4.5)
PLATELET # BLD AUTO: 175 K/UL (ref 150–450)
PMV BLD AUTO: 9.5 FL (ref 9.2–12.9)
POTASSIUM SERPL-SCNC: 2.9 MMOL/L (ref 3.5–5.1)
PROT SERPL-MCNC: 5.8 G/DL (ref 6–8.4)
RBC # BLD AUTO: 3.51 M/UL (ref 4–5.4)
SODIUM SERPL-SCNC: 136 MMOL/L (ref 136–145)
WBC # BLD AUTO: 5.93 K/UL (ref 3.9–12.7)

## 2023-04-08 PROCEDURE — 80053 COMPREHEN METABOLIC PANEL: CPT | Performed by: STUDENT IN AN ORGANIZED HEALTH CARE EDUCATION/TRAINING PROGRAM

## 2023-04-08 PROCEDURE — 83735 ASSAY OF MAGNESIUM: CPT | Performed by: STUDENT IN AN ORGANIZED HEALTH CARE EDUCATION/TRAINING PROGRAM

## 2023-04-08 PROCEDURE — 94760 N-INVAS EAR/PLS OXIMETRY 1: CPT

## 2023-04-08 PROCEDURE — 84100 ASSAY OF PHOSPHORUS: CPT | Performed by: STUDENT IN AN ORGANIZED HEALTH CARE EDUCATION/TRAINING PROGRAM

## 2023-04-08 PROCEDURE — 25000003 PHARM REV CODE 250

## 2023-04-08 PROCEDURE — 25000003 PHARM REV CODE 250: Performed by: STUDENT IN AN ORGANIZED HEALTH CARE EDUCATION/TRAINING PROGRAM

## 2023-04-08 PROCEDURE — 85025 COMPLETE CBC W/AUTO DIFF WBC: CPT | Performed by: STUDENT IN AN ORGANIZED HEALTH CARE EDUCATION/TRAINING PROGRAM

## 2023-04-08 PROCEDURE — 63600175 PHARM REV CODE 636 W HCPCS: Performed by: STUDENT IN AN ORGANIZED HEALTH CARE EDUCATION/TRAINING PROGRAM

## 2023-04-08 PROCEDURE — 21400001 HC TELEMETRY ROOM

## 2023-04-08 RX ORDER — TIZANIDINE 4 MG/1
4 TABLET ORAL ONCE
Status: COMPLETED | OUTPATIENT
Start: 2023-04-08 | End: 2023-04-08

## 2023-04-08 RX ORDER — POTASSIUM CHLORIDE 20 MEQ/1
40 TABLET, EXTENDED RELEASE ORAL 2 TIMES DAILY
Status: COMPLETED | OUTPATIENT
Start: 2023-04-08 | End: 2023-04-09

## 2023-04-08 RX ORDER — TIZANIDINE 4 MG/1
4 TABLET ORAL EVERY 8 HOURS
Status: DISCONTINUED | OUTPATIENT
Start: 2023-04-08 | End: 2023-04-09 | Stop reason: HOSPADM

## 2023-04-08 RX ORDER — ONDANSETRON 4 MG/1
4 TABLET, FILM COATED ORAL EVERY 6 HOURS PRN
Status: DISCONTINUED | OUTPATIENT
Start: 2023-04-08 | End: 2023-04-09 | Stop reason: HOSPADM

## 2023-04-08 RX ADMIN — TIZANIDINE 4 MG: 4 TABLET ORAL at 08:04

## 2023-04-08 RX ADMIN — LABETALOL HYDROCHLORIDE 200 MG: 100 TABLET, FILM COATED ORAL at 08:04

## 2023-04-08 RX ADMIN — ESCITALOPRAM OXALATE 10 MG: 10 TABLET ORAL at 08:04

## 2023-04-08 RX ADMIN — TIZANIDINE 4 MG: 4 TABLET ORAL at 03:04

## 2023-04-08 RX ADMIN — HYDROMORPHONE HYDROCHLORIDE 1 MG: 1 INJECTION, SOLUTION INTRAMUSCULAR; INTRAVENOUS; SUBCUTANEOUS at 10:04

## 2023-04-08 RX ADMIN — BUPROPION HYDROCHLORIDE 300 MG: 150 TABLET, FILM COATED, EXTENDED RELEASE ORAL at 08:04

## 2023-04-08 RX ADMIN — SODIUM CHLORIDE, POTASSIUM CHLORIDE, SODIUM LACTATE AND CALCIUM CHLORIDE: 600; 310; 30; 20 INJECTION, SOLUTION INTRAVENOUS at 08:04

## 2023-04-08 RX ADMIN — HYDROMORPHONE HYDROCHLORIDE 1 MG: 1 INJECTION, SOLUTION INTRAMUSCULAR; INTRAVENOUS; SUBCUTANEOUS at 12:04

## 2023-04-08 RX ADMIN — HYDROMORPHONE HYDROCHLORIDE 1 MG: 1 INJECTION, SOLUTION INTRAMUSCULAR; INTRAVENOUS; SUBCUTANEOUS at 08:04

## 2023-04-08 RX ADMIN — ONDANSETRON HYDROCHLORIDE 4 MG: 4 TABLET, FILM COATED ORAL at 04:04

## 2023-04-08 RX ADMIN — HYDROMORPHONE HYDROCHLORIDE 1 MG: 1 INJECTION, SOLUTION INTRAMUSCULAR; INTRAVENOUS; SUBCUTANEOUS at 05:04

## 2023-04-08 RX ADMIN — TIZANIDINE 4 MG: 4 TABLET ORAL at 02:04

## 2023-04-08 RX ADMIN — LAMOTRIGINE 50 MG: 25 TABLET ORAL at 08:04

## 2023-04-08 RX ADMIN — HYDROMORPHONE HYDROCHLORIDE 1 MG: 1 INJECTION, SOLUTION INTRAMUSCULAR; INTRAVENOUS; SUBCUTANEOUS at 04:04

## 2023-04-08 RX ADMIN — LEVOTHYROXINE SODIUM 50 MCG: 50 TABLET ORAL at 05:04

## 2023-04-08 RX ADMIN — POTASSIUM CHLORIDE 40 MEQ: 1500 TABLET, EXTENDED RELEASE ORAL at 08:04

## 2023-04-08 RX ADMIN — QUETIAPINE FUMARATE 100 MG: 100 TABLET ORAL at 08:04

## 2023-04-08 RX ADMIN — LAMOTRIGINE 100 MG: 100 TABLET ORAL at 08:04

## 2023-04-08 RX ADMIN — TIZANIDINE 4 MG: 4 TABLET ORAL at 09:04

## 2023-04-08 RX ADMIN — VERAPAMIL HYDROCHLORIDE 120 MG: 120 TABLET, FILM COATED, EXTENDED RELEASE ORAL at 08:04

## 2023-04-08 NOTE — ASSESSMENT & PLAN NOTE
Presents with 3 days of severe abdominal pain after a night drinking alcohol.  Triglycerides are elevated however not to the point where this would be the etiology of pancreatitis.  Imaging did not reveal any stones or obstructive issues.  Continue with IV fluids   Continue with IV pain control   Clear liquids, advance diet to regular today  - if tolerating diet and K improved tomorrow, likely d'c

## 2023-04-08 NOTE — NURSING
Note that patient c/o 10/10 abdominal pain.   BP: elevated: 180/101.   Reports that Tizanidine did not affect her pain.   Hydromorphone 1 mg given at 16:54 in ED and scheduled Q4.   Report given to Night Shift DANN Melendez.    Notified NP Triche of patient's complaints.   One-time order given for IV Ketorolac 30 mg.  NP ordered to hold 2100 buprenorphine-naloxone.     Report given to Charge Michelle and DANN Melendez.

## 2023-04-08 NOTE — HOSPITAL COURSE
43 yo F with PMHx of HTN, chronic pain on suboxone, Multiple Sclerosis admitted with acute pancreatitis. Started on IVF, pain control, clear liquids. Likely secondary to alcohol use. Pain improving. Electrolytes replaced. Patient's diet was advanced and she tolerated it without any abdominal pain, nausea or vomiting. Overnight on 04/08, patient had what appears to be one of her bipolar episodes where she was extremely paranoid and thought staff was out to get her. Episode improved when patient arrived at bedside and spent the night. On morning of discharge, patient was calm, cooperative, and without any visual or auditory hallucinations.  at bedside states patietn is at her baseline.      Patient admits feeling significantly better.  No longer having any abdominal pain.  Has tolerated her diet without any difficulty. Pt denies any fever, headaches, vision changes, chest pain, shortness of breath, palpitations, abdominal pain, nausea, vomiting, or any new weaknesses. Feels ready to go home. Patient's exam on discharge was as follow: Patient is alert and oriented, appears in no acute distress, heart with regular rate and rhythm, lungs clear to asculation with non-labored breathing, abdomen soft and nontender, and no new weaknesses or focal deficits seen. Bilateral lower extremities without any edema or calf tenderness.     Patient was counseled regarding any abnormal labs, differential diagnosis, treatment options, risk-benefit, lifestyle changes, prognosis, current condition, and medications. Patient was interactive and attentive.  Patient's questions were answered in a respectful and timely manner. Patient was instructed to follow-up with PCP within 1 week and to continue taking medications as prescribed.  Also, extensively discussed the risks, benefits, and side effects of patient's medications. Discussed with patient about any medication changes. Patient verbalized understanding and agrees to treatment  plan.  Patient is stable for discharge.  Patient has no other questions or concerns at this time.  ED precautions discussed with the patient.    Vital signs are stable. Ambulating without any difficulty. Tolerating p.o. intake without any nausea or vomiting. Afebrile for over 24 hours. Patient is in stable condition and has no questions or concerns. Patient will be discharge to home. Prescriptions sent to pharmacy.  CM/SW to assist with discharge planning.     Vitals:    04/09/23 0400 04/09/23 0745 04/09/23 0820 04/09/23 1149   BP: (!) 180/106 (!) 144/85 (!) 181/95 (!) 138/93   BP Location: Left arm Left arm Left arm Left arm   Patient Position: Lying Lying Lying Sitting   Pulse:  85 72 91   Resp: 18 18 18 16   Temp:  98.1 °F (36.7 °C) 98.4 °F (36.9 °C) 98.3 °F (36.8 °C)   TempSrc:  Oral Oral Oral   SpO2: 97% 97% 96% 95%   Weight:       Height:

## 2023-04-08 NOTE — SUBJECTIVE & OBJECTIVE
Interval History: no new issues, she is feeling better and having less pain. Would like to advance diet    Review of Systems  Objective:     Vital Signs (Most Recent):  Temp: 97.9 °F (36.6 °C) (04/08/23 0741)  Pulse: 65 (04/08/23 0741)  Resp: 18 (04/08/23 1033)  BP: (!) 174/77 (04/08/23 0741)  SpO2: 96 % (04/08/23 0918)   Vital Signs (24h Range):  Temp:  [97.5 °F (36.4 °C)-98.9 °F (37.2 °C)] 97.9 °F (36.6 °C)  Pulse:  [] 65  Resp:  [18-22] 18  SpO2:  [95 %-98 %] 96 %  BP: (134-190)/() 174/77     Weight: 72 kg (158 lb 11.7 oz)  Body mass index is 29.99 kg/m².    Intake/Output Summary (Last 24 hours) at 4/8/2023 1044  Last data filed at 4/8/2023 0800  Gross per 24 hour   Intake 170 ml   Output --   Net 170 ml      Physical Exam  Vitals and nursing note reviewed.   Constitutional:       General: She is not in acute distress.     Appearance: She is obese. She is ill-appearing.   HENT:      Head: Normocephalic.      Mouth/Throat:      Mouth: Mucous membranes are dry.   Eyes:      Conjunctiva/sclera: Conjunctivae normal.   Cardiovascular:      Pulses: Normal pulses.      Heart sounds: Normal heart sounds.   Pulmonary:      Effort: Pulmonary effort is normal. No respiratory distress.   Abdominal:      General: Bowel sounds are normal. There is no distension.   Musculoskeletal:         General: Normal range of motion.   Skin:     General: Skin is warm and dry.   Neurological:      Mental Status: She is alert and oriented to person, place, and time. Mental status is at baseline.   Psychiatric:         Mood and Affect: Mood normal.         Thought Content: Thought content normal.       Significant Labs: All pertinent labs within the past 24 hours have been reviewed.    Significant Imaging: I have reviewed all pertinent imaging results/findings within the past 24 hours.

## 2023-04-08 NOTE — NURSING
Received report from DANN Melendez. Patient lying in bed resting, NAD noted. Safety Precautions maintained, Will Monitor.    53

## 2023-04-08 NOTE — NURSING
Reported off to oncoming nurse, patient resting in bed, aaox3. Patient can make needs known to staff, max assist required for adls and transfers, no acute distress noted, safety precautions maintained.      Chart check completed.

## 2023-04-08 NOTE — NURSING
[facility], [facility]  Nurses Note -- 4 Eyes      4/8/2023       Skin assessed on: Q Shift      [] No Pressure Injuries Present    []Prevention Measures Documented    [] Yes LDA  for Pressure Injury Previously documented     [] Yes New Pressure Injury Discovered   [] LDA for New Pressure Injury Added      Attending RN:  Martha Barker RN     Second RN: Nora, RN     [facility], [place]  Nurses Note -- 4 Eyes      4/8/2023       Skin assessed on: Q Shift      [x] No Pressure Injuries Present    []Prevention Measures Documented    [] Yes LDA  for Pressure Injury Previously documented     [] Yes New Pressure Injury Discovered   [] LDA for New Pressure Injury Added      Attending RN:  Martha aBrker RN     Second RN:  Nora RN

## 2023-04-08 NOTE — PROGRESS NOTES
[facility] Medicine  Progress Note    Patient Name: Janis Reese  MRN: [id number]  Patient Class: IP- Inpatient   Admission Date: 4/7/2023  Length of Stay: 1 days  Attending Physician: Fam Reese MD  Primary Care Provider: Rula Betancourt MD        Subjective:     Principal Problem:Acute pancreatitis        HPI:  Mrs. Reese is a 43 yo F with PMHx of HTN, chronic pain on suboxone, Multiple Sclerosis who presents to the ED for evaluation of abdominal pain.  Patient states the pain started about 3 days ago and has continually gotten worse.  Patient states she was diagnosed with pancreatitis about 4 months ago but was never told why she had this.  After discussing further with patient and her , they had just went out to dinner and had multiple alcoholic drinks the night prior to her abdominal pain starting.  Denies nausea, vomiting, diarrhea.  No fevers or chills.  In the emergency department the patient was found to have an elevated lipase at 2:35 and imaging consistent with pancreatitis.  She was given IV fluids and IV pain medications.  She will be admitted to hospital medicine for further management.        Overview/Hospital Course:  Admitted with acute pancreatitis. Started on IVF, pain control, clear liquids. Likely secondary to alcohol use. Pain improving. K low, replacing.       Interval History: no new issues, she is feeling better and having less pain. Would like to advance diet    Review of Systems  Objective:     Vital Signs (Most Recent):  Temp: 97.9 °F (36.6 °C) (04/08/23 0741)  Pulse: 65 (04/08/23 0741)  Resp: 18 (04/08/23 1033)  BP: (!) 174/77 (04/08/23 0741)  SpO2: 96 % (04/08/23 0918)   Vital Signs (24h Range):  Temp:  [97.5 °F (36.4 °C)-98.9 °F (37.2 °C)] 97.9 °F (36.6 °C)  Pulse:  [] 65  Resp:  [18-22] 18  SpO2:  [95 %-98 %] 96 %  BP: (134-190)/() 174/77     Weight: 72 kg (158 lb 11.7 oz)  Body mass index is 29.99 kg/m².    Intake/Output Summary  (Last 24 hours) at 4/8/2023 1044  Last data filed at 4/8/2023 0800  Gross per 24 hour   Intake 170 ml   Output --   Net 170 ml      Physical Exam  Vitals and nursing note reviewed.   Constitutional:       General: She is not in acute distress.     Appearance: She is obese. She is ill-appearing.   HENT:      Head: Normocephalic.      Mouth/Throat:      Mouth: Mucous membranes are dry.   Eyes:      Conjunctiva/sclera: Conjunctivae normal.   Cardiovascular:      Pulses: Normal pulses.      Heart sounds: Normal heart sounds.   Pulmonary:      Effort: Pulmonary effort is normal. No respiratory distress.   Abdominal:      General: Bowel sounds are normal. There is no distension.   Musculoskeletal:         General: Normal range of motion.   Skin:     General: Skin is warm and dry.   Neurological:      Mental Status: She is alert and oriented to person, place, and time. Mental status is at baseline.   Psychiatric:         Mood and Affect: Mood normal.         Thought Content: Thought content normal.       Significant Labs: All pertinent labs within the past 24 hours have been reviewed.    Significant Imaging: I have reviewed all pertinent imaging results/findings within the past 24 hours.      Assessment/Plan:      * Acute pancreatitis  Presents with 3 days of severe abdominal pain after a night drinking alcohol.  Triglycerides are elevated however not to the point where this would be the etiology of pancreatitis.  Imaging did not reveal any stones or obstructive issues.  Continue with IV fluids   Continue with IV pain control   Clear liquids, advance diet to regular today  - if tolerating diet and K improved tomorrow, likely d'c      Bipolar 1 disorder  Resume home medications      Essential hypertension  Resume home medications      Multiple sclerosis  Receives monthly injections  - No acute issues at this time  - at baseline        VTE Risk Mitigation (From admission, onward)         Ordered     IP VTE LOW RISK PATIENT   Once         04/07/23 1501     Place sequential compression device  Until discontinued         04/07/23 1501                Discharge Planning   ROSA:      Code Status: Full Code   Is the patient medically ready for discharge?:     Reason for patient still in hospital (select all that apply): Treatment                     Fam Reese MD  Department of [facility] Medicine   [facility]

## 2023-04-08 NOTE — NURSING
[facility], [facility]  Nurses Note -- 4 Eyes      4/7/2023       Skin assessed on: Q Shift      [x] No Pressure Injuries Present    [x]Prevention Measures Documented    [] Yes LDA  for Pressure Injury Previously documented     [] Yes New Pressure Injury Discovered   [] LDA for New Pressure Injury Added      Attending RN:  Nora Robison RN     Second RN:  Delfina Osorio RN

## 2023-04-09 VITALS
OXYGEN SATURATION: 95 % | TEMPERATURE: 98 F | HEIGHT: 61 IN | SYSTOLIC BLOOD PRESSURE: 138 MMHG | BODY MASS INDEX: 29.97 KG/M2 | HEART RATE: 91 BPM | DIASTOLIC BLOOD PRESSURE: 93 MMHG | RESPIRATION RATE: 16 BRPM | WEIGHT: 158.75 LBS

## 2023-04-09 LAB
ALBUMIN SERPL BCP-MCNC: 3.7 G/DL (ref 3.5–5.2)
ALP SERPL-CCNC: 106 U/L (ref 55–135)
ALT SERPL W/O P-5'-P-CCNC: 51 U/L (ref 10–44)
ANION GAP SERPL CALC-SCNC: 9 MMOL/L (ref 8–16)
AST SERPL-CCNC: 30 U/L (ref 10–40)
BASOPHILS # BLD AUTO: 0.02 K/UL (ref 0–0.2)
BASOPHILS NFR BLD: 0.2 % (ref 0–1.9)
BILIRUB SERPL-MCNC: 0.7 MG/DL (ref 0.1–1)
BUN SERPL-MCNC: 5 MG/DL (ref 6–20)
CALCIUM SERPL-MCNC: 9.1 MG/DL (ref 8.7–10.5)
CHLORIDE SERPL-SCNC: 101 MMOL/L (ref 95–110)
CO2 SERPL-SCNC: 26 MMOL/L (ref 23–29)
CREAT SERPL-MCNC: 0.7 MG/DL (ref 0.5–1.4)
DIFFERENTIAL METHOD: ABNORMAL
EOSINOPHIL # BLD AUTO: 0.2 K/UL (ref 0–0.5)
EOSINOPHIL NFR BLD: 2.3 % (ref 0–8)
ERYTHROCYTE [DISTWIDTH] IN BLOOD BY AUTOMATED COUNT: 11.9 % (ref 11.5–14.5)
EST. GFR  (NO RACE VARIABLE): >60 ML/MIN/1.73 M^2
GLUCOSE SERPL-MCNC: 110 MG/DL (ref 70–110)
HCT VFR BLD AUTO: 34.6 % (ref 37–48.5)
HGB BLD-MCNC: 12.1 G/DL (ref 12–16)
IMM GRANULOCYTES # BLD AUTO: 0.03 K/UL (ref 0–0.04)
IMM GRANULOCYTES NFR BLD AUTO: 0.4 % (ref 0–0.5)
LYMPHOCYTES # BLD AUTO: 1.1 K/UL (ref 1–4.8)
LYMPHOCYTES NFR BLD: 13.2 % (ref 18–48)
MAGNESIUM SERPL-MCNC: 1.3 MG/DL (ref 1.6–2.6)
MAGNESIUM SERPL-MCNC: 1.9 MG/DL (ref 1.6–2.6)
MCH RBC QN AUTO: 31.4 PG (ref 27–31)
MCHC RBC AUTO-ENTMCNC: 35 G/DL (ref 32–36)
MCV RBC AUTO: 90 FL (ref 82–98)
MONOCYTES # BLD AUTO: 0.5 K/UL (ref 0.3–1)
MONOCYTES NFR BLD: 5.7 % (ref 4–15)
NEUTROPHILS # BLD AUTO: 6.5 K/UL (ref 1.8–7.7)
NEUTROPHILS NFR BLD: 78.2 % (ref 38–73)
NRBC BLD-RTO: 0 /100 WBC
PHOSPHATE SERPL-MCNC: 2.8 MG/DL (ref 2.7–4.5)
PLATELET # BLD AUTO: 166 K/UL (ref 150–450)
PMV BLD AUTO: 9.7 FL (ref 9.2–12.9)
POTASSIUM SERPL-SCNC: 3.7 MMOL/L (ref 3.5–5.1)
PROT SERPL-MCNC: 6.5 G/DL (ref 6–8.4)
RBC # BLD AUTO: 3.85 M/UL (ref 4–5.4)
SODIUM SERPL-SCNC: 136 MMOL/L (ref 136–145)
WBC # BLD AUTO: 8.35 K/UL (ref 3.9–12.7)

## 2023-04-09 PROCEDURE — 25000003 PHARM REV CODE 250: Performed by: STUDENT IN AN ORGANIZED HEALTH CARE EDUCATION/TRAINING PROGRAM

## 2023-04-09 PROCEDURE — 84100 ASSAY OF PHOSPHORUS: CPT | Performed by: STUDENT IN AN ORGANIZED HEALTH CARE EDUCATION/TRAINING PROGRAM

## 2023-04-09 PROCEDURE — 80053 COMPREHEN METABOLIC PANEL: CPT | Performed by: STUDENT IN AN ORGANIZED HEALTH CARE EDUCATION/TRAINING PROGRAM

## 2023-04-09 PROCEDURE — 85025 COMPLETE CBC W/AUTO DIFF WBC: CPT | Performed by: STUDENT IN AN ORGANIZED HEALTH CARE EDUCATION/TRAINING PROGRAM

## 2023-04-09 PROCEDURE — 63600175 PHARM REV CODE 636 W HCPCS: Performed by: STUDENT IN AN ORGANIZED HEALTH CARE EDUCATION/TRAINING PROGRAM

## 2023-04-09 PROCEDURE — 25000003 PHARM REV CODE 250

## 2023-04-09 PROCEDURE — 36415 COLL VENOUS BLD VENIPUNCTURE: CPT | Performed by: STUDENT IN AN ORGANIZED HEALTH CARE EDUCATION/TRAINING PROGRAM

## 2023-04-09 PROCEDURE — 99900035 HC TECH TIME PER 15 MIN (STAT)

## 2023-04-09 PROCEDURE — 83735 ASSAY OF MAGNESIUM: CPT | Performed by: STUDENT IN AN ORGANIZED HEALTH CARE EDUCATION/TRAINING PROGRAM

## 2023-04-09 PROCEDURE — 83735 ASSAY OF MAGNESIUM: CPT | Mod: 91 | Performed by: STUDENT IN AN ORGANIZED HEALTH CARE EDUCATION/TRAINING PROGRAM

## 2023-04-09 RX ORDER — HEPARIN SODIUM 1000 [USP'U]/ML
5000 INJECTION, SOLUTION INTRAVENOUS; SUBCUTANEOUS ONCE
Status: DISCONTINUED | OUTPATIENT
Start: 2023-04-09 | End: 2023-04-09

## 2023-04-09 RX ORDER — HYDRALAZINE HYDROCHLORIDE 20 MG/ML
10 INJECTION INTRAMUSCULAR; INTRAVENOUS EVERY 4 HOURS PRN
Status: DISCONTINUED | OUTPATIENT
Start: 2023-04-09 | End: 2023-04-09 | Stop reason: HOSPADM

## 2023-04-09 RX ORDER — HALOPERIDOL 5 MG/ML
5 INJECTION INTRAMUSCULAR ONCE
Status: COMPLETED | OUTPATIENT
Start: 2023-04-09 | End: 2023-04-09

## 2023-04-09 RX ORDER — MAGNESIUM SULFATE HEPTAHYDRATE 40 MG/ML
2 INJECTION, SOLUTION INTRAVENOUS ONCE
Status: COMPLETED | OUTPATIENT
Start: 2023-04-09 | End: 2023-04-09

## 2023-04-09 RX ORDER — HEPARIN 100 UNIT/ML
5 SYRINGE INTRAVENOUS ONCE
Status: COMPLETED | OUTPATIENT
Start: 2023-04-09 | End: 2023-04-09

## 2023-04-09 RX ORDER — QUETIAPINE FUMARATE 100 MG/1
100 TABLET, FILM COATED ORAL NIGHTLY
Start: 2023-04-09

## 2023-04-09 RX ADMIN — BUPROPION HYDROCHLORIDE 300 MG: 150 TABLET, FILM COATED, EXTENDED RELEASE ORAL at 09:04

## 2023-04-09 RX ADMIN — VERAPAMIL HYDROCHLORIDE 120 MG: 120 TABLET, FILM COATED, EXTENDED RELEASE ORAL at 09:04

## 2023-04-09 RX ADMIN — ESCITALOPRAM OXALATE 10 MG: 10 TABLET ORAL at 09:04

## 2023-04-09 RX ADMIN — MAGNESIUM SULFATE HEPTAHYDRATE 2 G: 40 INJECTION, SOLUTION INTRAVENOUS at 09:04

## 2023-04-09 RX ADMIN — TIZANIDINE 4 MG: 4 TABLET ORAL at 05:04

## 2023-04-09 RX ADMIN — LABETALOL HYDROCHLORIDE 200 MG: 100 TABLET, FILM COATED ORAL at 09:04

## 2023-04-09 RX ADMIN — SODIUM CHLORIDE, POTASSIUM CHLORIDE, SODIUM LACTATE AND CALCIUM CHLORIDE: 600; 310; 30; 20 INJECTION, SOLUTION INTRAVENOUS at 05:04

## 2023-04-09 RX ADMIN — HALOPERIDOL LACTATE 5 MG: 5 INJECTION, SOLUTION INTRAMUSCULAR at 05:04

## 2023-04-09 RX ADMIN — POTASSIUM CHLORIDE 40 MEQ: 1500 TABLET, EXTENDED RELEASE ORAL at 09:04

## 2023-04-09 RX ADMIN — TIZANIDINE 4 MG: 4 TABLET ORAL at 03:04

## 2023-04-09 RX ADMIN — LAMOTRIGINE 100 MG: 100 TABLET ORAL at 09:04

## 2023-04-09 RX ADMIN — LEVOTHYROXINE SODIUM 50 MCG: 50 TABLET ORAL at 05:04

## 2023-04-09 RX ADMIN — HEPARIN 500 UNITS: 100 SYRINGE at 04:04

## 2023-04-09 NOTE — NURSING
Pt experiences AMS during the shift. HTN, hospitalist notified. AAOx1 R/T AMS and initially denies pain, 0/10 Pain Scale. Pt compliant with all TX modalities without incidence and remains free from falls/injuries R/T the adherence of all safety protocols.

## 2023-04-09 NOTE — PLAN OF CARE
04/09/23 1019   Post-Acute Status   Post-Acute Authorization Other  (Home; follow-ups)   Coverage Medicaid   Other Status No Post-Acute Service Needs   Hospital Resources/Appts/Education Provided Provided patient/caregiver with written discharge plan information;Appointments scheduled and added to AVS;Appointments scheduled by Navigator/Coordinator;Provided education on problems/symptoms using teachback   Discharge Delays None known at this time   Discharge Plan   Discharge Plan A Home with family  (Follow-ups)   Discharge Plan B Home with family  (Follow-ups)

## 2023-04-09 NOTE — PLAN OF CARE
[facility] [place] - Telemetry  Initial Discharge Assessment    Assist needed with MS; spouse and family assist; no current medical services; uses WC, RW, BSC; no discharge needs reported.     Primary Care Provider: Rula Betancourt MD    Admission Diagnosis: Acute pancreatitis [K85.90]  LUQ abdominal pain [R10.12]  Chest pain [R07.9]    Admission Date: 4/7/2023  Expected Discharge Date:     Discharge Barriers Identified: None    Payor: MEDICAID / Plan: formerly [facility] CONNECT / Product Type: Managed Medicaid /     Extended Emergency Contact Information  Primary Emergency Contact: Lane Reese  Address: [address] [place] [address] [facility]  Home Phone: [phone]  Mobile Phone: [phone]  Relation: Spouse  Secondary Emergency Contact: Trae Son  Address: [address] [place] [address] [place]  Mobile Phone: [phone]  Relation: Sister    Discharge Plan A: Home with family (Follow-up)  Discharge Plan B: Other (TBD)      [place] [id number] [facility] [address] [address]  Phone: [phone] Fax: [phone]      Initial Assessment (most recent)       Adult Discharge Assessment - 04/08/23 1510          Discharge Assessment    Assessment Type Discharge Planning Assessment     Confirmed/corrected address, phone number and insurance Yes     Confirmed Demographics Correct on Facesheet     Source of Information patient;health record     Communicated ROSA with patient/caregiver Date not available/Unable to determine     Reason For Admission Acute pancreatitis     People in Home other relative(s);sibling(s)     Facility Arrived From: Home     Do you expect to return to your current living situation? Yes     Do you have help at home or someone to help you manage your care at home? Yes     Who are your caregiver(s) and their phone number(s)? Lane-spouse: [phone]; Trae-sister:  [phone]     Prior to hospitilization cognitive status: Alert/Oriented     Current cognitive status: Alert/Oriented     Walking or Climbing Stairs ambulation difficulty, requires equipment     Mobility Management WC; RW     Dressing/Bathing bathing difficulty, requires equipment     Dressing/Bathing Management BSC     Do you have any problems with: Errands/Grocery;Needs other help     Specify other help Spouse and family assists as needed; transports     Home Accessibility wheelchair accessible     Home Layout Able to live on 1st floor     Equipment Currently Used at Home walker, rolling;wheelchair;bedside commode     Readmission within 30 days? No     Patient currently being followed by outpatient case management? No     Do you currently have service(s) that help you manage your care at home? No     Do you take prescription medications? Yes     Do you have prescription coverage? Yes     Coverage Medicaid     Do you have any problems affording any of your prescribed medications? No     Is the patient taking medications as prescribed? yes     Who is going to help you get home at discharge? Lane-spouse; Trae-sister     How do you get to doctors appointments? family or friend will provide     Are you on dialysis? No     Do you take coumadin? No     Discharge Plan A Home with family   Follow-up    Discharge Plan B Other   TBD    DME Needed Upon Discharge  other (see comments)   TBD    Discharge Plan discussed with: Patient     Discharge Barriers Identified None                 SW Role explained to patient; two patient identifiers recognized; SW contact information placed on Communication board. Discussed patient managing health care at home and discussed discharge plans A and B; determined who would be helping patient at home with recovery: Lane, spouse, and family will help with recovery at home.

## 2023-04-09 NOTE — PLAN OF CARE
Problem: Adult Inpatient Plan of Care  Goal: Plan of Care Review  Outcome: Met  Goal: Patient-Specific Goal (Individualized)  Outcome: Met  Goal: Absence of Hospital-Acquired Illness or Injury  Outcome: Met  Goal: Optimal Comfort and Wellbeing  Outcome: Met  Goal: Readiness for Transition of Care  Outcome: Met     Problem: Fall Injury Risk  Goal: Absence of Fall and Fall-Related Injury  Outcome: Met     Problem: Infection  Goal: Absence of Infection Signs and Symptoms  Outcome: Met     Problem: Skin Injury Risk Increased  Goal: Skin Health and Integrity  Outcome: Met

## 2023-04-09 NOTE — NURSING
Reviewed all discharge instructions with patient, new medications, continued medications, follow-up appointments and signs/symptoms to report to PCP or seek emergency medical care. Patient verbalized understanding to all instructions and education. Patient denies any complaints or concerns at this time. Patient was wheeled off unit to car for discharge.

## 2023-04-09 NOTE — NURSING
[facility]  Nurses Note -- 4 Eyes      4/8/2023       Skin assessed on: Q Shift      [x] No Pressure Injuries Present    [x]Prevention Measures Documented    [] Yes LDA  for Pressure Injury Previously documented     [] Yes New Pressure Injury Discovered   [] LDA for New Pressure Injury Added      Attending RN:  Afua Hurtado RN     Second RN:  Martha Barker RN

## 2023-04-09 NOTE — PROGRESS NOTES
Patient is ready and clear for discharge from Case Management's perspective; informed patient's nurse, Martha. Discussed and provide patient with written discharge instruction and education.

## 2023-04-09 NOTE — PLAN OF CARE
Problem: Adult Inpatient Plan of Care  Goal: Plan of Care Review  4/9/2023 0808 by Afua Hurtado RN  Outcome: Ongoing, Progressing  4/8/2023 1910 by Afua Hurtado RN  Outcome: Ongoing, Progressing  Goal: Patient-Specific Goal (Individualized)  4/9/2023 0808 by Afua Hurtado RN  Outcome: Ongoing, Progressing  4/8/2023 1910 by Afua Hurtado RN  Outcome: Ongoing, Progressing  Goal: Absence of Hospital-Acquired Illness or Injury  4/9/2023 0808 by Afua Hurtado RN  Outcome: Ongoing, Progressing  4/8/2023 1910 by Afua Hurtado RN  Outcome: Ongoing, Progressing  Goal: Optimal Comfort and Wellbeing  4/9/2023 0808 by Afua Hurtado RN  Outcome: Ongoing, Progressing  4/8/2023 1910 by Afua Hurtado RN  Outcome: Ongoing, Progressing  Goal: Readiness for Transition of Care  4/9/2023 0808 by Afua Hurtado RN  Outcome: Ongoing, Progressing  4/8/2023 1910 by Afua Hurtado RN  Outcome: Ongoing, Progressing     Problem: Fall Injury Risk  Goal: Absence of Fall and Fall-Related Injury  4/9/2023 0808 by Afua Hurtado RN  Outcome: Ongoing, Progressing  4/8/2023 1910 by Afua Hurtado RN  Outcome: Ongoing, Progressing     Problem: Infection  Goal: Absence of Infection Signs and Symptoms  4/9/2023 0808 by Afua Hurtado RN  Outcome: Ongoing, Progressing  4/8/2023 1910 by Afua Hurtado RN  Outcome: Ongoing, Progressing     Problem: Skin Injury Risk Increased  Goal: Skin Health and Integrity  4/9/2023 0808 by Afua Hurtado RN  Outcome: Ongoing, Progressing  4/8/2023 1910 by Afua Hurtado RN  Outcome: Ongoing, Progressing

## 2023-04-09 NOTE — PLAN OF CARE
[place] - Telemetry  Discharge Final Note    Primary Care Provider: Rula Betancourt MD    Expected Discharge Date: 4/9/2023    Final Discharge Note (most recent)       Final Note - 04/09/23 1020          Final Note    Assessment Type Final Discharge Note     Anticipated Discharge Disposition Home or Self Care     What phone number can be called within the next 1-3 days to see how you are doing after discharge? --   [phone]    Hospital Resources/Appts/Education Provided Provided patient/caregiver with written discharge plan information;Appointments scheduled by Navigator/Coordinator;Provided education on problems/symptoms using teachback;Appointments scheduled and added to AVS        Post-Acute Status    Post-Acute Authorization Other   Home; follow-up    Coverage Medicaid     Other Status No Post-Acute Service Needs     Discharge Delays None known at this time                     Important Message from Medicare             Contact Info       Rula Betancourt MD   Specialty: Internal Medicine   Relationship: PCP - General    [address]  [facility]  [address] [address]   Phone: [phone]       Next Steps: Follow up in 1 week(s)

## 2023-05-15 ENCOUNTER — HOSPITAL ENCOUNTER (INPATIENT)
Facility: HOSPITAL | Age: 43
LOS: 4 days | Discharge: HOME | DRG: 440 | End: 2023-05-19
Attending: EMERGENCY MEDICINE | Admitting: HOSPITALIST
Payer: MEDICAID

## 2023-05-15 DIAGNOSIS — K85.90 PANCREATITIS: ICD-10-CM

## 2023-05-15 DIAGNOSIS — R00.0 TACHYCARDIA: ICD-10-CM

## 2023-05-15 DIAGNOSIS — R10.9 ABDOMINAL PAIN: ICD-10-CM

## 2023-05-15 DIAGNOSIS — R07.9 CHEST PAIN: ICD-10-CM

## 2023-05-15 PROBLEM — E83.52 HYPERCALCEMIA: Status: ACTIVE | Noted: 2023-05-15

## 2023-05-15 PROBLEM — D72.829 LEUKOCYTOSIS: Status: ACTIVE | Noted: 2023-05-15

## 2023-05-15 PROBLEM — G89.29 CHRONIC PAIN: Chronic | Status: ACTIVE | Noted: 2023-05-15

## 2023-05-15 PROBLEM — F10.10 ALCOHOL ABUSE: Chronic | Status: ACTIVE | Noted: 2023-05-15

## 2023-05-15 LAB
ALBUMIN SERPL BCP-MCNC: 4.2 G/DL (ref 3.5–5.2)
ALP SERPL-CCNC: 88 U/L (ref 55–135)
ALT SERPL W/O P-5'-P-CCNC: 54 U/L (ref 10–44)
ANION GAP SERPL CALC-SCNC: 17 MMOL/L (ref 8–16)
AST SERPL-CCNC: 31 U/L (ref 10–40)
BASOPHILS # BLD AUTO: 0.03 K/UL (ref 0–0.2)
BASOPHILS NFR BLD: 0.2 % (ref 0–1.9)
BILIRUB SERPL-MCNC: 0.4 MG/DL (ref 0.1–1)
BILIRUB UR QL STRIP: NEGATIVE
BUN SERPL-MCNC: 11 MG/DL (ref 6–20)
CA-I BLDV-SCNC: 1.6 MMOL/L (ref 1.06–1.42)
CALCIUM SERPL-MCNC: 13.4 MG/DL (ref 8.7–10.5)
CHLORIDE SERPL-SCNC: 102 MMOL/L (ref 95–110)
CLARITY UR: CLEAR
CO2 SERPL-SCNC: 23 MMOL/L (ref 23–29)
COLOR UR: YELLOW
CREAT SERPL-MCNC: 1 MG/DL (ref 0.5–1.4)
DIFFERENTIAL METHOD: ABNORMAL
EOSINOPHIL # BLD AUTO: 0.1 K/UL (ref 0–0.5)
EOSINOPHIL NFR BLD: 0.3 % (ref 0–8)
ERYTHROCYTE [DISTWIDTH] IN BLOOD BY AUTOMATED COUNT: 11.9 % (ref 11.5–14.5)
EST. GFR  (NO RACE VARIABLE): >60 ML/MIN/1.73 M^2
GLUCOSE SERPL-MCNC: 196 MG/DL (ref 70–110)
GLUCOSE UR QL STRIP: NEGATIVE
HCT VFR BLD AUTO: 45.4 % (ref 37–48.5)
HGB BLD-MCNC: 15.8 G/DL (ref 12–16)
HGB UR QL STRIP: NEGATIVE
IMM GRANULOCYTES # BLD AUTO: 0.06 K/UL (ref 0–0.04)
IMM GRANULOCYTES NFR BLD AUTO: 0.4 % (ref 0–0.5)
KETONES UR QL STRIP: ABNORMAL
LEUKOCYTE ESTERASE UR QL STRIP: NEGATIVE
LIPASE SERPL-CCNC: 765 U/L (ref 4–60)
LYMPHOCYTES # BLD AUTO: 2.1 K/UL (ref 1–4.8)
LYMPHOCYTES NFR BLD: 12.7 % (ref 18–48)
MAGNESIUM SERPL-MCNC: 1.2 MG/DL (ref 1.6–2.6)
MAGNESIUM SERPL-MCNC: 1.4 MG/DL (ref 1.6–2.6)
MCH RBC QN AUTO: 30.7 PG (ref 27–31)
MCHC RBC AUTO-ENTMCNC: 34.8 G/DL (ref 32–36)
MCV RBC AUTO: 88 FL (ref 82–98)
MONOCYTES # BLD AUTO: 0.9 K/UL (ref 0.3–1)
MONOCYTES NFR BLD: 5.4 % (ref 4–15)
NEUTROPHILS # BLD AUTO: 13.6 K/UL (ref 1.8–7.7)
NEUTROPHILS NFR BLD: 81 % (ref 38–73)
NITRITE UR QL STRIP: NEGATIVE
NRBC BLD-RTO: 0 /100 WBC
PH UR STRIP: 6 [PH] (ref 5–8)
PHOSPHATE SERPL-MCNC: 3.7 MG/DL (ref 2.7–4.5)
PLATELET # BLD AUTO: 414 K/UL (ref 150–450)
PMV BLD AUTO: 10.4 FL (ref 9.2–12.9)
POTASSIUM SERPL-SCNC: 3.6 MMOL/L (ref 3.5–5.1)
PROT SERPL-MCNC: 7.2 G/DL (ref 6–8.4)
PROT UR QL STRIP: NEGATIVE
PTH-INTACT SERPL-MCNC: 8.6 PG/ML (ref 9–77)
RBC # BLD AUTO: 5.15 M/UL (ref 4–5.4)
SODIUM SERPL-SCNC: 142 MMOL/L (ref 136–145)
SP GR UR STRIP: 1.01 (ref 1–1.03)
URN SPEC COLLECT METH UR: ABNORMAL
UROBILINOGEN UR STRIP-ACNC: NEGATIVE EU/DL
WBC # BLD AUTO: 16.74 K/UL (ref 3.9–12.7)

## 2023-05-15 PROCEDURE — 83690 ASSAY OF LIPASE: CPT | Performed by: PHYSICIAN ASSISTANT

## 2023-05-15 PROCEDURE — 11000001 HC ACUTE MED/SURG PRIVATE ROOM

## 2023-05-15 PROCEDURE — 63600175 PHARM REV CODE 636 W HCPCS: Performed by: HOSPITALIST

## 2023-05-15 PROCEDURE — 63600175 PHARM REV CODE 636 W HCPCS: Performed by: EMERGENCY MEDICINE

## 2023-05-15 PROCEDURE — 85025 COMPLETE CBC W/AUTO DIFF WBC: CPT | Performed by: PHYSICIAN ASSISTANT

## 2023-05-15 PROCEDURE — 84100 ASSAY OF PHOSPHORUS: CPT | Performed by: PHYSICIAN ASSISTANT

## 2023-05-15 PROCEDURE — 25500020 PHARM REV CODE 255: Performed by: HOSPITALIST

## 2023-05-15 PROCEDURE — S5010 5% DEXTROSE AND 0.45% SALINE: HCPCS | Performed by: HOSPITALIST

## 2023-05-15 PROCEDURE — 96374 THER/PROPH/DIAG INJ IV PUSH: CPT

## 2023-05-15 PROCEDURE — 25000003 PHARM REV CODE 250: Performed by: HOSPITALIST

## 2023-05-15 PROCEDURE — 93010 EKG 12-LEAD: ICD-10-PCS | Mod: ,,, | Performed by: INTERNAL MEDICINE

## 2023-05-15 PROCEDURE — 81003 URINALYSIS AUTO W/O SCOPE: CPT | Performed by: PHYSICIAN ASSISTANT

## 2023-05-15 PROCEDURE — 99285 EMERGENCY DEPT VISIT HI MDM: CPT | Mod: 25

## 2023-05-15 PROCEDURE — 96375 TX/PRO/DX INJ NEW DRUG ADDON: CPT

## 2023-05-15 PROCEDURE — 25000003 PHARM REV CODE 250: Performed by: STUDENT IN AN ORGANIZED HEALTH CARE EDUCATION/TRAINING PROGRAM

## 2023-05-15 PROCEDURE — 96361 HYDRATE IV INFUSION ADD-ON: CPT

## 2023-05-15 PROCEDURE — 63600175 PHARM REV CODE 636 W HCPCS: Performed by: PHYSICIAN ASSISTANT

## 2023-05-15 PROCEDURE — 93010 ELECTROCARDIOGRAM REPORT: CPT | Mod: ,,, | Performed by: INTERNAL MEDICINE

## 2023-05-15 PROCEDURE — 83735 ASSAY OF MAGNESIUM: CPT | Mod: 91 | Performed by: PHYSICIAN ASSISTANT

## 2023-05-15 PROCEDURE — 93005 ELECTROCARDIOGRAM TRACING: CPT

## 2023-05-15 PROCEDURE — 80053 COMPREHEN METABOLIC PANEL: CPT | Performed by: PHYSICIAN ASSISTANT

## 2023-05-15 PROCEDURE — 82330 ASSAY OF CALCIUM: CPT | Performed by: PHYSICIAN ASSISTANT

## 2023-05-15 PROCEDURE — 83970 ASSAY OF PARATHORMONE: CPT | Performed by: PHYSICIAN ASSISTANT

## 2023-05-15 RX ORDER — VERAPAMIL HYDROCHLORIDE 120 MG/1
120 TABLET, FILM COATED, EXTENDED RELEASE ORAL EVERY MORNING
Status: DISCONTINUED | OUTPATIENT
Start: 2023-05-16 | End: 2023-05-16

## 2023-05-15 RX ORDER — HYDROMORPHONE HYDROCHLORIDE 1 MG/ML
1 INJECTION, SOLUTION INTRAMUSCULAR; INTRAVENOUS; SUBCUTANEOUS EVERY 4 HOURS PRN
Status: DISCONTINUED | OUTPATIENT
Start: 2023-05-15 | End: 2023-05-15

## 2023-05-15 RX ORDER — LAMOTRIGINE 25 MG/1
50 TABLET ORAL NIGHTLY
Status: DISCONTINUED | OUTPATIENT
Start: 2023-05-15 | End: 2023-05-19 | Stop reason: HOSPADM

## 2023-05-15 RX ORDER — DEXTROSE MONOHYDRATE AND SODIUM CHLORIDE 5; .45 G/100ML; G/100ML
INJECTION, SOLUTION INTRAVENOUS CONTINUOUS
Status: DISCONTINUED | OUTPATIENT
Start: 2023-05-15 | End: 2023-05-19 | Stop reason: HOSPADM

## 2023-05-15 RX ORDER — KETOROLAC TROMETHAMINE 30 MG/ML
30 INJECTION, SOLUTION INTRAMUSCULAR; INTRAVENOUS ONCE
Status: COMPLETED | OUTPATIENT
Start: 2023-05-15 | End: 2023-05-15

## 2023-05-15 RX ORDER — LABETALOL 100 MG/1
200 TABLET, FILM COATED ORAL 2 TIMES DAILY
Status: DISCONTINUED | OUTPATIENT
Start: 2023-05-15 | End: 2023-05-19 | Stop reason: HOSPADM

## 2023-05-15 RX ORDER — TIZANIDINE 4 MG/1
4 TABLET ORAL EVERY 8 HOURS PRN
Status: DISCONTINUED | OUTPATIENT
Start: 2023-05-15 | End: 2023-05-19 | Stop reason: HOSPADM

## 2023-05-15 RX ORDER — SODIUM CHLORIDE 0.9 % (FLUSH) 0.9 %
10 SYRINGE (ML) INJECTION EVERY 12 HOURS PRN
Status: DISCONTINUED | OUTPATIENT
Start: 2023-05-15 | End: 2023-05-19 | Stop reason: HOSPADM

## 2023-05-15 RX ORDER — MAGNESIUM SULFATE HEPTAHYDRATE 40 MG/ML
2 INJECTION, SOLUTION INTRAVENOUS ONCE
Status: COMPLETED | OUTPATIENT
Start: 2023-05-15 | End: 2023-05-15

## 2023-05-15 RX ORDER — HYDROMORPHONE HYDROCHLORIDE 1 MG/ML
1 INJECTION, SOLUTION INTRAMUSCULAR; INTRAVENOUS; SUBCUTANEOUS
Status: COMPLETED | OUTPATIENT
Start: 2023-05-15 | End: 2023-05-15

## 2023-05-15 RX ORDER — OXYCODONE AND ACETAMINOPHEN 5; 325 MG/1; MG/1
1 TABLET ORAL EVERY 4 HOURS PRN
Status: DISCONTINUED | OUTPATIENT
Start: 2023-05-15 | End: 2023-05-15

## 2023-05-15 RX ORDER — ACETAMINOPHEN 325 MG/1
650 TABLET ORAL EVERY 4 HOURS PRN
Status: DISCONTINUED | OUTPATIENT
Start: 2023-05-15 | End: 2023-05-19 | Stop reason: HOSPADM

## 2023-05-15 RX ORDER — HYDROMORPHONE HYDROCHLORIDE 1 MG/ML
1 INJECTION, SOLUTION INTRAMUSCULAR; INTRAVENOUS; SUBCUTANEOUS
Status: DISCONTINUED | OUTPATIENT
Start: 2023-05-15 | End: 2023-05-15

## 2023-05-15 RX ORDER — HYDROMORPHONE HYDROCHLORIDE 1 MG/ML
0.5 INJECTION, SOLUTION INTRAMUSCULAR; INTRAVENOUS; SUBCUTANEOUS EVERY 4 HOURS PRN
Status: DISCONTINUED | OUTPATIENT
Start: 2023-05-15 | End: 2023-05-15

## 2023-05-15 RX ORDER — CLONIDINE HYDROCHLORIDE 0.1 MG/1
0.1 TABLET ORAL EVERY 8 HOURS PRN
Status: DISCONTINUED | OUTPATIENT
Start: 2023-05-15 | End: 2023-05-19 | Stop reason: HOSPADM

## 2023-05-15 RX ORDER — CLONIDINE HYDROCHLORIDE 0.1 MG/1
0.1 TABLET ORAL 3 TIMES DAILY PRN
COMMUNITY
Start: 2023-05-15

## 2023-05-15 RX ORDER — OXYCODONE AND ACETAMINOPHEN 5; 325 MG/1; MG/1
1 TABLET ORAL EVERY 4 HOURS PRN
Status: DISCONTINUED | OUTPATIENT
Start: 2023-05-15 | End: 2023-05-19 | Stop reason: HOSPADM

## 2023-05-15 RX ORDER — BUPROPION HYDROCHLORIDE 150 MG/1
300 TABLET ORAL DAILY
Status: DISCONTINUED | OUTPATIENT
Start: 2023-05-16 | End: 2023-05-19 | Stop reason: HOSPADM

## 2023-05-15 RX ORDER — NALOXONE HCL 0.4 MG/ML
0.02 VIAL (ML) INJECTION
Status: DISCONTINUED | OUTPATIENT
Start: 2023-05-15 | End: 2023-05-19 | Stop reason: HOSPADM

## 2023-05-15 RX ORDER — TALC
6 POWDER (GRAM) TOPICAL NIGHTLY PRN
Status: DISCONTINUED | OUTPATIENT
Start: 2023-05-15 | End: 2023-05-19 | Stop reason: HOSPADM

## 2023-05-15 RX ORDER — LORAZEPAM 2 MG/ML
1 INJECTION INTRAMUSCULAR EVERY 4 HOURS PRN
Status: DISCONTINUED | OUTPATIENT
Start: 2023-05-15 | End: 2023-05-19 | Stop reason: HOSPADM

## 2023-05-15 RX ORDER — LEVOTHYROXINE SODIUM 50 UG/1
50 TABLET ORAL
COMMUNITY
Start: 2023-04-25 | End: 2023-07-17

## 2023-05-15 RX ORDER — HYDRALAZINE HYDROCHLORIDE 25 MG/1
100 TABLET, FILM COATED ORAL ONCE
Status: COMPLETED | OUTPATIENT
Start: 2023-05-15 | End: 2023-05-15

## 2023-05-15 RX ORDER — LAMOTRIGINE 100 MG/1
100 TABLET ORAL EVERY MORNING
Status: DISCONTINUED | OUTPATIENT
Start: 2023-05-16 | End: 2023-05-16

## 2023-05-15 RX ORDER — POLYETHYLENE GLYCOL 3350 17 G/17G
17 POWDER, FOR SOLUTION ORAL 2 TIMES DAILY PRN
Status: DISCONTINUED | OUTPATIENT
Start: 2023-05-15 | End: 2023-05-19 | Stop reason: HOSPADM

## 2023-05-15 RX ORDER — QUETIAPINE FUMARATE 100 MG/1
100 TABLET, FILM COATED ORAL NIGHTLY
Status: DISCONTINUED | OUTPATIENT
Start: 2023-05-15 | End: 2023-05-19 | Stop reason: HOSPADM

## 2023-05-15 RX ORDER — OFATUMUMAB 20 MG/.4ML
INJECTION, SOLUTION SUBCUTANEOUS
COMMUNITY
Start: 2023-04-21

## 2023-05-15 RX ORDER — LEVOTHYROXINE SODIUM 50 UG/1
50 TABLET ORAL
Status: DISCONTINUED | OUTPATIENT
Start: 2023-05-16 | End: 2023-05-19 | Stop reason: HOSPADM

## 2023-05-15 RX ORDER — FOLIC ACID 1 MG/1
1 TABLET ORAL DAILY
Status: DISCONTINUED | OUTPATIENT
Start: 2023-05-16 | End: 2023-05-19 | Stop reason: HOSPADM

## 2023-05-15 RX ORDER — ESCITALOPRAM OXALATE 10 MG/1
10 TABLET ORAL DAILY
Status: DISCONTINUED | OUTPATIENT
Start: 2023-05-16 | End: 2023-05-19 | Stop reason: HOSPADM

## 2023-05-15 RX ORDER — MUPIROCIN 20 MG/G
OINTMENT TOPICAL 2 TIMES DAILY
Status: DISCONTINUED | OUTPATIENT
Start: 2023-05-15 | End: 2023-05-19 | Stop reason: HOSPADM

## 2023-05-15 RX ORDER — ONDANSETRON 2 MG/ML
4 INJECTION INTRAMUSCULAR; INTRAVENOUS
Status: COMPLETED | OUTPATIENT
Start: 2023-05-15 | End: 2023-05-15

## 2023-05-15 RX ORDER — ONDANSETRON 2 MG/ML
8 INJECTION INTRAMUSCULAR; INTRAVENOUS EVERY 6 HOURS PRN
Status: DISCONTINUED | OUTPATIENT
Start: 2023-05-15 | End: 2023-05-19 | Stop reason: HOSPADM

## 2023-05-15 RX ORDER — LANOLIN ALCOHOL/MO/W.PET/CERES
100 CREAM (GRAM) TOPICAL DAILY
Status: DISCONTINUED | OUTPATIENT
Start: 2023-05-16 | End: 2023-05-19 | Stop reason: HOSPADM

## 2023-05-15 RX ORDER — HYDROMORPHONE HYDROCHLORIDE 1 MG/ML
1 INJECTION, SOLUTION INTRAMUSCULAR; INTRAVENOUS; SUBCUTANEOUS
Status: DISCONTINUED | OUTPATIENT
Start: 2023-05-15 | End: 2023-05-19 | Stop reason: HOSPADM

## 2023-05-15 RX ADMIN — OXYCODONE AND ACETAMINOPHEN 1 TABLET: 5; 325 TABLET ORAL at 08:05

## 2023-05-15 RX ADMIN — DEXTROSE AND SODIUM CHLORIDE: 5; 450 INJECTION, SOLUTION INTRAVENOUS at 08:05

## 2023-05-15 RX ADMIN — QUETIAPINE FUMARATE 100 MG: 100 TABLET ORAL at 08:05

## 2023-05-15 RX ADMIN — MUPIROCIN: 20 OINTMENT TOPICAL at 10:05

## 2023-05-15 RX ADMIN — LORAZEPAM 1 MG: 2 INJECTION INTRAMUSCULAR; INTRAVENOUS at 05:05

## 2023-05-15 RX ADMIN — HYDROMORPHONE HYDROCHLORIDE 1 MG: 1 INJECTION, SOLUTION INTRAMUSCULAR; INTRAVENOUS; SUBCUTANEOUS at 06:05

## 2023-05-15 RX ADMIN — HYDROMORPHONE HYDROCHLORIDE 1 MG: 1 INJECTION, SOLUTION INTRAMUSCULAR; INTRAVENOUS; SUBCUTANEOUS at 09:05

## 2023-05-15 RX ADMIN — LORAZEPAM 1 MG: 2 INJECTION INTRAMUSCULAR; INTRAVENOUS at 10:05

## 2023-05-15 RX ADMIN — DEXTROSE AND SODIUM CHLORIDE: 5; 450 INJECTION, SOLUTION INTRAVENOUS at 11:05

## 2023-05-15 RX ADMIN — HYDROMORPHONE HYDROCHLORIDE 1 MG: 1 INJECTION, SOLUTION INTRAMUSCULAR; INTRAVENOUS; SUBCUTANEOUS at 04:05

## 2023-05-15 RX ADMIN — KETOROLAC TROMETHAMINE 30 MG: 30 INJECTION, SOLUTION INTRAMUSCULAR; INTRAVENOUS at 12:05

## 2023-05-15 RX ADMIN — SODIUM CHLORIDE, POTASSIUM CHLORIDE, SODIUM LACTATE AND CALCIUM CHLORIDE 1000 ML: 600; 310; 30; 20 INJECTION, SOLUTION INTRAVENOUS at 04:05

## 2023-05-15 RX ADMIN — ONDANSETRON 8 MG: 2 INJECTION INTRAMUSCULAR; INTRAVENOUS at 11:05

## 2023-05-15 RX ADMIN — HYDROMORPHONE HYDROCHLORIDE 1 MG: 1 INJECTION, SOLUTION INTRAMUSCULAR; INTRAVENOUS; SUBCUTANEOUS at 03:05

## 2023-05-15 RX ADMIN — IOHEXOL 75 ML: 350 INJECTION, SOLUTION INTRAVENOUS at 02:05

## 2023-05-15 RX ADMIN — CLONIDINE HYDROCHLORIDE 0.1 MG: 0.1 TABLET ORAL at 06:05

## 2023-05-15 RX ADMIN — HYDROMORPHONE HYDROCHLORIDE 0.5 MG: 1 INJECTION, SOLUTION INTRAMUSCULAR; INTRAVENOUS; SUBCUTANEOUS at 11:05

## 2023-05-15 RX ADMIN — HYDROMORPHONE HYDROCHLORIDE 0.5 MG: 1 INJECTION, SOLUTION INTRAMUSCULAR; INTRAVENOUS; SUBCUTANEOUS at 07:05

## 2023-05-15 RX ADMIN — LAMOTRIGINE 50 MG: 25 TABLET ORAL at 08:05

## 2023-05-15 RX ADMIN — CLONIDINE HYDROCHLORIDE 0.1 MG: 0.1 TABLET ORAL at 08:05

## 2023-05-15 RX ADMIN — ONDANSETRON 4 MG: 2 INJECTION INTRAMUSCULAR; INTRAVENOUS at 04:05

## 2023-05-15 RX ADMIN — THIAMINE HYDROCHLORIDE: 100 INJECTION, SOLUTION INTRAMUSCULAR; INTRAVENOUS at 11:05

## 2023-05-15 RX ADMIN — HYDRALAZINE HYDROCHLORIDE 100 MG: 25 TABLET, FILM COATED ORAL at 11:05

## 2023-05-15 RX ADMIN — MAGNESIUM SULFATE HEPTAHYDRATE 2 G: 40 INJECTION, SOLUTION INTRAVENOUS at 06:05

## 2023-05-15 RX ADMIN — MUPIROCIN: 20 OINTMENT TOPICAL at 08:05

## 2023-05-15 RX ADMIN — LABETALOL HYDROCHLORIDE 200 MG: 100 TABLET, FILM COATED ORAL at 08:05

## 2023-05-15 RX ADMIN — OXYCODONE AND ACETAMINOPHEN 1 TABLET: 5; 325 TABLET ORAL at 11:05

## 2023-05-15 RX ADMIN — TIZANIDINE 4 MG: 4 TABLET ORAL at 08:05

## 2023-05-15 NOTE — SUBJECTIVE & OBJECTIVE
Past Medical History:   Diagnosis Date    Depression     Hypertension     Multiple sclerosis        Past Surgical History:   Procedure Laterality Date     SECTION  2006    DILATION AND CURETTAGE OF UTERUS      LITHOTRIPSY      PORTACATH PLACEMENT Right 02/10/2020       Review of patient's allergies indicates:   Allergen Reactions    Metoclopramide hcl     Tramadol Anaphylaxis       No current facility-administered medications on file prior to encounter.     Current Outpatient Medications on File Prior to Encounter   Medication Sig    buprenorphine-naloxone 8-2 mg (SUBOXONE) 8-2 mg Place 1 each under the tongue 2 (two) times a day.    buPROPion (WELLBUTRIN XL) 300 MG 24 hr tablet Take 300 mg by mouth once daily.    cloNIDine (CATAPRES) 0.1 MG tablet Take 0.1 mg by mouth 3 (three) times daily as needed (withdrawal, anxiety, insomnia).    EScitalopram oxalate (LEXAPRO) 10 MG tablet Take 10 mg by mouth once daily.    KESIMPTA PEN 20 mg/0.4 mL PnIj SMARTSI Milligram(s) SUB-Q Once a Month    labetaloL (NORMODYNE) 200 MG tablet Take 200 mg by mouth 2 (two) times daily.    lamoTRIgine (LAMICTAL) 100 MG tablet Take 100 mg by mouth every morning.    lamoTRIgine 50 mg TbDL Take 50 mg by mouth every evening.    levothyroxine (SYNTHROID) 50 MCG tablet Take 50 mcg by mouth before breakfast.    QUEtiapine (SEROQUEL) 100 MG Tab Take 1 tablet (100 mg total) by mouth every evening.    tiZANidine (ZANAFLEX) 4 MG tablet TAKE 1 TABLET BY MOUTH EVERY 8 HOURS    verapamiL (CALAN-SR) 120 MG CR tablet TAKE 1 TABLET BY MOUTH EVERY MORNING BEFORE BREAKFAST    [DISCONTINUED] levothyroxine (SYNTHROID) 25 MCG tablet Take 2 tablets (50 mcg total) by mouth before breakfast.    [DISCONTINUED] ergocalciferol (ERGOCALCIFEROL) 50,000 unit Cap Take 50,000 Units by mouth every 7 days.     Family History    No history of diabetes or heart disease       Tobacco Use    Smoking status: Former     Types: Vaping with nicotine    Smokeless  tobacco: Former   Substance and Sexual Activity    Alcohol use: Yes     Comment: occassionally    Drug use: Never    Sexual activity: Not on file     Review of Systems   Constitutional:  Negative for chills and fever.   HENT:  Negative for ear discharge and ear pain.    Eyes:  Negative for discharge and itching.   Respiratory:  Negative for cough and shortness of breath.    Cardiovascular:  Negative for chest pain and leg swelling.   Gastrointestinal:  Positive for abdominal pain and vomiting.   Endocrine: Negative for cold intolerance and heat intolerance.   Genitourinary:  Negative for difficulty urinating and dysuria.   Musculoskeletal:  Negative for neck pain and neck stiffness.   Skin:  Negative for rash and wound.   Neurological:  Negative for seizures and syncope.   Psychiatric/Behavioral:  Negative for agitation and hallucinations.    Objective:     Vital Signs (Most Recent):  Temp: 97.4 °F (36.3 °C) (05/15/23 1527)  Pulse: 97 (05/15/23 1527)  Resp: 20 (05/15/23 1527)  BP: (!) 181/134 (05/15/23 1527)  SpO2: 97 % (05/15/23 1527) Vital Signs (24h Range):  Temp:  [97.4 °F (36.3 °C)-98.3 °F (36.8 °C)] 97.4 °F (36.3 °C)  Pulse:  [] 97  Resp:  [16-22] 20  SpO2:  [95 %-98 %] 97 %  BP: (142-186)/() 181/134     Weight: 67.6 kg (149 lb)  Body mass index is 29.1 kg/m².     Physical Exam  Constitutional:       Appearance: She is ill-appearing.      Comments: Uncomfortable    HENT:      Head: Normocephalic and atraumatic.      Mouth/Throat:      Mouth: Mucous membranes are dry.      Pharynx: No oropharyngeal exudate or posterior oropharyngeal erythema.   Cardiovascular:      Rate and Rhythm: Regular rhythm. Tachycardia present.   Pulmonary:      Effort: No respiratory distress.      Breath sounds: Normal breath sounds.   Abdominal:      General: There is no distension.      Palpations: Abdomen is soft.      Tenderness: There is guarding.      Comments: Diffusely tender over all quadrants   Musculoskeletal:          General: No deformity or signs of injury.   Skin:     General: Skin is warm and dry.   Neurological:      Mental Status: She is oriented to person, place, and time. Mental status is at baseline.   Psychiatric:         Mood and Affect: Mood is anxious.              Significant Labs: All pertinent labs within the past 24 hours have been reviewed.  BMP:   Recent Labs   Lab 05/15/23  0451 05/15/23  0613   *  --      --    K 3.6  --      --    CO2 23  --    BUN 11  --    CREATININE 1.0  --    CALCIUM 13.4*  --    MG 1.4* 1.2*     CBC:   Recent Labs   Lab 05/15/23  0451   WBC 16.74*   HGB 15.8   HCT 45.4          Significant Imaging: I have reviewed all pertinent imaging results/findings within the past 24 hours.

## 2023-05-15 NOTE — HPI
42-year-old female presents with abdominal pain.  Past medical history significant for multiple sclerosis, hypertension and chronic pain on Suboxone.  She was admitted to hospital last month with acute pancreatitis secondary to alcohol.  Endorses using alcohol over the weekend for mother's Day.  She woke up this morning shortly prior to arrival with severe abdominal pain to the left upper quadrant with nausea and vomiting.  Pain radiates to her back.  She presents hypertensive and tachycardic and very uncomfortable secondary to pain.  She denies any fever or chills.  No alleviating or aggravating factors.  Work up consistent with acute pancreatitis.  No other complaints.

## 2023-05-15 NOTE — ED PROVIDER NOTES
Encounter Date: 5/15/2023       History     Chief Complaint   Patient presents with    Pancreatitis     Pain X a few hours, threw up just once     42-year-old female presents with abdominal pain.  Past medical history significant for multiple sclerosis, hypertension, chronic pain on Suboxone.  Diagnosed with pancreatitis a few months ago, admitted to the hospital last month with pancreatitis after all consumption.  Endorses using alcohol over the weekend for mother's Day.  Patient does not ambulate much at baseline secondary to her MS.  Only a couple of steps at best.  She states that she woke up this morning shortly prior to arrival with severe abdominal pain to the left upper quadrant with nausea and vomiting.  She presents hypertensive and tachycardic.  She appears very uncomfortable.    Review of patient's allergies indicates:   Allergen Reactions    Metoclopramide hcl     Tramadol Anaphylaxis     Past Medical History:   Diagnosis Date    Depression     Hypertension     Multiple sclerosis      Past Surgical History:   Procedure Laterality Date     SECTION  2006    DILATION AND CURETTAGE OF UTERUS      LITHOTRIPSY      PORTACATH PLACEMENT Right 02/10/2020     Family History   Adopted: Yes     Social History     Tobacco Use    Smoking status: Former     Types: Vaping with nicotine    Smokeless tobacco: Former   Substance Use Topics    Alcohol use: Yes     Comment: occassionally    Drug use: Never     Review of Systems   Constitutional:  Negative for fever.   HENT:  Negative for sore throat.    Respiratory:  Negative for shortness of breath.    Cardiovascular:  Negative for chest pain.   Gastrointestinal:  Positive for abdominal pain, nausea and vomiting.   Genitourinary:  Negative for dysuria.   Musculoskeletal:  Negative for back pain.   Skin:  Negative for rash.   Neurological:  Negative for weakness.   Hematological:  Does not bruise/bleed easily.     Physical Exam     Initial Vitals [05/15/23  0409]   BP Pulse Resp Temp SpO2   (!) 180/135 (!) 156 (!) 22 98 °F (36.7 °C) 96 %      MAP       --         Physical Exam    Constitutional: Vital signs are normal. She appears well-developed and well-nourished.   HENT:   Head: Normocephalic and atraumatic.   Right Ear: Hearing normal.   Left Ear: Hearing normal.   Eyes: Conjunctivae are normal.   Cardiovascular:  Normal rate and regular rhythm.           Pulmonary/Chest:   Clear on exam   Abdominal: Abdomen is soft. Bowel sounds are normal.   Tenderness noted to the left upper quadrant   Musculoskeletal:         General: Normal range of motion.     Neurological: She is alert and oriented to person, place, and time.   Skin: Skin is warm and intact.   Psychiatric: She has a normal mood and affect. Her speech is normal and behavior is normal. Cognition and memory are normal.       ED Course   Procedures  Labs Reviewed   CBC W/ AUTO DIFFERENTIAL - Abnormal; Notable for the following components:       Result Value    WBC 16.74 (*)     Gran # (ANC) 13.6 (*)     Immature Grans (Abs) 0.06 (*)     Gran % 81.0 (*)     Lymph % 12.7 (*)     All other components within normal limits   COMPREHENSIVE METABOLIC PANEL - Abnormal; Notable for the following components:    Glucose 196 (*)     Calcium 13.4 (*)     ALT 54 (*)     Anion Gap 17 (*)     All other components within normal limits    Narrative:     Calcium critical result(s) called and verbal readback obtained from   Dr. Ornelas  by CD4 05/15/2023 05:37   LIPASE - Abnormal; Notable for the following components:    Lipase 765 (*)     All other components within normal limits   URINALYSIS, REFLEX TO URINE CULTURE - Abnormal; Notable for the following components:    Ketones, UA Trace (*)     All other components within normal limits    Narrative:     Specimen Source->Urine   MAGNESIUM - Abnormal; Notable for the following components:    Magnesium 1.4 (*)     All other components within normal limits   MAGNESIUM - Abnormal;  Notable for the following components:    Magnesium 1.2 (*)     All other components within normal limits   PTH, INTACT - Abnormal; Notable for the following components:    PTH, Intact 8.6 (*)     All other components within normal limits   MAGNESIUM   PHOSPHORUS          Imaging Results              US Abdomen Complete (Final result)  Result time 05/15/23 08:35:01      Final result by Clark Ordonez MD (05/15/23 08:35:01)                   Impression:      Hepatomegaly with diffusely increased hepatic echogenicity consistent with fatty changes of the liver.    Atrophic right kidney.    1.5 cm simple left renal cyst.    Small amount of ascites.      Electronically signed by: Clark Ordonez MD  Date:    05/15/2023  Time:    08:35               Narrative:    EXAMINATION:  US ABDOMEN COMPLETE    CLINICAL HISTORY:  acute pancreatitis;    TECHNIQUE:  Complete abdominal ultrasound (including pancreas, aorta, liver, gallbladder, common bile duct, IVC, kidneys, and spleen) was performed.    COMPARISON:  None    FINDINGS:  Pancreas: The visualized portions of pancreas appear normal.    Aorta: The proximal to mid abdominal aorta are normal in caliber.  The distal abdominal aorta is obscured by overlying bowel gas.    Liver: 19.2 cm in length, mildly enlarged.  Diffusely increased hepatic echogenicity consistent with fatty changes of the liver.  No focal lesions.    Gallbladder: No calculi, wall thickening, or pericholecystic fluid.  Negative sonographic Correa's sign.    Biliary system: 5 mm common bile duct.  No intrahepatic ductal dilatation.    Inferior vena cava: Normal in appearance.    Right kidney: Atrophic right kidney measuring 5.9 cm in length.  No solid renal masses or hydronephrosis.    Left kidney: 14.5 cm in length.  There is a simple cyst within the upper pole of the left kidney measuring 1.5 cm in size.  No solid renal masses are identified.  There is no evidence for hydronephrosis.    Spleen: 9.7 x 3.8 cm.   Normal in size with homogeneous echotexture.    Miscellaneous: With there is a small amount of ascites present.                                       Medications   sodium chloride 0.9% flush 10 mL (has no administration in time range)   naloxone 0.4 mg/mL injection 0.02 mg (has no administration in time range)   dextrose 5 % and 0.45 % NaCl infusion ( Intravenous Restarted 5/15/23 1708)   melatonin tablet 6 mg (has no administration in time range)   mupirocin 2 % ointment ( Nasal Given 5/15/23 2003)   polyethylene glycol packet 17 g (has no administration in time range)   ondansetron injection 8 mg (8 mg Intravenous Given 5/15/23 1151)   acetaminophen tablet 650 mg (has no administration in time range)   cloNIDine tablet 0.1 mg (0.1 mg Oral Given 5/15/23 6913)   multivitamin liquid no.118 per dose 10 mL (has no administration in time range)   thiamine tablet 100 mg (has no administration in time range)   folic acid tablet 1 mg (has no administration in time range)   oxyCODONE-acetaminophen 5-325 mg per tablet 1 tablet (1 tablet Oral Given 5/15/23 0839)   HYDROmorphone injection 1 mg (1 mg Intravenous Given 5/15/23 1836)   buPROPion TB24 tablet 300 mg (has no administration in time range)   EScitalopram oxalate tablet 10 mg (has no administration in time range)   labetaloL tablet 200 mg (200 mg Oral Given 5/15/23 2002)   lamoTRIgine tablet 100 mg (has no administration in time range)   lamoTRIgine tablet 50 mg (50 mg Oral Given 5/15/23 2002)   levothyroxine tablet 50 mcg (has no administration in time range)   QUEtiapine tablet 100 mg (100 mg Oral Given 5/15/23 2002)   tiZANidine tablet 4 mg (4 mg Oral Given 5/15/23 2005)   verapamiL CR tablet 120 mg (has no administration in time range)   LORazepam injection 1 mg (1 mg Intravenous Given 5/15/23 4602)   lactated ringers bolus 1,000 mL (0 mLs Intravenous Stopped 5/15/23 0610)   ondansetron injection 4 mg (4 mg Intravenous Given 5/15/23 6523)   HYDROmorphone injection  1 mg (1 mg Intravenous Given 5/15/23 6482)   HYDROmorphone injection 1 mg (1 mg Intravenous Given 5/15/23 0614)   magnesium sulfate 2g in water 50mL IVPB (premix) (0 g Intravenous Stopped 5/15/23 0820)   sodium chloride 0.9% 1,000 mL with mvi, (ADULT) no.4 with vit K 3,300 unit- 150 mcg/10 mL 10 mL, thiamine 100 mg, folic acid 1 mg infusion ( Intravenous New Bag 5/15/23 1128)   ketorolac injection 30 mg (30 mg Intravenous Given 5/15/23 1251)   iohexoL (OMNIPAQUE 350) injection 75 mL (75 mLs Intravenous Given 5/15/23 1446)     Medical Decision Making:   History:   Old Medical Records: I decided to obtain old medical records.  Old Records Summarized: records from clinic visits.  Initial Assessment:   42-year-old female presenting with abdominal pain  Differential Diagnosis:   Pancreatitis, gallstones, gastroenteritis, bowel obstruction, appendicitis  Independently Interpreted Test(s):   I have ordered and independently interpreted EKG Reading(s) - see summary below       <> Summary of EKG Reading(s): Sinus tachycardia, no acute ischemia  Clinical Tests:   Lab Tests: Ordered and Reviewed  Medical Tests: Ordered and Reviewed  ED Management:  Plan:   Patient with left upper quadrant abdominal pain, nausea and vomiting.  Recent bout of pancreatitis that feels similar to this.  Moderately uncomfortable on exam, tachycardic, which she was last time she came in in pain.   Routine labs, fluids and pain medication and reassessment.  NPO.    5:45 a.m. assessment  Calcium elevated greater than 13, lipase greater than 700.  Patient with clinical exam findings consistent with pancreatitis, similar to her prior episode associated with drinking, she did consume alcohol over the weekend yesterday.  Pain improved in the ED with hydromorphone and fluids.  Case discussed with case management and Hospital Medicine.  Will admit for inpatient treatment           ED Course as of 05/15/23 2044   Mon May 15, 2023   0600 EKG 12-lead  Time 5:55  a.m.     Rate 100, sinus, regular rhythm, right axis deviation.   QRS 82 .  No ST elevation or depression.  T-wave inversion V2.  No hyperacute T-waves.      Normal sinus rhythm rightward axis.   [JM]      ED Course User Index  [JM] Gabe Ornelas MD                 Clinical Impression:   Final diagnoses:  [R10.9] Abdominal pain  [R00.0] Tachycardia  [K85.90] Pancreatitis        ED Disposition Condition    Admit Stable                Juanito Wang PA-C  05/15/23 2725       Juanito Wang PA-C  05/15/23 8437

## 2023-05-15 NOTE — NURSING
[facility]  Nurses Note -- 4 Eyes      5/15/2023       Skin assessed on: Admit      [x] No Pressure Injuries Present    [x]Prevention Measures Documented    [] Yes LDA  for Pressure Injury Previously documented     [] Yes New Pressure Injury Discovered   [] LDA for New Pressure Injury Added      Attending RN:  Sherin Man, RN     Second RN:  Kerrie

## 2023-05-15 NOTE — ASSESSMENT & PLAN NOTE
Recurrent pancreatitis secondary to ETOH abuse.  Place NPO.  IVF hydration.  Anti emetics and prn pain medications.  Leukocytosis.  Will check CT of abdomen.  Continue supportive care.

## 2023-05-15 NOTE — ED NOTES
Report received from DANN Pettit. Care taken over. Pt awake and alert; resting quietly on stretcher.  Pt remains on continuous cardiac and pulse ox monitoring with non-invasive blood pressure to cycle every 30 minutes.  VS stable; NSR noted. Pt reports pain (10/10) at this time..  Pt denies restroom needs at this time; is able to reposition self on stretcher. Bed locked in lowest position; side rails up and locked x 2; call light, bedside table, and personal belongings within reach. Room assessed for safety measures and cleanliness; no action needed at this time. Plan of care discussed.  Pt instructed to alert nurse for assistance and before attempting to get out of bed; verbalizes understanding. Will continue to monitor.

## 2023-05-15 NOTE — LETTER
May 15, 2023         [place] [address]  Phone: [phone]  Fax: [phone]       Patient: Janis Reese   YOB: 1980  Date of Visit: 05/15/2023    To Whom It May Concern:    Opal Reese  was at [facility] on 05/15/2023. If you have any questions or concerns, or if I can be of further assistance, please do not hesitate to contact me.    Sincerely,    Sherin Man

## 2023-05-15 NOTE — ASSESSMENT & PLAN NOTE
Probably related to MS.  On home Suboxone.  Currently in severe pain secondary to acute pancreatitis.  Treat acute pain and restart Suboxone upon discharge.

## 2023-05-15 NOTE — H&P
[facility] Medicine  History & Physical    Patient Name: Janis Reese  MRN: [id number]  Patient Class: IP- Inpatient  Admission Date: 5/15/2023  Attending Physician: Chad Mercado MD   Primary Care Provider: Rula Betancourt MD         Patient information was obtained from patient and ER records.     Subjective:     Principal Problem:Acute pancreatitis    Chief Complaint:   Chief Complaint   Patient presents with    Pancreatitis     Pain X a few hours, threw up just once        HPI: 42-year-old female presents with abdominal pain.  Past medical history significant for multiple sclerosis, hypertension and chronic pain on Suboxone.  She was admitted to hospital last month with acute pancreatitis secondary to alcohol.  Endorses using alcohol over the weekend for mother's Day.  She woke up this morning shortly prior to arrival with severe abdominal pain to the left upper quadrant with nausea and vomiting.  Pain radiates to her back.  She presents hypertensive and tachycardic and very uncomfortable secondary to pain.  She denies any fever or chills.  No alleviating or aggravating factors.  Work up consistent with acute pancreatitis.  No other complaints.      Past Medical History:   Diagnosis Date    Depression     Hypertension     Multiple sclerosis        Past Surgical History:   Procedure Laterality Date     SECTION  2006    DILATION AND CURETTAGE OF UTERUS      LITHOTRIPSY      PORTACATH PLACEMENT Right 02/10/2020       Review of patient's allergies indicates:   Allergen Reactions    Metoclopramide hcl     Tramadol Anaphylaxis       No current facility-administered medications on file prior to encounter.     Current Outpatient Medications on File Prior to Encounter   Medication Sig    buprenorphine-naloxone 8-2 mg (SUBOXONE) 8-2 mg Place 1 each under the tongue 2 (two) times a day.    buPROPion (WELLBUTRIN XL) 300 MG 24 hr tablet Take 300 mg by mouth once  daily.    cloNIDine (CATAPRES) 0.1 MG tablet Take 0.1 mg by mouth 3 (three) times daily as needed (withdrawal, anxiety, insomnia).    EScitalopram oxalate (LEXAPRO) 10 MG tablet Take 10 mg by mouth once daily.    KESIMPTA PEN 20 mg/0.4 mL PnIj SMARTSI Milligram(s) SUB-Q Once a Month    labetaloL (NORMODYNE) 200 MG tablet Take 200 mg by mouth 2 (two) times daily.    lamoTRIgine (LAMICTAL) 100 MG tablet Take 100 mg by mouth every morning.    lamoTRIgine 50 mg TbDL Take 50 mg by mouth every evening.    levothyroxine (SYNTHROID) 50 MCG tablet Take 50 mcg by mouth before breakfast.    QUEtiapine (SEROQUEL) 100 MG Tab Take 1 tablet (100 mg total) by mouth every evening.    tiZANidine (ZANAFLEX) 4 MG tablet TAKE 1 TABLET BY MOUTH EVERY 8 HOURS    verapamiL (CALAN-SR) 120 MG CR tablet TAKE 1 TABLET BY MOUTH EVERY MORNING BEFORE BREAKFAST    [DISCONTINUED] levothyroxine (SYNTHROID) 25 MCG tablet Take 2 tablets (50 mcg total) by mouth before breakfast.    [DISCONTINUED] ergocalciferol (ERGOCALCIFEROL) 50,000 unit Cap Take 50,000 Units by mouth every 7 days.     Family History    No history of diabetes or heart disease       Tobacco Use    Smoking status: Former     Types: Vaping with nicotine    Smokeless tobacco: Former   Substance and Sexual Activity    Alcohol use: Yes     Comment: occassionally    Drug use: Never    Sexual activity: Not on file     Review of Systems   Constitutional:  Negative for chills and fever.   HENT:  Negative for ear discharge and ear pain.    Eyes:  Negative for discharge and itching.   Respiratory:  Negative for cough and shortness of breath.    Cardiovascular:  Negative for chest pain and leg swelling.   Gastrointestinal:  Positive for abdominal pain and vomiting.   Endocrine: Negative for cold intolerance and heat intolerance.   Genitourinary:  Negative for difficulty urinating and dysuria.   Musculoskeletal:  Negative for neck pain and neck stiffness.   Skin:  Negative for  rash and wound.   Neurological:  Negative for seizures and syncope.   Psychiatric/Behavioral:  Negative for agitation and hallucinations.    Objective:     Vital Signs (Most Recent):  Temp: 97.4 °F (36.3 °C) (05/15/23 1527)  Pulse: 97 (05/15/23 1527)  Resp: 20 (05/15/23 1527)  BP: (!) 181/134 (05/15/23 1527)  SpO2: 97 % (05/15/23 1527) Vital Signs (24h Range):  Temp:  [97.4 °F (36.3 °C)-98.3 °F (36.8 °C)] 97.4 °F (36.3 °C)  Pulse:  [] 97  Resp:  [16-22] 20  SpO2:  [95 %-98 %] 97 %  BP: (142-186)/() 181/134     Weight: 67.6 kg (149 lb)  Body mass index is 29.1 kg/m².     Physical Exam  Constitutional:       Appearance: She is ill-appearing.      Comments: Uncomfortable    HENT:      Head: Normocephalic and atraumatic.      Mouth/Throat:      Mouth: Mucous membranes are dry.      Pharynx: No oropharyngeal exudate or posterior oropharyngeal erythema.   Cardiovascular:      Rate and Rhythm: Regular rhythm. Tachycardia present.   Pulmonary:      Effort: No respiratory distress.      Breath sounds: Normal breath sounds.   Abdominal:      General: There is no distension.      Palpations: Abdomen is soft.      Tenderness: There is guarding.      Comments: Diffusely tender over all quadrants   Musculoskeletal:         General: No deformity or signs of injury.   Skin:     General: Skin is warm and dry.   Neurological:      Mental Status: She is oriented to person, place, and time. Mental status is at baseline.   Psychiatric:         Mood and Affect: Mood is anxious.              Significant Labs: All pertinent labs within the past 24 hours have been reviewed.  BMP:   Recent Labs   Lab 05/15/23  0451 05/15/23  0613   *  --      --    K 3.6  --      --    CO2 23  --    BUN 11  --    CREATININE 1.0  --    CALCIUM 13.4*  --    MG 1.4* 1.2*     CBC:   Recent Labs   Lab 05/15/23  0451   WBC 16.74*   HGB 15.8   HCT 45.4          Significant Imaging: I have reviewed all pertinent imaging  results/findings within the past 24 hours.    Assessment/Plan:     * Acute pancreatitis  Recurrent pancreatitis secondary to ETOH abuse.  Place NPO.  IVF hydration.  Anti emetics and prn pain medications.  Leukocytosis.  Will check CT of abdomen.  Continue supportive care.      Alcohol abuse  Counseled on importance of cessation as leading to recurrent pancreatitis.  MVI, Thiamine, Folate.  Ativan prn withdrawal symptoms.      Chronic pain  Probably related to MS.  On home Suboxone.  Currently in severe pain secondary to acute pancreatitis.  Treat acute pain and restart Suboxone upon discharge.      Hypercalcemia  Probably related to fluid depletion.  IVF's and recheck labs in Am.  Also check PTH in Am.      Leukocytosis  Probably reactive and secondary to acute issues.  Afebrile.  Will monitor off ABx's for now.      Bipolar 1 disorder  Stable  Resume home medications.      Essential hypertension  Uncontrolled and probably exacerbated by pain.  Resume home medications with parameters.  Add prn Clonidine.      Multiple sclerosis  Outpatient follow up.        VTE Risk Mitigation (From admission, onward)         Ordered     Place DAVID hose  Until discontinued         05/15/23 0647     IP VTE LOW RISK PATIENT  Once         05/15/23 0647     Place sequential compression device  Until discontinued         05/15/23 0647                           Chad Mercado MD  Department of Hospital Medicine  [facility] [place] [facility]

## 2023-05-15 NOTE — ASSESSMENT & PLAN NOTE
Counseled on importance of cessation as leading to recurrent pancreatitis.  MVI, Thiamine, Folate.  Ativan prn withdrawal symptoms.     Patient reports side effect of nausea and vomiting with Morphine with his surgery.  Add to allergies?    Patient stopped ASA several months ago.  Would like to discuss.    Complains of urine frequency at night.  Gets up 2 to 3 times nightly.

## 2023-05-15 NOTE — ASSESSMENT & PLAN NOTE
Uncontrolled and probably exacerbated by pain.  Resume home medications with parameters.  Add prn Clonidine.

## 2023-05-15 NOTE — NURSING
Arrived to the floor from ED.   at the bedside.  Oriented to the room.      ED reported difficulty starting IV site.  Spoke to Dr Mercado, stated ok to holf IVF for banana bag and resume IVF once banana bag complete.

## 2023-05-15 NOTE — ED NOTES
"Attempted to start an IV line on pt without success. Called pharmacy to see if NaCl 0.9% with vitamins were compatible with d5w 1/2 NaCl, but was told they were unsure and could not tell me. Pharmacy tech stated "check compatibility in epic and that can't tell you then I can't."  "

## 2023-05-15 NOTE — PHARMACY MED REC
"Admission Medication History     The home medication history was taken by Ekaterina Mei CPhT.      You may go to "Admission" then "Reconcile Home Medications" tabs to review and/or act upon these items.     The home medication list has been updated by the Pharmacy department.   Please read ALL comments highlighted in yellow.   Please address this information as you see fit.    Feel free to contact us if you have any questions or require assistance.      The medications listed below were removed from the home medication list. Please reorder if appropriate:  Patient reports no longer taking the following medication(s):  Ergocalciferol 50,000 unira  Levothyroxine 25 mcg      Medications listed below were obtained from: Patient/family and Analytic software- [employer]  (Not in a hospital admission)        Ekaterina Mei CPhT.  [phone]                  .        "

## 2023-05-15 NOTE — NURSING
[facility] MEWS TRIGGER FOLLOW UP       MEWS Monitoring, Score is: 4  Indication for review: BP    Bedside NurseSherin contacted, no concerns verbalized at this time, instructed to call [phone] for further concerns or assistance..

## 2023-05-16 LAB
ALBUMIN SERPL BCP-MCNC: 3.4 G/DL (ref 3.5–5.2)
ALP SERPL-CCNC: 81 U/L (ref 55–135)
ALT SERPL W/O P-5'-P-CCNC: 34 U/L (ref 10–44)
ANION GAP SERPL CALC-SCNC: 9 MMOL/L (ref 8–16)
AST SERPL-CCNC: 25 U/L (ref 10–40)
BASOPHILS # BLD AUTO: 0.01 K/UL (ref 0–0.2)
BASOPHILS NFR BLD: 0.1 % (ref 0–1.9)
BILIRUB SERPL-MCNC: 0.6 MG/DL (ref 0.1–1)
BUN SERPL-MCNC: 19 MG/DL (ref 6–20)
CALCIUM SERPL-MCNC: 9.6 MG/DL (ref 8.7–10.5)
CHLORIDE SERPL-SCNC: 99 MMOL/L (ref 95–110)
CO2 SERPL-SCNC: 24 MMOL/L (ref 23–29)
CREAT SERPL-MCNC: 1 MG/DL (ref 0.5–1.4)
DIFFERENTIAL METHOD: ABNORMAL
EOSINOPHIL # BLD AUTO: 0 K/UL (ref 0–0.5)
EOSINOPHIL NFR BLD: 0.2 % (ref 0–8)
ERYTHROCYTE [DISTWIDTH] IN BLOOD BY AUTOMATED COUNT: 12.1 % (ref 11.5–14.5)
EST. GFR  (NO RACE VARIABLE): >60 ML/MIN/1.73 M^2
GLUCOSE SERPL-MCNC: 182 MG/DL (ref 70–110)
HCT VFR BLD AUTO: 38.4 % (ref 37–48.5)
HGB BLD-MCNC: 13.5 G/DL (ref 12–16)
IMM GRANULOCYTES # BLD AUTO: 0.1 K/UL (ref 0–0.04)
IMM GRANULOCYTES NFR BLD AUTO: 0.5 % (ref 0–0.5)
LIPASE SERPL-CCNC: 564 U/L (ref 4–60)
LYMPHOCYTES # BLD AUTO: 1.2 K/UL (ref 1–4.8)
LYMPHOCYTES NFR BLD: 6 % (ref 18–48)
MAGNESIUM SERPL-MCNC: 1.6 MG/DL (ref 1.6–2.6)
MCH RBC QN AUTO: 31.3 PG (ref 27–31)
MCHC RBC AUTO-ENTMCNC: 35.2 G/DL (ref 32–36)
MCV RBC AUTO: 89 FL (ref 82–98)
MONOCYTES # BLD AUTO: 0.9 K/UL (ref 0.3–1)
MONOCYTES NFR BLD: 4.4 % (ref 4–15)
NEUTROPHILS # BLD AUTO: 17.4 K/UL (ref 1.8–7.7)
NEUTROPHILS NFR BLD: 88.8 % (ref 38–73)
NRBC BLD-RTO: 0 /100 WBC
PHOSPHATE SERPL-MCNC: 2.7 MG/DL (ref 2.7–4.5)
PLATELET # BLD AUTO: 185 K/UL (ref 150–450)
PMV BLD AUTO: 10.2 FL (ref 9.2–12.9)
POTASSIUM SERPL-SCNC: 3.8 MMOL/L (ref 3.5–5.1)
PROT SERPL-MCNC: 5.7 G/DL (ref 6–8.4)
PTH-INTACT SERPL-MCNC: 34.8 PG/ML (ref 9–77)
RBC # BLD AUTO: 4.31 M/UL (ref 4–5.4)
SODIUM SERPL-SCNC: 132 MMOL/L (ref 136–145)
WBC # BLD AUTO: 19.63 K/UL (ref 3.9–12.7)

## 2023-05-16 PROCEDURE — 25000003 PHARM REV CODE 250: Performed by: INTERNAL MEDICINE

## 2023-05-16 PROCEDURE — 83735 ASSAY OF MAGNESIUM: CPT | Performed by: HOSPITALIST

## 2023-05-16 PROCEDURE — 25000003 PHARM REV CODE 250: Performed by: HOSPITALIST

## 2023-05-16 PROCEDURE — S5010 5% DEXTROSE AND 0.45% SALINE: HCPCS | Performed by: HOSPITALIST

## 2023-05-16 PROCEDURE — 83690 ASSAY OF LIPASE: CPT | Performed by: HOSPITALIST

## 2023-05-16 PROCEDURE — 80053 COMPREHEN METABOLIC PANEL: CPT | Performed by: HOSPITALIST

## 2023-05-16 PROCEDURE — 25000003 PHARM REV CODE 250: Performed by: STUDENT IN AN ORGANIZED HEALTH CARE EDUCATION/TRAINING PROGRAM

## 2023-05-16 PROCEDURE — 85025 COMPLETE CBC W/AUTO DIFF WBC: CPT | Performed by: HOSPITALIST

## 2023-05-16 PROCEDURE — 63600175 PHARM REV CODE 636 W HCPCS: Performed by: STUDENT IN AN ORGANIZED HEALTH CARE EDUCATION/TRAINING PROGRAM

## 2023-05-16 PROCEDURE — 36415 COLL VENOUS BLD VENIPUNCTURE: CPT | Performed by: HOSPITALIST

## 2023-05-16 PROCEDURE — 83970 ASSAY OF PARATHORMONE: CPT | Performed by: HOSPITALIST

## 2023-05-16 PROCEDURE — 11000001 HC ACUTE MED/SURG PRIVATE ROOM

## 2023-05-16 PROCEDURE — 84100 ASSAY OF PHOSPHORUS: CPT | Performed by: HOSPITALIST

## 2023-05-16 RX ORDER — LAMOTRIGINE 100 MG/1
100 TABLET ORAL DAILY
Status: DISCONTINUED | OUTPATIENT
Start: 2023-05-16 | End: 2023-05-19 | Stop reason: HOSPADM

## 2023-05-16 RX ORDER — LORAZEPAM 2 MG/ML
2 INJECTION INTRAMUSCULAR ONCE
Status: COMPLETED | OUTPATIENT
Start: 2023-05-16 | End: 2023-05-16

## 2023-05-16 RX ORDER — VERAPAMIL HYDROCHLORIDE 120 MG/1
120 TABLET, FILM COATED, EXTENDED RELEASE ORAL DAILY
Status: DISCONTINUED | OUTPATIENT
Start: 2023-05-16 | End: 2023-05-19 | Stop reason: HOSPADM

## 2023-05-16 RX ORDER — HYDRALAZINE HYDROCHLORIDE 25 MG/1
50 TABLET, FILM COATED ORAL EVERY 8 HOURS
Status: DISCONTINUED | OUTPATIENT
Start: 2023-05-16 | End: 2023-05-19

## 2023-05-16 RX ORDER — VERAPAMIL HYDROCHLORIDE 120 MG/1
120 TABLET, FILM COATED, EXTENDED RELEASE ORAL ONCE
Status: COMPLETED | OUTPATIENT
Start: 2023-05-16 | End: 2023-05-16

## 2023-05-16 RX ADMIN — DEXTROSE AND SODIUM CHLORIDE: 5; 450 INJECTION, SOLUTION INTRAVENOUS at 03:05

## 2023-05-16 RX ADMIN — LABETALOL HYDROCHLORIDE 200 MG: 100 TABLET, FILM COATED ORAL at 08:05

## 2023-05-16 RX ADMIN — LORAZEPAM 2 MG: 2 INJECTION INTRAMUSCULAR; INTRAVENOUS at 01:05

## 2023-05-16 RX ADMIN — CLONIDINE HYDROCHLORIDE 0.1 MG: 0.1 TABLET ORAL at 08:05

## 2023-05-16 RX ADMIN — MUPIROCIN: 20 OINTMENT TOPICAL at 09:05

## 2023-05-16 RX ADMIN — HYDROMORPHONE HYDROCHLORIDE 1 MG: 1 INJECTION, SOLUTION INTRAMUSCULAR; INTRAVENOUS; SUBCUTANEOUS at 08:05

## 2023-05-16 RX ADMIN — HYDROMORPHONE HYDROCHLORIDE 1 MG: 1 INJECTION, SOLUTION INTRAMUSCULAR; INTRAVENOUS; SUBCUTANEOUS at 06:05

## 2023-05-16 RX ADMIN — VERAPAMIL HYDROCHLORIDE 120 MG: 120 TABLET, FILM COATED, EXTENDED RELEASE ORAL at 09:05

## 2023-05-16 RX ADMIN — HYDROMORPHONE HYDROCHLORIDE 1 MG: 1 INJECTION, SOLUTION INTRAMUSCULAR; INTRAVENOUS; SUBCUTANEOUS at 05:05

## 2023-05-16 RX ADMIN — HYDROMORPHONE HYDROCHLORIDE 1 MG: 1 INJECTION, SOLUTION INTRAMUSCULAR; INTRAVENOUS; SUBCUTANEOUS at 10:05

## 2023-05-16 RX ADMIN — LEVOTHYROXINE SODIUM 50 MCG: 50 TABLET ORAL at 05:05

## 2023-05-16 RX ADMIN — HYDROMORPHONE HYDROCHLORIDE 1 MG: 1 INJECTION, SOLUTION INTRAMUSCULAR; INTRAVENOUS; SUBCUTANEOUS at 12:05

## 2023-05-16 RX ADMIN — DEXTROSE AND SODIUM CHLORIDE: 5; 450 INJECTION, SOLUTION INTRAVENOUS at 10:05

## 2023-05-16 RX ADMIN — Medication 10 ML: at 08:05

## 2023-05-16 RX ADMIN — THIAMINE HCL TAB 100 MG 100 MG: 100 TAB at 08:05

## 2023-05-16 RX ADMIN — FOLIC ACID 1 MG: 1 TABLET ORAL at 08:05

## 2023-05-16 RX ADMIN — VERAPAMIL HYDROCHLORIDE 120 MG: 120 TABLET, FILM COATED, EXTENDED RELEASE ORAL at 01:05

## 2023-05-16 RX ADMIN — ESCITALOPRAM OXALATE 10 MG: 10 TABLET ORAL at 08:05

## 2023-05-16 RX ADMIN — LAMOTRIGINE 100 MG: 100 TABLET ORAL at 08:05

## 2023-05-16 RX ADMIN — MUPIROCIN: 20 OINTMENT TOPICAL at 08:05

## 2023-05-16 RX ADMIN — BUPROPION HYDROCHLORIDE 300 MG: 150 TABLET, FILM COATED, EXTENDED RELEASE ORAL at 08:05

## 2023-05-16 RX ADMIN — QUETIAPINE FUMARATE 100 MG: 100 TABLET ORAL at 08:05

## 2023-05-16 RX ADMIN — HYDROMORPHONE HYDROCHLORIDE 1 MG: 1 INJECTION, SOLUTION INTRAMUSCULAR; INTRAVENOUS; SUBCUTANEOUS at 03:05

## 2023-05-16 RX ADMIN — LAMOTRIGINE 50 MG: 25 TABLET ORAL at 08:05

## 2023-05-16 RX ADMIN — HYDRALAZINE HYDROCHLORIDE 50 MG: 25 TABLET, FILM COATED ORAL at 10:05

## 2023-05-16 NOTE — SUBJECTIVE & OBJECTIVE
Interval History: stomach feels better     Review of Systems   Constitutional:  Negative for activity change.   Respiratory:  Negative for apnea.    Gastrointestinal:  Positive for abdominal pain.   Genitourinary:  Negative for difficulty urinating.   Neurological:  Negative for dizziness.   Objective:     Vital Signs (Most Recent):  Temp: 98.1 °F (36.7 °C) (05/16/23 0700)  Pulse: (!) 127 (05/16/23 0700)  Resp: 16 (05/16/23 0858)  BP: (!) 185/139 (05/16/23 0700)  SpO2: 95 % (05/16/23 0700) Vital Signs (24h Range):  Temp:  [97.4 °F (36.3 °C)-98.7 °F (37.1 °C)] 98.1 °F (36.7 °C)  Pulse:  [] 127  Resp:  [14-22] 16  SpO2:  [94 %-98 %] 95 %  BP: (131-222)/() 185/139     Weight: 67.6 kg (149 lb)  Body mass index is 29.1 kg/m².    Intake/Output Summary (Last 24 hours) at 5/16/2023 0859  Last data filed at 5/15/2023 1854  Gross per 24 hour   Intake 1454 ml   Output 0 ml   Net 1454 ml         Physical Exam  Constitutional:       Appearance: She is obese. She is ill-appearing.   Pulmonary:      Effort: Pulmonary effort is normal. No respiratory distress.   Abdominal:      Tenderness: There is abdominal tenderness. There is no guarding or rebound.   Neurological:      Mental Status: She is alert and oriented to person, place, and time.           Significant Labs: All pertinent labs within the past 24 hours have been reviewed.  BMP:   Recent Labs   Lab 05/16/23  0559   *   *   K 3.8   CL 99   CO2 24   BUN 19   CREATININE 1.0   CALCIUM 9.6   MG 1.6     CBC:   Recent Labs   Lab 05/15/23  0451 05/16/23  0559   WBC 16.74* 19.63*   HGB 15.8 13.5   HCT 45.4 38.4    185       Significant Imaging:

## 2023-05-16 NOTE — NURSING
[facility]  Nurses Note -- 4 Eyes      5/16/2023       Skin assessed on: Q Shift      [x] No Pressure Injuries Present    Prevention Measures Documented    [] Yes LDA  for Pressure Injury Previously documented     [] Yes New Pressure Injury Discovered   [] LDA for New Pressure Injury Added      Attending RN:  Sherin Man RN     Second RN:  Torey

## 2023-05-16 NOTE — HOSPITAL COURSE
Patient admitted to the hospital for alcoholic pancreatitis. She clinically improved over the course of several days. She was tolerating her diet and was requesting discharge to home on 5/19. Activity as tolerated. Diet- regular. Follow up with PCP in one week.  Patient's CC has resolved.

## 2023-05-16 NOTE — PROGRESS NOTES
[facility] Medicine  Progress Note    Patient Name: Janis Reese  MRN: [id number]  Patient Class: IP- Inpatient   Admission Date: 5/15/2023  Length of Stay: 1 days  Attending Physician: Wilner Mclaughlin MD  Primary Care Provider: Rula Betancourt MD        Subjective:     Principal Problem:Acute pancreatitis        HPI:  42-year-old female presents with abdominal pain.  Past medical history significant for multiple sclerosis, hypertension and chronic pain on Suboxone.  She was admitted to hospital last month with acute pancreatitis secondary to alcohol.  Endorses using alcohol over the weekend for mother's Day.  She woke up this morning shortly prior to arrival with severe abdominal pain to the left upper quadrant with nausea and vomiting.  Pain radiates to her back.  She presents hypertensive and tachycardic and very uncomfortable secondary to pain.  She denies any fever or chills.  No alleviating or aggravating factors.  Work up consistent with acute pancreatitis.  No other complaints.      Overview/Hospital Course:  Patient admitted to the hospital for alcoholic pancreatitis.       Interval History: stomach feels better     Review of Systems   Constitutional:  Negative for activity change.   Respiratory:  Negative for apnea.    Gastrointestinal:  Positive for abdominal pain.   Genitourinary:  Negative for difficulty urinating.   Neurological:  Negative for dizziness.   Objective:     Vital Signs (Most Recent):  Temp: 98.1 °F (36.7 °C) (05/16/23 0700)  Pulse: (!) 127 (05/16/23 0700)  Resp: 16 (05/16/23 0858)  BP: (!) 185/139 (05/16/23 0700)  SpO2: 95 % (05/16/23 0700) Vital Signs (24h Range):  Temp:  [97.4 °F (36.3 °C)-98.7 °F (37.1 °C)] 98.1 °F (36.7 °C)  Pulse:  [] 127  Resp:  [14-22] 16  SpO2:  [94 %-98 %] 95 %  BP: (131-222)/() 185/139     Weight: 67.6 kg (149 lb)  Body mass index is 29.1 kg/m².    Intake/Output Summary (Last 24 hours) at 5/16/2023 0859  Last data  filed at 5/15/2023 1854  Gross per 24 hour   Intake 1454 ml   Output 0 ml   Net 1454 ml         Physical Exam  Constitutional:       Appearance: She is obese. She is ill-appearing.   Pulmonary:      Effort: Pulmonary effort is normal. No respiratory distress.   Abdominal:      Tenderness: There is abdominal tenderness. There is no guarding or rebound.   Neurological:      Mental Status: She is alert and oriented to person, place, and time.           Significant Labs: All pertinent labs within the past 24 hours have been reviewed.  BMP:   Recent Labs   Lab 05/16/23  0559   *   *   K 3.8   CL 99   CO2 24   BUN 19   CREATININE 1.0   CALCIUM 9.6   MG 1.6     CBC:   Recent Labs   Lab 05/15/23  0451 05/16/23  0559   WBC 16.74* 19.63*   HGB 15.8 13.5   HCT 45.4 38.4    185       Significant Imaging:       Assessment/Plan:      * Acute pancreatitis  Recurrent pancreatitis secondary to ETOH abuse.  Place NPO.  IVF hydration.  Anti emetics and prn pain medications.  Leukocytosis.  Will check CT of abdomen.  Continue supportive care.      IV fluids. NPO today. Clinically doing better.    Alcohol abuse  Counseled on importance of cessation as leading to recurrent pancreatitis.  MVI, Thiamine, Folate.  Ativan prn withdrawal symptoms.      Chronic pain  Probably related to MS.  On home Suboxone.  Currently in severe pain secondary to acute pancreatitis.  Treat acute pain and restart Suboxone upon discharge.      Hypercalcemia  Probably related to fluid depletion.  IVF's and recheck labs in Am.  Also check PTH in Am.      Leukocytosis  Probably reactive and secondary to acute issues.  Afebrile.  Will monitor off ABx's for now.      Bipolar 1 disorder  Stable  Resume home medications.      Essential hypertension  Uncontrolled and probably exacerbated by pain.  Resume home medications with parameters.  Add prn Clonidine.      Multiple sclerosis  Outpatient follow up.      Hypercalcemia- lab error?     Obesity  Body mass index is 29.1 kg/m². weight loss as out patient   VTE Risk Mitigation (From admission, onward)         Ordered     Place DAVID hose  Until discontinued         05/15/23 0647     IP VTE LOW RISK PATIENT  Once         05/15/23 0647     Place sequential compression device  Until discontinued         05/15/23 0647                Discharge Planning   ROSA: 5/15/2023     Code Status: Full Code   Is the patient medically ready for discharge?:     Reason for patient still in hospital (select all that apply): Patient unstable                     Wilner Clark MD  Department of Hospital Medicine   [facility]

## 2023-05-16 NOTE — NURSING
O2 sat 91%, patient encouraged to turn, cough, and take deep breaths.  Patient remains at 91%.  Placed on 1l/m, O2 sats improved to 92-93%

## 2023-05-16 NOTE — NURSING
Patient's blood pressure still elevated. Notified Dr. Tapia at this time. New orders given for hydralazine 100mg po. Will administer medication and recheck blood pressure after administration.

## 2023-05-16 NOTE — NURSING
Patient's blood pressure elevated. Notified Dr. Tapia and rapid response nurse Penny Bradshaw Rn. Awaiting orders.

## 2023-05-16 NOTE — PLAN OF CARE
Case Management Assessment     PCP: Rula Betancourt  Pharmacy: [facility], [place]     Patient Arrived From: Home  Existing Help at Home: Lane Reese    Barriers to Discharge: None    Discharge Plan:    A. Discharge to home    B.               05/16/23 1434   Discharge Assessment   Assessment Type Discharge Planning Assessment   Confirmed/corrected address, phone number and insurance Yes   Confirmed Demographics Correct on Facesheet   People in Home spouse   Do you expect to return to your current living situation? Yes   Do you have help at home or someone to help you manage your care at home? Yes   Who are your caregiver(s) and their phone number(s)? Lane Reese [phone]   Walking or Climbing Stairs ambulation difficulty, requires equipment   Mobility Management Wheel Chair and rolling walker   Home Accessibility wheelchair accessible   Who is going to help you get home at discharge? Lane Reese (spouse)   Are you on dialysis? No   Do you take coumadin? No   Discharge Plan A Home with family   Discharge Plan discussed with: Patient     SW spoke with patient at bedside in regards to discharge planning. Patient will go home with spouse at discharge. Patient receives PT 2x weekly but cant think of the company at this time. Preferred pharmacy, [facility] in [place], [place].

## 2023-05-16 NOTE — NURSING
Blood pressure recheck done 104/70.   at the bedside.  Patient reports huge improvement in pain.  No distress noted.

## 2023-05-16 NOTE — NURSING
[facility] [place]  Nurses Note -- 4 Eyes      5/15/23         Skin assessed on: Q Shift      [x] No Pressure Injuries Present    []Prevention Measures Documented    [] Yes LDA  for Pressure Injury Previously documented     [] Yes New Pressure Injury Discovered   [] LDA for New Pressure Injury Added      Attending RN:  Torey Vora RN     Second RN:  Sherin Man Rn

## 2023-05-16 NOTE — NURSING
RAPID RESPONSE NURSE PROACTIVE ROUNDING NOTE       Time of Visit:     Admit Date: 5/15/2023  LOS: 1  Code Status: Full Code   Date of Visit: 2023  : 1980  Age: 42 y.o.  Sex: female  Race: White  Bed: W423/W423 A:   MRN: [id number]  Was the patient discharged from an ICU this admission? No   Was the patient discharged from a PACU within last 24 hours? No   Did the patient receive conscious sedation/general anesthesia in last 24 hours? No   Was the patient in the ED within the past 24 hours? Yes   Was the patient on NIPPV within the past 24 hours? No   Attending Physician: Chad Mercado MD  Primary Service: Hospitalist   Time spent at the bedside: < 15 min    SITUATION    Notified by bedside RN via phone call  Reason for alert: High blood pressure unresponsive to medications already given    Diagnosis: Acute pancreatitis   has a past medical history of Depression, Hypertension, and Multiple sclerosis.    Last Vitals:  Temp: 98.7 °F (37.1 °C) (05/15 2322)  Pulse: 114 (05/15 2322)  Resp: 17 (05/15 2323)  BP: 212/129 (05/15 2322)  SpO2: 98 % (05/15 2322)    24 Hour Vitals Range:  Temp:  [97.4 °F (36.3 °C)-98.7 °F (37.1 °C)]   Pulse:  []   Resp:  [14-22]   BP: (142-213)/()   SpO2:  [95 %-98 %]     Clinical Issues: Circulatory    ASSESSMENT/INTERVENTIONS    Primary RN called for prn blood pressure med, pain med given    RECOMMENDATIONS  Reassess blood pressure    Discussed plan of care with bedside RNJanine    PROVIDER ESCALATION    Physician escalation: Yes, via primary RN    Orders received and case discussed with Dr. Tapia .    Disposition:Remain in room 423    FOLLOW UP    Call back the Rapid Response NursePenny at [phone] for additional questions or concerns.

## 2023-05-16 NOTE — ASSESSMENT & PLAN NOTE
Recurrent pancreatitis secondary to ETOH abuse.  Place NPO.  IVF hydration.  Anti emetics and prn pain medications.  Leukocytosis.  Will check CT of abdomen.  Continue supportive care.      IV fluids. NPO today. Clinically doing better.

## 2023-05-17 LAB
ALBUMIN SERPL BCP-MCNC: 2.8 G/DL (ref 3.5–5.2)
ALP SERPL-CCNC: 62 U/L (ref 55–135)
ALT SERPL W/O P-5'-P-CCNC: 23 U/L (ref 10–44)
ANION GAP SERPL CALC-SCNC: 7 MMOL/L (ref 8–16)
AST SERPL-CCNC: 19 U/L (ref 10–40)
BASOPHILS # BLD AUTO: 0.01 K/UL (ref 0–0.2)
BASOPHILS NFR BLD: 0.1 % (ref 0–1.9)
BILIRUB SERPL-MCNC: 0.4 MG/DL (ref 0.1–1)
BUN SERPL-MCNC: 12 MG/DL (ref 6–20)
CALCIUM SERPL-MCNC: 8.6 MG/DL (ref 8.7–10.5)
CHLORIDE SERPL-SCNC: 106 MMOL/L (ref 95–110)
CO2 SERPL-SCNC: 23 MMOL/L (ref 23–29)
CREAT SERPL-MCNC: 0.8 MG/DL (ref 0.5–1.4)
DIFFERENTIAL METHOD: ABNORMAL
EOSINOPHIL # BLD AUTO: 0.2 K/UL (ref 0–0.5)
EOSINOPHIL NFR BLD: 2.1 % (ref 0–8)
ERYTHROCYTE [DISTWIDTH] IN BLOOD BY AUTOMATED COUNT: 11.9 % (ref 11.5–14.5)
EST. GFR  (NO RACE VARIABLE): >60 ML/MIN/1.73 M^2
GLUCOSE SERPL-MCNC: 152 MG/DL (ref 70–110)
HCT VFR BLD AUTO: 29.1 % (ref 37–48.5)
HGB BLD-MCNC: 9.9 G/DL (ref 12–16)
IMM GRANULOCYTES # BLD AUTO: 0.04 K/UL (ref 0–0.04)
IMM GRANULOCYTES NFR BLD AUTO: 0.5 % (ref 0–0.5)
LYMPHOCYTES # BLD AUTO: 0.8 K/UL (ref 1–4.8)
LYMPHOCYTES NFR BLD: 8.8 % (ref 18–48)
MAGNESIUM SERPL-MCNC: 1.7 MG/DL (ref 1.6–2.6)
MCH RBC QN AUTO: 31.3 PG (ref 27–31)
MCHC RBC AUTO-ENTMCNC: 34 G/DL (ref 32–36)
MCV RBC AUTO: 92 FL (ref 82–98)
MONOCYTES # BLD AUTO: 0.4 K/UL (ref 0.3–1)
MONOCYTES NFR BLD: 5.1 % (ref 4–15)
NEUTROPHILS # BLD AUTO: 7.1 K/UL (ref 1.8–7.7)
NEUTROPHILS NFR BLD: 83.4 % (ref 38–73)
NRBC BLD-RTO: 0 /100 WBC
PHOSPHATE SERPL-MCNC: 1.8 MG/DL (ref 2.7–4.5)
PLATELET # BLD AUTO: 79 K/UL (ref 150–450)
PMV BLD AUTO: 11.7 FL (ref 9.2–12.9)
POTASSIUM SERPL-SCNC: 3.2 MMOL/L (ref 3.5–5.1)
PROT SERPL-MCNC: 5.2 G/DL (ref 6–8.4)
RBC # BLD AUTO: 3.16 M/UL (ref 4–5.4)
SODIUM SERPL-SCNC: 136 MMOL/L (ref 136–145)
WBC # BLD AUTO: 8.56 K/UL (ref 3.9–12.7)

## 2023-05-17 PROCEDURE — 25000003 PHARM REV CODE 250

## 2023-05-17 PROCEDURE — 85025 COMPLETE CBC W/AUTO DIFF WBC: CPT | Performed by: HOSPITALIST

## 2023-05-17 PROCEDURE — 11000001 HC ACUTE MED/SURG PRIVATE ROOM

## 2023-05-17 PROCEDURE — 25000003 PHARM REV CODE 250: Performed by: INTERNAL MEDICINE

## 2023-05-17 PROCEDURE — 25000003 PHARM REV CODE 250: Performed by: HOSPITALIST

## 2023-05-17 PROCEDURE — 84100 ASSAY OF PHOSPHORUS: CPT | Performed by: HOSPITALIST

## 2023-05-17 PROCEDURE — 83735 ASSAY OF MAGNESIUM: CPT | Performed by: HOSPITALIST

## 2023-05-17 PROCEDURE — S5010 5% DEXTROSE AND 0.45% SALINE: HCPCS | Performed by: HOSPITALIST

## 2023-05-17 PROCEDURE — 80053 COMPREHEN METABOLIC PANEL: CPT | Performed by: HOSPITALIST

## 2023-05-17 PROCEDURE — 63600175 PHARM REV CODE 636 W HCPCS: Performed by: STUDENT IN AN ORGANIZED HEALTH CARE EDUCATION/TRAINING PROGRAM

## 2023-05-17 RX ORDER — HYDROXYZINE HYDROCHLORIDE 25 MG/1
25 TABLET, FILM COATED ORAL 3 TIMES DAILY PRN
Status: DISCONTINUED | OUTPATIENT
Start: 2023-05-17 | End: 2023-05-17

## 2023-05-17 RX ORDER — POTASSIUM CHLORIDE 20 MEQ/1
60 TABLET, EXTENDED RELEASE ORAL ONCE
Status: COMPLETED | OUTPATIENT
Start: 2023-05-17 | End: 2023-05-17

## 2023-05-17 RX ORDER — MAG HYDROX/ALUMINUM HYD/SIMETH 200-200-20
30 SUSPENSION, ORAL (FINAL DOSE FORM) ORAL EVERY 4 HOURS PRN
Status: DISCONTINUED | OUTPATIENT
Start: 2023-05-17 | End: 2023-05-19 | Stop reason: HOSPADM

## 2023-05-17 RX ORDER — MAG HYDROX/ALUMINUM HYD/SIMETH 200-200-20
30 SUSPENSION, ORAL (FINAL DOSE FORM) ORAL
Status: DISCONTINUED | OUTPATIENT
Start: 2023-05-17 | End: 2023-05-17

## 2023-05-17 RX ORDER — HYDROXYZINE HYDROCHLORIDE 25 MG/1
25 TABLET, FILM COATED ORAL 3 TIMES DAILY PRN
Status: DISCONTINUED | OUTPATIENT
Start: 2023-05-17 | End: 2023-05-19 | Stop reason: HOSPADM

## 2023-05-17 RX ADMIN — LABETALOL HYDROCHLORIDE 200 MG: 100 TABLET, FILM COATED ORAL at 09:05

## 2023-05-17 RX ADMIN — HYDROMORPHONE HYDROCHLORIDE 1 MG: 1 INJECTION, SOLUTION INTRAMUSCULAR; INTRAVENOUS; SUBCUTANEOUS at 12:05

## 2023-05-17 RX ADMIN — HYDROMORPHONE HYDROCHLORIDE 1 MG: 1 INJECTION, SOLUTION INTRAMUSCULAR; INTRAVENOUS; SUBCUTANEOUS at 01:05

## 2023-05-17 RX ADMIN — DEXTROSE AND SODIUM CHLORIDE: 5; 450 INJECTION, SOLUTION INTRAVENOUS at 09:05

## 2023-05-17 RX ADMIN — LEVOTHYROXINE SODIUM 50 MCG: 50 TABLET ORAL at 05:05

## 2023-05-17 RX ADMIN — HYDROMORPHONE HYDROCHLORIDE 1 MG: 1 INJECTION, SOLUTION INTRAMUSCULAR; INTRAVENOUS; SUBCUTANEOUS at 07:05

## 2023-05-17 RX ADMIN — ALUMINUM HYDROXIDE, MAGNESIUM HYDROXIDE, AND DIMETHICONE 30 ML: 200; 20; 200 SUSPENSION ORAL at 05:05

## 2023-05-17 RX ADMIN — POTASSIUM CHLORIDE 60 MEQ: 1500 TABLET, EXTENDED RELEASE ORAL at 09:05

## 2023-05-17 RX ADMIN — LAMOTRIGINE 100 MG: 100 TABLET ORAL at 09:05

## 2023-05-17 RX ADMIN — HYDRALAZINE HYDROCHLORIDE 50 MG: 25 TABLET, FILM COATED ORAL at 05:05

## 2023-05-17 RX ADMIN — VERAPAMIL HYDROCHLORIDE 120 MG: 120 TABLET, FILM COATED, EXTENDED RELEASE ORAL at 09:05

## 2023-05-17 RX ADMIN — HYDROMORPHONE HYDROCHLORIDE 1 MG: 1 INJECTION, SOLUTION INTRAMUSCULAR; INTRAVENOUS; SUBCUTANEOUS at 04:05

## 2023-05-17 RX ADMIN — DEXTROSE AND SODIUM CHLORIDE: 5; 450 INJECTION, SOLUTION INTRAVENOUS at 06:05

## 2023-05-17 RX ADMIN — HYDROXYZINE HYDROCHLORIDE 25 MG: 25 TABLET ORAL at 06:05

## 2023-05-17 RX ADMIN — ESCITALOPRAM OXALATE 10 MG: 10 TABLET ORAL at 09:05

## 2023-05-17 RX ADMIN — LAMOTRIGINE 50 MG: 25 TABLET ORAL at 09:05

## 2023-05-17 RX ADMIN — ACETAMINOPHEN 650 MG: 325 TABLET ORAL at 09:05

## 2023-05-17 RX ADMIN — DEXTROSE AND SODIUM CHLORIDE: 5; 450 INJECTION, SOLUTION INTRAVENOUS at 01:05

## 2023-05-17 RX ADMIN — MUPIROCIN: 20 OINTMENT TOPICAL at 09:05

## 2023-05-17 RX ADMIN — HYDROMORPHONE HYDROCHLORIDE 1 MG: 1 INJECTION, SOLUTION INTRAMUSCULAR; INTRAVENOUS; SUBCUTANEOUS at 10:05

## 2023-05-17 RX ADMIN — TIZANIDINE 4 MG: 4 TABLET ORAL at 03:05

## 2023-05-17 RX ADMIN — THIAMINE HCL TAB 100 MG 100 MG: 100 TAB at 09:05

## 2023-05-17 RX ADMIN — FOLIC ACID 1 MG: 1 TABLET ORAL at 09:05

## 2023-05-17 RX ADMIN — Medication 10 ML: at 09:05

## 2023-05-17 RX ADMIN — HYDRALAZINE HYDROCHLORIDE 50 MG: 25 TABLET, FILM COATED ORAL at 09:05

## 2023-05-17 RX ADMIN — QUETIAPINE FUMARATE 100 MG: 100 TABLET ORAL at 09:05

## 2023-05-17 RX ADMIN — BUPROPION HYDROCHLORIDE 300 MG: 150 TABLET, FILM COATED, EXTENDED RELEASE ORAL at 09:05

## 2023-05-17 NOTE — SUBJECTIVE & OBJECTIVE
Interval History: No new issues. Feels better. Would like to advance diet       Review of Systems   Constitutional:  Negative for activity change.   Respiratory:  Negative for apnea.    Gastrointestinal:  Negative for abdominal pain.   Genitourinary:  Negative for difficulty urinating.   Neurological:  Negative for dizziness.   Objective:     Vital Signs (Most Recent):  Temp: 97.9 °F (36.6 °C) (05/17/23 0659)  Pulse: 71 (05/17/23 0659)  Resp: 17 (05/17/23 0702)  BP: 128/70 (05/17/23 0659)  SpO2: 100 % (05/17/23 0659) Vital Signs (24h Range):  Temp:  [97.7 °F (36.5 °C)-98.1 °F (36.7 °C)] 97.9 °F (36.6 °C)  Pulse:  [71-97] 71  Resp:  [16-18] 17  SpO2:  [91 %-100 %] 100 %  BP: (104-136)/(60-83) 128/70     Weight: 67.6 kg (149 lb)  Body mass index is 29.1 kg/m².    Intake/Output Summary (Last 24 hours) at 5/17/2023 0845  Last data filed at 5/17/2023 0620  Gross per 24 hour   Intake 2744.49 ml   Output 650 ml   Net 2094.49 ml         Physical Exam  Constitutional:       Appearance: She is obese. She is ill-appearing.   Pulmonary:      Effort: Pulmonary effort is normal. No respiratory distress.   Abdominal:      Tenderness: There is abdominal tenderness. There is no guarding or rebound.   Neurological:      Mental Status: She is alert and oriented to person, place, and time.           Significant Labs: All pertinent labs within the past 24 hours have been reviewed.  BMP:   Recent Labs   Lab 05/17/23  0410   *      K 3.2*      CO2 23   BUN 12   CREATININE 0.8   CALCIUM 8.6*   MG 1.7     CBC:   Recent Labs   Lab 05/16/23  0559 05/17/23  0410   WBC 19.63* 8.56   HGB 13.5 9.9*   HCT 38.4 29.1*    79*       Significant Imaging:

## 2023-05-17 NOTE — PROGRESS NOTES
Pt c/o pain 10/10 prn pain meds to be given.  @ bs. Instructed both on NPO status. No distress noted. Will continue to monitor.

## 2023-05-17 NOTE — NURSING
[facility] [place]  Nurses Note -- 4 Eyes      5/16/23       Skin assessed on: Q Shift      [x] No Pressure Injuries Present    [x]Prevention Measures Documented    [] Yes LDA  for Pressure Injury Previously documented     [] Yes New Pressure Injury Discovered   [] LDA for New Pressure Injury Added      Attending RN:  Torey Vora RN     Second RN:  Sherin Man Rn

## 2023-05-17 NOTE — PROGRESS NOTES
[facility] Medicine  Progress Note    Patient Name: Janis Reese  MRN: [id number]  Patient Class: IP- Inpatient   Admission Date: 5/15/2023  Length of Stay: 2 days  Attending Physician: Wilner Mclaughlin MD  Primary Care Provider: Rula Betancourt MD        Subjective:     Principal Problem:Acute pancreatitis        HPI:  42-year-old female presents with abdominal pain.  Past medical history significant for multiple sclerosis, hypertension and chronic pain on Suboxone.  She was admitted to hospital last month with acute pancreatitis secondary to alcohol.  Endorses using alcohol over the weekend for mother's Day.  She woke up this morning shortly prior to arrival with severe abdominal pain to the left upper quadrant with nausea and vomiting.  Pain radiates to her back.  She presents hypertensive and tachycardic and very uncomfortable secondary to pain.  She denies any fever or chills.  No alleviating or aggravating factors.  Work up consistent with acute pancreatitis.  No other complaints.      Overview/Hospital Course:  Patient admitted to the hospital for alcoholic pancreatitis. She clinically improved over the course of several days       Interval History: No new issues. Feels better. Would like to advance diet       Review of Systems   Constitutional:  Negative for activity change.   Respiratory:  Negative for apnea.    Gastrointestinal:  Negative for abdominal pain.   Genitourinary:  Negative for difficulty urinating.   Neurological:  Negative for dizziness.   Objective:     Vital Signs (Most Recent):  Temp: 97.9 °F (36.6 °C) (05/17/23 0659)  Pulse: 71 (05/17/23 0659)  Resp: 17 (05/17/23 0702)  BP: 128/70 (05/17/23 0659)  SpO2: 100 % (05/17/23 0659) Vital Signs (24h Range):  Temp:  [97.7 °F (36.5 °C)-98.1 °F (36.7 °C)] 97.9 °F (36.6 °C)  Pulse:  [71-97] 71  Resp:  [16-18] 17  SpO2:  [91 %-100 %] 100 %  BP: (104-136)/(60-83) 128/70     Weight: 67.6 kg (149 lb)  Body mass index is  29.1 kg/m².    Intake/Output Summary (Last 24 hours) at 5/17/2023 0845  Last data filed at 5/17/2023 0620  Gross per 24 hour   Intake 2744.49 ml   Output 650 ml   Net 2094.49 ml         Physical Exam  Constitutional:       Appearance: She is obese. She is ill-appearing.   Pulmonary:      Effort: Pulmonary effort is normal. No respiratory distress.   Abdominal:      Tenderness: There is abdominal tenderness. There is no guarding or rebound.   Neurological:      Mental Status: She is alert and oriented to person, place, and time.           Significant Labs: All pertinent labs within the past 24 hours have been reviewed.  BMP:   Recent Labs   Lab 05/17/23  0410   *      K 3.2*      CO2 23   BUN 12   CREATININE 0.8   CALCIUM 8.6*   MG 1.7     CBC:   Recent Labs   Lab 05/16/23  0559 05/17/23  0410   WBC 19.63* 8.56   HGB 13.5 9.9*   HCT 38.4 29.1*    79*       Significant Imaging:       Assessment/Plan:      * Acute pancreatitis  Recurrent pancreatitis secondary to ETOH abuse.  Place NPO.  IVF hydration.  Anti emetics and prn pain medications.  Leukocytosis.  Will check CT of abdomen.  Continue supportive care.      IV fluids. NPO today. Clinically doing better.  Clear liquids     Alcohol abuse  Counseled on importance of cessation as leading to recurrent pancreatitis.  MVI, Thiamine, Folate.  Ativan prn withdrawal symptoms.      Chronic pain  Probably related to MS.  On home Suboxone.  Currently in severe pain secondary to acute pancreatitis.  Treat acute pain and restart Suboxone upon discharge.      Hypercalcemia  Probably related to fluid depletion.  IVF's and recheck labs in Am.  Also check PTH in Am.      Leukocytosis  Probably reactive and secondary to acute issues.  Afebrile.  Will monitor off ABx's for now.      Bipolar 1 disorder  Stable  Resume home medications.      Essential hypertension  Uncontrolled and probably exacerbated by pain.  Resume home medications with parameters.  Add  prn Clonidine.      Multiple sclerosis  Outpatient follow up.      Hypokalemia- replacing     VTE Risk Mitigation (From admission, onward)           Ordered     Place DAVID hose  Until discontinued         05/15/23 0647     IP VTE LOW RISK PATIENT  Once         05/15/23 0647     Place sequential compression device  Until discontinued         05/15/23 0647                    Discharge Planning   ROSA: 5/15/2023     Code Status: Full Code   Is the patient medically ready for discharge?:     Reason for patient still in hospital (select all that apply): Patient unstable  Discharge Plan A: Home with family                  Wilner Clark MD  Department of Hospital Medicine   [facility]

## 2023-05-17 NOTE — PLAN OF CARE
Patient alert and oriented x4. Respirations even and unlabored. 02 at 1l. No distress noted. Skin warm and dry. Port to right chest.iv fluids infusing with no complications noted. Patient complains of pain to abdomen. Pain medication given as needed. purewick in place. No complications noted. shantell hose/scds to ble. Patient has no complaints at this time. Instructed to call  Problem: Adult Inpatient Plan of Care  Goal: Plan of Care Review  Outcome: Ongoing, Progressing  Flowsheets (Taken 5/17/2023 0628)  Plan of Care Reviewed With: patient  Goal: Patient-Specific Goal (Individualized)  Outcome: Ongoing, Progressing  Goal: Absence of Hospital-Acquired Illness or Injury  Outcome: Ongoing, Progressing  Intervention: Identify and Manage Fall Risk  Flowsheets (Taken 5/17/2023 0634)  Safety Promotion/Fall Prevention:   assistive device/personal item within reach   high risk medications identified     Problem: Skin Injury Risk Increased  Goal: Skin Health and Integrity  Outcome: Ongoing, Progressing  Intervention: Optimize Skin Protection  Flowsheets (Taken 5/17/2023 0634)  Pressure Reduction Techniques: frequent weight shift encouraged  Head of Bed (HOB) Positioning: HOB elevated

## 2023-05-17 NOTE — ASSESSMENT & PLAN NOTE
Recurrent pancreatitis secondary to ETOH abuse.  Place NPO.  IVF hydration.  Anti emetics and prn pain medications.  Leukocytosis.  Will check CT of abdomen.  Continue supportive care.      IV fluids. NPO today. Clinically doing better.  Clear liquids

## 2023-05-17 NOTE — PROGRESS NOTES
"Pt states she drank 1 apple juice "too fast" and is now having sharp pains to the abdomen. Prn pain meds given.   "

## 2023-05-17 NOTE — NURSING
Patient crying. Stating that iv machine made her tense up. Patient requesting ativan. Parameters not met to administer prn ativan. Notified Provider for orders. New orders placed.

## 2023-05-18 LAB
ALBUMIN SERPL BCP-MCNC: 2.7 G/DL (ref 3.5–5.2)
ALP SERPL-CCNC: 75 U/L (ref 55–135)
ALT SERPL W/O P-5'-P-CCNC: 22 U/L (ref 10–44)
ANION GAP SERPL CALC-SCNC: 8 MMOL/L (ref 8–16)
AST SERPL-CCNC: 22 U/L (ref 10–40)
BASOPHILS # BLD AUTO: 0.02 K/UL (ref 0–0.2)
BASOPHILS NFR BLD: 0.3 % (ref 0–1.9)
BILIRUB SERPL-MCNC: 0.3 MG/DL (ref 0.1–1)
BUN SERPL-MCNC: 7 MG/DL (ref 6–20)
CALCIUM SERPL-MCNC: 8.5 MG/DL (ref 8.7–10.5)
CHLORIDE SERPL-SCNC: 111 MMOL/L (ref 95–110)
CO2 SERPL-SCNC: 20 MMOL/L (ref 23–29)
CREAT SERPL-MCNC: 0.7 MG/DL (ref 0.5–1.4)
DIFFERENTIAL METHOD: ABNORMAL
EOSINOPHIL # BLD AUTO: 0.2 K/UL (ref 0–0.5)
EOSINOPHIL NFR BLD: 2.6 % (ref 0–8)
ERYTHROCYTE [DISTWIDTH] IN BLOOD BY AUTOMATED COUNT: 12 % (ref 11.5–14.5)
EST. GFR  (NO RACE VARIABLE): >60 ML/MIN/1.73 M^2
GLUCOSE SERPL-MCNC: 139 MG/DL (ref 70–110)
HCT VFR BLD AUTO: 27.9 % (ref 37–48.5)
HGB BLD-MCNC: 9.4 G/DL (ref 12–16)
IMM GRANULOCYTES # BLD AUTO: 0.07 K/UL (ref 0–0.04)
IMM GRANULOCYTES NFR BLD AUTO: 1 % (ref 0–0.5)
LYMPHOCYTES # BLD AUTO: 0.8 K/UL (ref 1–4.8)
LYMPHOCYTES NFR BLD: 11.8 % (ref 18–48)
MAGNESIUM SERPL-MCNC: 1.9 MG/DL (ref 1.6–2.6)
MCH RBC QN AUTO: 31.6 PG (ref 27–31)
MCHC RBC AUTO-ENTMCNC: 33.7 G/DL (ref 32–36)
MCV RBC AUTO: 94 FL (ref 82–98)
MONOCYTES # BLD AUTO: 0.5 K/UL (ref 0.3–1)
MONOCYTES NFR BLD: 6.8 % (ref 4–15)
NEUTROPHILS # BLD AUTO: 5.4 K/UL (ref 1.8–7.7)
NEUTROPHILS NFR BLD: 77.5 % (ref 38–73)
NRBC BLD-RTO: 0 /100 WBC
PHOSPHATE SERPL-MCNC: 1.9 MG/DL (ref 2.7–4.5)
PLATELET # BLD AUTO: 96 K/UL (ref 150–450)
PMV BLD AUTO: 12.2 FL (ref 9.2–12.9)
POTASSIUM SERPL-SCNC: 3.8 MMOL/L (ref 3.5–5.1)
PROT SERPL-MCNC: 5.5 G/DL (ref 6–8.4)
RBC # BLD AUTO: 2.97 M/UL (ref 4–5.4)
SODIUM SERPL-SCNC: 139 MMOL/L (ref 136–145)
WBC # BLD AUTO: 7.02 K/UL (ref 3.9–12.7)

## 2023-05-18 PROCEDURE — 83735 ASSAY OF MAGNESIUM: CPT | Performed by: HOSPITALIST

## 2023-05-18 PROCEDURE — 85025 COMPLETE CBC W/AUTO DIFF WBC: CPT | Performed by: HOSPITALIST

## 2023-05-18 PROCEDURE — 25000003 PHARM REV CODE 250: Performed by: INTERNAL MEDICINE

## 2023-05-18 PROCEDURE — S5010 5% DEXTROSE AND 0.45% SALINE: HCPCS | Performed by: HOSPITALIST

## 2023-05-18 PROCEDURE — 80053 COMPREHEN METABOLIC PANEL: CPT | Performed by: HOSPITALIST

## 2023-05-18 PROCEDURE — 25000003 PHARM REV CODE 250: Performed by: HOSPITALIST

## 2023-05-18 PROCEDURE — 11000001 HC ACUTE MED/SURG PRIVATE ROOM

## 2023-05-18 PROCEDURE — 84100 ASSAY OF PHOSPHORUS: CPT | Performed by: HOSPITALIST

## 2023-05-18 PROCEDURE — 63600175 PHARM REV CODE 636 W HCPCS: Performed by: STUDENT IN AN ORGANIZED HEALTH CARE EDUCATION/TRAINING PROGRAM

## 2023-05-18 RX ORDER — SODIUM,POTASSIUM PHOSPHATES 280-250MG
1 POWDER IN PACKET (EA) ORAL ONCE
Status: COMPLETED | OUTPATIENT
Start: 2023-05-18 | End: 2023-05-18

## 2023-05-18 RX ADMIN — HYDROMORPHONE HYDROCHLORIDE 1 MG: 1 INJECTION, SOLUTION INTRAMUSCULAR; INTRAVENOUS; SUBCUTANEOUS at 08:05

## 2023-05-18 RX ADMIN — THIAMINE HCL TAB 100 MG 100 MG: 100 TAB at 09:05

## 2023-05-18 RX ADMIN — HYDROMORPHONE HYDROCHLORIDE 1 MG: 1 INJECTION, SOLUTION INTRAMUSCULAR; INTRAVENOUS; SUBCUTANEOUS at 06:05

## 2023-05-18 RX ADMIN — DEXTROSE AND SODIUM CHLORIDE: 5; 450 INJECTION, SOLUTION INTRAVENOUS at 02:05

## 2023-05-18 RX ADMIN — LEVOTHYROXINE SODIUM 50 MCG: 50 TABLET ORAL at 05:05

## 2023-05-18 RX ADMIN — BUPROPION HYDROCHLORIDE 300 MG: 150 TABLET, FILM COATED, EXTENDED RELEASE ORAL at 09:05

## 2023-05-18 RX ADMIN — HYDROMORPHONE HYDROCHLORIDE 1 MG: 1 INJECTION, SOLUTION INTRAMUSCULAR; INTRAVENOUS; SUBCUTANEOUS at 05:05

## 2023-05-18 RX ADMIN — ESCITALOPRAM OXALATE 10 MG: 10 TABLET ORAL at 09:05

## 2023-05-18 RX ADMIN — Medication 1 PACKET: at 09:05

## 2023-05-18 RX ADMIN — DEXTROSE AND SODIUM CHLORIDE: 5; 450 INJECTION, SOLUTION INTRAVENOUS at 09:05

## 2023-05-18 RX ADMIN — LABETALOL HYDROCHLORIDE 200 MG: 100 TABLET, FILM COATED ORAL at 09:05

## 2023-05-18 RX ADMIN — LAMOTRIGINE 50 MG: 25 TABLET ORAL at 09:05

## 2023-05-18 RX ADMIN — QUETIAPINE FUMARATE 100 MG: 100 TABLET ORAL at 09:05

## 2023-05-18 RX ADMIN — DEXTROSE AND SODIUM CHLORIDE: 5; 450 INJECTION, SOLUTION INTRAVENOUS at 05:05

## 2023-05-18 RX ADMIN — VERAPAMIL HYDROCHLORIDE 120 MG: 120 TABLET, FILM COATED, EXTENDED RELEASE ORAL at 09:05

## 2023-05-18 RX ADMIN — HYDROMORPHONE HYDROCHLORIDE 1 MG: 1 INJECTION, SOLUTION INTRAMUSCULAR; INTRAVENOUS; SUBCUTANEOUS at 10:05

## 2023-05-18 RX ADMIN — Medication 10 ML: at 09:05

## 2023-05-18 RX ADMIN — HYDROMORPHONE HYDROCHLORIDE 1 MG: 1 INJECTION, SOLUTION INTRAMUSCULAR; INTRAVENOUS; SUBCUTANEOUS at 02:05

## 2023-05-18 RX ADMIN — FOLIC ACID 1 MG: 1 TABLET ORAL at 09:05

## 2023-05-18 RX ADMIN — MUPIROCIN: 20 OINTMENT TOPICAL at 09:05

## 2023-05-18 RX ADMIN — LAMOTRIGINE 100 MG: 100 TABLET ORAL at 09:05

## 2023-05-18 RX ADMIN — HYDRALAZINE HYDROCHLORIDE 50 MG: 25 TABLET, FILM COATED ORAL at 09:05

## 2023-05-18 RX ADMIN — TIZANIDINE 4 MG: 4 TABLET ORAL at 09:05

## 2023-05-18 NOTE — PROGRESS NOTES
[facility] Medicine  Progress Note    Patient Name: Janis Reese  MRN: [id number]  Patient Class: IP- Inpatient   Admission Date: 5/15/2023  Length of Stay: 3 days  Attending Physician: Wilner Mclaughlin MD  Primary Care Provider: Rula Betancourt MD        Subjective:     Principal Problem:Acute pancreatitis        HPI:  42-year-old female presents with abdominal pain.  Past medical history significant for multiple sclerosis, hypertension and chronic pain on Suboxone.  She was admitted to hospital last month with acute pancreatitis secondary to alcohol.  Endorses using alcohol over the weekend for mother's Day.  She woke up this morning shortly prior to arrival with severe abdominal pain to the left upper quadrant with nausea and vomiting.  Pain radiates to her back.  She presents hypertensive and tachycardic and very uncomfortable secondary to pain.  She denies any fever or chills.  No alleviating or aggravating factors.  Work up consistent with acute pancreatitis.  No other complaints.      Overview/Hospital Course:  Patient admitted to the hospital for alcoholic pancreatitis. She clinically improved over the course of several days       Interval History: No new issues     Review of Systems   Constitutional:  Negative for activity change.   Respiratory:  Negative for apnea.    Gastrointestinal:  Negative for abdominal pain.   Genitourinary:  Negative for difficulty urinating.   Neurological:  Negative for dizziness.   Objective:     Vital Signs (Most Recent):  Temp: 97.6 °F (36.4 °C) (05/18/23 0749)  Pulse: 65 (05/18/23 0749)  Resp: 16 (05/18/23 0809)  BP: (!) 122/57 (05/18/23 0749)  SpO2: 99 % (05/18/23 0749) Vital Signs (24h Range):  Temp:  [97.4 °F (36.3 °C)-98.1 °F (36.7 °C)] 97.6 °F (36.4 °C)  Pulse:  [62-78] 65  Resp:  [16-20] 16  SpO2:  [95 %-99 %] 99 %  BP: (106-133)/(57-79) 122/57     Weight: 67.6 kg (149 lb)  Body mass index is 29.1 kg/m².    Intake/Output Summary (Last 24  hours) at 5/18/2023 0844  Last data filed at 5/18/2023 0610  Gross per 24 hour   Intake 3043.69 ml   Output 2900 ml   Net 143.69 ml         Physical Exam  Constitutional:       Appearance: She is obese. She is ill-appearing.   Pulmonary:      Effort: Pulmonary effort is normal. No respiratory distress.   Abdominal:      Tenderness: There is abdominal tenderness. There is no guarding or rebound.   Neurological:      Mental Status: She is alert and oriented to person, place, and time.           Significant Labs: All pertinent labs within the past 24 hours have been reviewed.  BMP:   Recent Labs   Lab 05/18/23  0642   *      K 3.8   *   CO2 20*   BUN 7   CREATININE 0.7   CALCIUM 8.5*   MG 1.9     CBC:   Recent Labs   Lab 05/17/23  0410 05/18/23  0642   WBC 8.56 7.02   HGB 9.9* 9.4*   HCT 29.1* 27.9*   PLT 79* 96*       Significant Imaging:       Assessment/Plan:      * Acute pancreatitis  Recurrent pancreatitis secondary to ETOH abuse.  Place NPO.  IVF hydration.  Anti emetics and prn pain medications.  Leukocytosis.  Will check CT of abdomen.  Continue supportive care.      IV fluids. NPO today. Clinically doing better.  Clear liquids     Alcohol abuse  Counseled on importance of cessation as leading to recurrent pancreatitis.  MVI, Thiamine, Folate.  Ativan prn withdrawal symptoms.      Chronic pain  Probably related to MS.  On home Suboxone.  Currently in severe pain secondary to acute pancreatitis.  Treat acute pain and restart Suboxone upon discharge.      Hypercalcemia  Probably related to fluid depletion.  IVF's and recheck labs in Am.  Also check PTH in Am.      Leukocytosis  Probably reactive and secondary to acute issues.  Afebrile.  Will monitor off ABx's for now.      Bipolar 1 disorder  Stable  Resume home medications.      Essential hypertension  Uncontrolled and probably exacerbated by pain.  Resume home medications with parameters.  Add prn Clonidine.      Multiple  sclerosis  Outpatient follow up.        VTE Risk Mitigation (From admission, onward)         Ordered     Place DAVID hose  Until discontinued         05/15/23 0647     IP VTE LOW RISK PATIENT  Once         05/15/23 0647     Place sequential compression device  Until discontinued         05/15/23 0647                Discharge Planning   ROSA: 5/15/2023     Code Status: Full Code   Is the patient medically ready for discharge?:     Reason for patient still in hospital (select all that apply): Patient unstable  Discharge Plan A: Home with family        Will try clears again today. Did not tolerate yesterday            Wilner Clark MD  Department of Hospital Medicine   [facility]

## 2023-05-18 NOTE — SUBJECTIVE & OBJECTIVE
Interval History: No new issues     Review of Systems   Constitutional:  Negative for activity change.   Respiratory:  Negative for apnea.    Gastrointestinal:  Negative for abdominal pain.   Genitourinary:  Negative for difficulty urinating.   Neurological:  Negative for dizziness.   Objective:     Vital Signs (Most Recent):  Temp: 97.6 °F (36.4 °C) (05/18/23 0749)  Pulse: 65 (05/18/23 0749)  Resp: 16 (05/18/23 0809)  BP: (!) 122/57 (05/18/23 0749)  SpO2: 99 % (05/18/23 0749) Vital Signs (24h Range):  Temp:  [97.4 °F (36.3 °C)-98.1 °F (36.7 °C)] 97.6 °F (36.4 °C)  Pulse:  [62-78] 65  Resp:  [16-20] 16  SpO2:  [95 %-99 %] 99 %  BP: (106-133)/(57-79) 122/57     Weight: 67.6 kg (149 lb)  Body mass index is 29.1 kg/m².    Intake/Output Summary (Last 24 hours) at 5/18/2023 0844  Last data filed at 5/18/2023 0610  Gross per 24 hour   Intake 3043.69 ml   Output 2900 ml   Net 143.69 ml         Physical Exam  Constitutional:       Appearance: She is obese. She is ill-appearing.   Pulmonary:      Effort: Pulmonary effort is normal. No respiratory distress.   Abdominal:      Tenderness: There is abdominal tenderness. There is no guarding or rebound.   Neurological:      Mental Status: She is alert and oriented to person, place, and time.           Significant Labs: All pertinent labs within the past 24 hours have been reviewed.  BMP:   Recent Labs   Lab 05/18/23  0642   *      K 3.8   *   CO2 20*   BUN 7   CREATININE 0.7   CALCIUM 8.5*   MG 1.9     CBC:   Recent Labs   Lab 05/17/23  0410 05/18/23  0642   WBC 8.56 7.02   HGB 9.9* 9.4*   HCT 29.1* 27.9*   PLT 79* 96*       Significant Imaging:

## 2023-05-18 NOTE — PLAN OF CARE
Case Management Re-Assessment      PCP: Rula Betancourt  Pharmacy: [facility], [place]      Patient Arrived From: Home  Existing Help at Home: [address] Reese     Barriers to Discharge: None     Discharge Plan:                A. Home                B.Home with family     Plan for discharge home. Advance diet today. Goal for discharge tomorrow, if tolerates diet. [place] to continue to follow for dc needs.    05/18/23 1415   Discharge Reassessment   Assessment Type Discharge Planning Reassessment   Discharge Plan discussed with: Patient   Communicated ROSA with patient/caregiver Yes   Why the patient remains in the hospital Requires continued medical care   Post-Acute Status   Discharge Delays None known at this time        no

## 2023-05-18 NOTE — NURSING
[facility] [place]  Nurses Note -- 4 Eyes      5/18/2023       Skin assessed on: Q Shift      [x] No Pressure Injuries Present    []Prevention Measures Documented    [] Yes LDA  for Pressure Injury Previously documented     [] Yes New Pressure Injury Discovered   [] LDA for New Pressure Injury Added      Attending RN:  Rosangela Harley RN     Second RN:  LAURI Thomas

## 2023-05-18 NOTE — PLAN OF CARE
Problem: Adult Inpatient Plan of Care  Goal: Plan of Care Review  Outcome: Ongoing, Progressing  Goal: Patient-Specific Goal (Individualized)  Outcome: Ongoing, Progressing     Problem: Infection  Goal: Absence of Infection Signs and Symptoms  Outcome: Ongoing, Progressing     Problem: Skin Injury Risk Increased  Goal: Skin Health and Integrity  Outcome: Ongoing, Progressing     Pt free from falls or any further trauma through out the shift. Prescribed medication administered. Complaints of pain. Pt in no distress. Will continue to monitor.

## 2023-05-18 NOTE — PLAN OF CARE
Problem: Pain Acute  Goal: Acceptable Pain Control and Functional Ability  Intervention: Develop Pain Management Plan  Flowsheets (Taken 5/18/2023 1637)  Pain Management Interventions: medication offered  Intervention: Prevent or Manage Pain  Flowsheets (Taken 5/18/2023 1637)  Bowel Elimination Promotion:   adequate fluid intake promoted   diet adjusted  Medication Review/Management:   medications reviewed   high-risk medications identified  Intervention: Optimize Psychosocial Wellbeing  Flowsheets (Taken 5/18/2023 1637)  Supportive Measures:   active listening utilized   decision-making supported   goal-setting facilitated  Diversional Activities: television     Problem: Pain Acute  Goal: Acceptable Pain Control and Functional Ability  Intervention: Develop Pain Management Plan  Flowsheets (Taken 5/18/2023 1637)  Pain Management Interventions: medication offered  Intervention: Prevent or Manage Pain  Flowsheets (Taken 5/18/2023 1637)  Bowel Elimination Promotion:   adequate fluid intake promoted   diet adjusted  Medication Review/Management:   medications reviewed   high-risk medications identified  Intervention: Optimize Psychosocial Wellbeing  Flowsheets (Taken 5/18/2023 1637)  Supportive Measures:   active listening utilized   decision-making supported   goal-setting facilitated  Diversional Activities: television     Problem: Pain Acute  Goal: Acceptable Pain Control and Functional Ability  Intervention: Develop Pain Management Plan  Flowsheets (Taken 5/18/2023 1637)  Pain Management Interventions: medication offered     Problem: Pain Acute  Goal: Acceptable Pain Control and Functional Ability  Intervention: Prevent or Manage Pain  Flowsheets (Taken 5/18/2023 1637)  Bowel Elimination Promotion:   adequate fluid intake promoted   diet adjusted  Medication Review/Management:   medications reviewed   high-risk medications identified     Problem: Pain Acute  Goal: Acceptable Pain Control and Functional  Ability  Intervention: Optimize Psychosocial Wellbeing  Flowsheets (Taken 5/18/2023 7020)  Supportive Measures:   active listening utilized   decision-making supported   goal-setting facilitated  Diversional Activities: television

## 2023-05-18 NOTE — NURSING
Pt tolerated diet advanced to soft dysphagia. C/o of pain and medicated PRN with good relief. Pain med needs are decreasing. Legs weak due to MS and elevated on wedge for comfort.

## 2023-05-19 ENCOUNTER — TELEPHONE (OUTPATIENT)
Dept: PRIMARY CARE CLINIC | Facility: CLINIC | Age: 43
End: 2023-05-19
Payer: MEDICAID

## 2023-05-19 VITALS
OXYGEN SATURATION: 94 % | BODY MASS INDEX: 29.25 KG/M2 | RESPIRATION RATE: 20 BRPM | HEART RATE: 110 BPM | SYSTOLIC BLOOD PRESSURE: 140 MMHG | WEIGHT: 149 LBS | DIASTOLIC BLOOD PRESSURE: 87 MMHG | HEIGHT: 60 IN | TEMPERATURE: 98 F

## 2023-05-19 PROCEDURE — 25000003 PHARM REV CODE 250: Performed by: INTERNAL MEDICINE

## 2023-05-19 PROCEDURE — 63600175 PHARM REV CODE 636 W HCPCS: Performed by: INTERNAL MEDICINE

## 2023-05-19 PROCEDURE — S5010 5% DEXTROSE AND 0.45% SALINE: HCPCS | Performed by: HOSPITALIST

## 2023-05-19 PROCEDURE — 25000003 PHARM REV CODE 250

## 2023-05-19 PROCEDURE — 25000003 PHARM REV CODE 250: Performed by: HOSPITALIST

## 2023-05-19 PROCEDURE — 63600175 PHARM REV CODE 636 W HCPCS: Performed by: STUDENT IN AN ORGANIZED HEALTH CARE EDUCATION/TRAINING PROGRAM

## 2023-05-19 RX ORDER — HYDRALAZINE HYDROCHLORIDE 20 MG/ML
10 INJECTION INTRAMUSCULAR; INTRAVENOUS ONCE
Status: DISCONTINUED | OUTPATIENT
Start: 2023-05-19 | End: 2023-05-19 | Stop reason: HOSPADM

## 2023-05-19 RX ORDER — HYDRALAZINE HYDROCHLORIDE 25 MG/1
100 TABLET, FILM COATED ORAL EVERY 8 HOURS
Status: DISCONTINUED | OUTPATIENT
Start: 2023-05-19 | End: 2023-05-19 | Stop reason: HOSPADM

## 2023-05-19 RX ORDER — HEPARIN 100 UNIT/ML
5 SYRINGE INTRAVENOUS
Status: DISCONTINUED | OUTPATIENT
Start: 2023-05-19 | End: 2023-05-19 | Stop reason: HOSPADM

## 2023-05-19 RX ORDER — HYDRALAZINE HYDROCHLORIDE 25 MG/1
50 TABLET, FILM COATED ORAL ONCE
Status: DISCONTINUED | OUTPATIENT
Start: 2023-05-19 | End: 2023-05-19

## 2023-05-19 RX ADMIN — VERAPAMIL HYDROCHLORIDE 120 MG: 120 TABLET, FILM COATED, EXTENDED RELEASE ORAL at 08:05

## 2023-05-19 RX ADMIN — HEPARIN 500 UNITS: 100 SYRINGE at 11:05

## 2023-05-19 RX ADMIN — ALUMINUM HYDROXIDE, MAGNESIUM HYDROXIDE, AND DIMETHICONE 30 ML: 200; 20; 200 SUSPENSION ORAL at 06:05

## 2023-05-19 RX ADMIN — ESCITALOPRAM OXALATE 10 MG: 10 TABLET ORAL at 08:05

## 2023-05-19 RX ADMIN — LABETALOL HYDROCHLORIDE 200 MG: 100 TABLET, FILM COATED ORAL at 08:05

## 2023-05-19 RX ADMIN — DEXTROSE AND SODIUM CHLORIDE: 5; 450 INJECTION, SOLUTION INTRAVENOUS at 05:05

## 2023-05-19 RX ADMIN — Medication 10 ML: at 08:05

## 2023-05-19 RX ADMIN — LAMOTRIGINE 100 MG: 100 TABLET ORAL at 08:05

## 2023-05-19 RX ADMIN — ISOSORBIDE DINITRATE 30 MG: 20 TABLET ORAL at 08:05

## 2023-05-19 RX ADMIN — THIAMINE HCL TAB 100 MG 100 MG: 100 TAB at 08:05

## 2023-05-19 RX ADMIN — LEVOTHYROXINE SODIUM 50 MCG: 50 TABLET ORAL at 05:05

## 2023-05-19 RX ADMIN — CLONIDINE HYDROCHLORIDE 0.1 MG: 0.1 TABLET ORAL at 04:05

## 2023-05-19 RX ADMIN — BUPROPION HYDROCHLORIDE 300 MG: 150 TABLET, FILM COATED, EXTENDED RELEASE ORAL at 08:05

## 2023-05-19 RX ADMIN — HYDRALAZINE HYDROCHLORIDE 50 MG: 25 TABLET, FILM COATED ORAL at 05:05

## 2023-05-19 RX ADMIN — HYDROMORPHONE HYDROCHLORIDE 1 MG: 1 INJECTION, SOLUTION INTRAMUSCULAR; INTRAVENOUS; SUBCUTANEOUS at 06:05

## 2023-05-19 RX ADMIN — MUPIROCIN: 20 OINTMENT TOPICAL at 08:05

## 2023-05-19 RX ADMIN — FOLIC ACID 1 MG: 1 TABLET ORAL at 08:05

## 2023-05-19 RX ADMIN — HYDROMORPHONE HYDROCHLORIDE 1 MG: 1 INJECTION, SOLUTION INTRAMUSCULAR; INTRAVENOUS; SUBCUTANEOUS at 10:05

## 2023-05-19 RX ADMIN — HYDROMORPHONE HYDROCHLORIDE 1 MG: 1 INJECTION, SOLUTION INTRAMUSCULAR; INTRAVENOUS; SUBCUTANEOUS at 02:05

## 2023-05-19 NOTE — PROGRESS NOTES
[facility] Medicine  Progress Note    Patient Name: Janis Reese  MRN: [id number]  Patient Class: IP- Inpatient   Admission Date: 5/15/2023  Length of Stay: 4 days  Attending Physician: Wilner Mclaughlin MD  Primary Care Provider: Rula Betancourt MD        Subjective:     Principal Problem:Acute pancreatitis        HPI:  42-year-old female presents with abdominal pain.  Past medical history significant for multiple sclerosis, hypertension and chronic pain on Suboxone.  She was admitted to hospital last month with acute pancreatitis secondary to alcohol.  Endorses using alcohol over the weekend for mother's Day.  She woke up this morning shortly prior to arrival with severe abdominal pain to the left upper quadrant with nausea and vomiting.  Pain radiates to her back.  She presents hypertensive and tachycardic and very uncomfortable secondary to pain.  She denies any fever or chills.  No alleviating or aggravating factors.  Work up consistent with acute pancreatitis.  No other complaints.      Overview/Hospital Course:  Patient admitted to the hospital for alcoholic pancreatitis. She clinically improved over the course of several days. She was tolerating her diet and was requesting discharge to home on 5/19. Activity as tolerated. Diet- regular. Follow up with PCP in one week.  Patient's CC has resolved.      Interval History: would like to go home     Review of Systems   Constitutional:  Negative for activity change.   Respiratory:  Negative for apnea.    Gastrointestinal:  Negative for abdominal pain.   Genitourinary:  Negative for difficulty urinating.   Neurological:  Negative for dizziness.   Objective:     Vital Signs (Most Recent):  Temp: 98.5 °F (36.9 °C) (05/19/23 0416)  Pulse: 81 (05/19/23 0416)  Resp: 20 (05/19/23 0609)  BP: (!) 182/90 (05/19/23 0609)  SpO2: 98 % (05/19/23 0416) Vital Signs (24h Range):  Temp:  [97.6 °F (36.4 °C)-98.5 °F (36.9 °C)] 98.5 °F (36.9 °C)  Pulse:   [65-86] 81  Resp:  [16-20] 20  SpO2:  [95 %-99 %] 98 %  BP: (101-190)/() 182/90     Weight: 67.6 kg (149 lb)  Body mass index is 29.1 kg/m².    Intake/Output Summary (Last 24 hours) at 5/19/2023 0714  Last data filed at 5/19/2023 0624  Gross per 24 hour   Intake 3153.67 ml   Output 252 ml   Net 2901.67 ml         Physical Exam  Constitutional:       Appearance: She is obese. She is not ill-appearing.   Pulmonary:      Effort: Pulmonary effort is normal. No respiratory distress.   Abdominal:      Tenderness: There is no abdominal tenderness. There is no guarding or rebound.   Neurological:      Mental Status: She is alert and oriented to person, place, and time.           Significant Labs: All pertinent labs within the past 24 hours have been reviewed.  BMP:   Recent Labs   Lab 05/18/23  0642   *      K 3.8   *   CO2 20*   BUN 7   CREATININE 0.7   CALCIUM 8.5*   MG 1.9     CBC:   Recent Labs   Lab 05/18/23  0642   WBC 7.02   HGB 9.4*   HCT 27.9*   PLT 96*       Significant Imaging:       Assessment/Plan:      * Acute pancreatitis  Recurrent pancreatitis secondary to ETOH abuse.  Place NPO.  IVF hydration.  Anti emetics and prn pain medications.  Leukocytosis.  Will check CT of abdomen.  Continue supportive care.      IV fluids. NPO today. Clinically doing better.  Clear liquids   Tolerating soft diet. No abdominal pain. Requesting discharge    Alcohol abuse  Counseled on importance of cessation as leading to recurrent pancreatitis.  MVI, Thiamine, Folate.  Ativan prn withdrawal symptoms.      Chronic pain  Probably related to MS.  On home Suboxone.  Currently in severe pain secondary to acute pancreatitis.  Treat acute pain and restart Suboxone upon discharge.      Hypercalcemia  Probably related to fluid depletion.  IVF's and recheck labs in Am.  Also check PTH in Am.      Leukocytosis  Probably reactive and secondary to acute issues.  Afebrile.  Will monitor off ABx's for now.      Bipolar  1 disorder  Stable  Resume home medications.      Essential hypertension  Uncontrolled and probably exacerbated by pain.  Resume home medications with parameters.  Add prn Clonidine.      Multiple sclerosis  Outpatient follow up.      Obesity Body mass index is 29.1 kg/m². weight loss as out patient     VTE Risk Mitigation (From admission, onward)         Ordered     Place DAVID hose  Until discontinued         05/15/23 0647     IP VTE LOW RISK PATIENT  Once         05/15/23 0647     Place sequential compression device  Until discontinued         05/15/23 0647                Discharge Planning   ROSA: 5/19/2023     Code Status: Full Code   Is the patient medically ready for discharge?:     Reason for patient still in hospital (select all that apply)  Discharge Plan A: Home with family   Discharge Delays: None known at this time              Wilner Clark MD  Department of Hospital Medicine   [facility] Surg

## 2023-05-19 NOTE — DISCHARGE SUMMARY
[facility] Medicine  Discharge Summary      Patient Name: Janis Reese  MRN: [id number]  JOSEPH: [id number]  Patient Class: IP- Inpatient  Admission Date: 5/15/2023  Hospital Length of Stay: 4 days  Discharge Date and Time:  05/19/2023 7:17 AM  Attending Physician: Wilner Mclaughlin MD   Discharging Provider: Wilner Mclaughlin MD  Primary Care Provider: Rula Betancourt MD    Primary Care Team: Networked reference to record PCT     HPI:   42-year-old female presents with abdominal pain.  Past medical history significant for multiple sclerosis, hypertension and chronic pain on Suboxone.  She was admitted to hospital last month with acute pancreatitis secondary to alcohol.  Endorses using alcohol over the weekend for mother's Day.  She woke up this morning shortly prior to arrival with severe abdominal pain to the left upper quadrant with nausea and vomiting.  Pain radiates to her back.  She presents hypertensive and tachycardic and very uncomfortable secondary to pain.  She denies any fever or chills.  No alleviating or aggravating factors.  Work up consistent with acute pancreatitis.  No other complaints.      * No surgery found *      Hospital Course:   Patient admitted to the hospital for alcoholic pancreatitis. She clinically improved over the course of several days. She was tolerating her diet and was requesting discharge to home on 5/19. Activity as tolerated. Diet- regular. Follow up with PCP in one week.  Patient's CC has resolved.       Goals of Care Treatment Preferences:  Code Status: Full Code      Consults:     No new Assessment & Plan notes have been filed under this hospital service since the last note was generated.  Service: Hospital Medicine    Final Active Diagnoses:    Diagnosis Date Noted POA    PRINCIPAL PROBLEM:  Acute pancreatitis [K85.90] 08/03/2021 Yes    Leukocytosis [D72.829] 05/15/2023 Yes    Hypercalcemia [E83.52] 05/15/2023 Yes    Chronic pain [G89.29]  05/15/2023 Yes     Chronic    Alcohol abuse [F10.10] 05/15/2023 Yes     Chronic    Bipolar 1 disorder [F31.9] 2020 Yes    Essential hypertension [I10] 2020 Yes    Multiple sclerosis [G35] 2020 Yes      Problems Resolved During this Admission:       Discharged Condition: good    Disposition: Home or Self Care    Follow Up:   Follow-up Information     Rula Betancourt MD. Schedule an appointment as soon as possible for a visit.    Specialty: Internal Medicine  Contact information:  [address] [facility]  [address]  [facility] [id number] [phone]             Rula Betancourt MD Follow up in 1 week(s).    Specialty: Internal Medicine  Contact information:  [address] [facility] [address]  [facility] [id number] [phone]                       Patient Instructions:   No discharge procedures on file.    Significant Diagnostic Studies:     Pending Diagnostic Studies:     None         Medications:  Reconciled Home Medications:      Medication List      CONTINUE taking these medications    buprenorphine-naloxone 8-2 mg 8-2 mg  Commonly known as: SUBOXONE  Place 1 each under the tongue 2 (two) times a day.     buPROPion 300 MG 24 hr tablet  Commonly known as: WELLBUTRIN XL  Take 300 mg by mouth once daily.     cloNIDine 0.1 MG tablet  Commonly known as: CATAPRES  Take 0.1 mg by mouth 3 (three) times daily as needed (withdrawal, anxiety, insomnia).     EScitalopram oxalate 10 MG tablet  Commonly known as: LEXAPRO  Take 10 mg by mouth once daily.     KESIMPTA PEN 20 mg/0.4 mL Pnij  Generic drug: ofatumumab  SMARTSI Milligram(s) SUB-Q Once a Month     labetaloL 200 MG tablet  Commonly known as: NORMODYNE  Take 200 mg by mouth 2 (two) times daily.     * lamoTRIgine 100 MG tablet  Commonly known as: LAMICTAL  Take 100 mg by mouth every morning.     * lamoTRIgine 50 mg Tbdl  Take 50 mg by mouth every evening.     levothyroxine 50 MCG tablet  Commonly known as: SYNTHROID  Take 50 mcg by  mouth before breakfast.     QUEtiapine 100 MG Tab  Commonly known as: SEROQUEL  Take 1 tablet (100 mg total) by mouth every evening.     tiZANidine 4 MG tablet  Commonly known as: ZANAFLEX  TAKE 1 TABLET BY MOUTH EVERY 8 HOURS     verapamiL 120 MG CR tablet  Commonly known as: CALAN-SR  TAKE 1 TABLET BY MOUTH EVERY MORNING BEFORE BREAKFAST         * This list has 2 medication(s) that are the same as other medications prescribed for you. Read the directions carefully, and ask your doctor or other care provider to review them with you.                Indwelling Lines/Drains at time of discharge:   Lines/Drains/Airways     Central Venous Catheter Line  Duration           Port A Cath Single Lumen 02/10/20 Subclavian Right 1194 days                Time spent on the discharge of patient: > 35  minutes         Wilner Clark MD  Department of Hospital Medicine  [facility] Surg

## 2023-05-19 NOTE — NURSING
Patient discharged to home by wheelchair with .  Chest exp full, even.  Breaths unlabored.  No pain or acute distress noted.

## 2023-05-19 NOTE — PLAN OF CARE
[facility] - Med Surg  Discharge Final Note    Patient clear to discharge from case management stand point. Reviewed follow up appointment for pcp, listed on avs. Pt's spouse will be taking her home.     Primary Care Provider: Rula Betancourt MD    Expected Discharge Date: 5/19/2023    Final Discharge Note (most recent)       Final Note - 05/19/23 1058          Final Note    Assessment Type Final Discharge Note (P)      Anticipated Discharge Disposition Home or Self Care (P)      What phone number can be called within the next 1-3 days to see how you are doing after discharge? [phone] (P)      Hospital Resources/Appts/Education Provided Provided patient/caregiver with written discharge plan information;Appointments scheduled and added to AVS (P)         Post-Acute Status    Discharge Delays None known at this time (P)                      Important Message from Medicare

## 2023-05-19 NOTE — SUBJECTIVE & OBJECTIVE
Interval History: would like to go home     Review of Systems   Constitutional:  Negative for activity change.   Respiratory:  Negative for apnea.    Gastrointestinal:  Negative for abdominal pain.   Genitourinary:  Negative for difficulty urinating.   Neurological:  Negative for dizziness.   Objective:     Vital Signs (Most Recent):  Temp: 98.5 °F (36.9 °C) (05/19/23 0416)  Pulse: 81 (05/19/23 0416)  Resp: 20 (05/19/23 0609)  BP: (!) 182/90 (05/19/23 0609)  SpO2: 98 % (05/19/23 0416) Vital Signs (24h Range):  Temp:  [97.6 °F (36.4 °C)-98.5 °F (36.9 °C)] 98.5 °F (36.9 °C)  Pulse:  [65-86] 81  Resp:  [16-20] 20  SpO2:  [95 %-99 %] 98 %  BP: (101-190)/() 182/90     Weight: 67.6 kg (149 lb)  Body mass index is 29.1 kg/m².    Intake/Output Summary (Last 24 hours) at 5/19/2023 0714  Last data filed at 5/19/2023 0624  Gross per 24 hour   Intake 3153.67 ml   Output 252 ml   Net 2901.67 ml         Physical Exam  Constitutional:       Appearance: She is obese. She is not ill-appearing.   Pulmonary:      Effort: Pulmonary effort is normal. No respiratory distress.   Abdominal:      Tenderness: There is no abdominal tenderness. There is no guarding or rebound.   Neurological:      Mental Status: She is alert and oriented to person, place, and time.           Significant Labs: All pertinent labs within the past 24 hours have been reviewed.  BMP:   Recent Labs   Lab 05/18/23 0642   *      K 3.8   *   CO2 20*   BUN 7   CREATININE 0.7   CALCIUM 8.5*   MG 1.9     CBC:   Recent Labs   Lab 05/18/23 0642   WBC 7.02   HGB 9.4*   HCT 27.9*   PLT 96*       Significant Imaging:

## 2023-05-19 NOTE — NURSING
Flushed port a cath flushed per protocol.  Deaccessed port a cath, JENSEN needle discarded.  Site free of redness, pain, edema.  Dressing placed.

## 2023-05-19 NOTE — NURSING
[facility] - [place]  Nurses Note -- 4 Eyes      5/19/2023       Skin assessed on: Q Shift      [x] No Pressure Injuries Present    [x]Prevention Measures Documented    [] Yes LDA  for Pressure Injury Previously documented     [] Yes New Pressure Injury Discovered   [] LDA for New Pressure Injury Added      Attending RN:  Sherin Man, RN     Second RN:  Rosangela

## 2023-05-19 NOTE — PLAN OF CARE
Problem: Adult Inpatient Plan of Care  Goal: Plan of Care Review  Outcome: Ongoing, Progressing  Goal: Patient-Specific Goal (Individualized)  Outcome: Ongoing, Progressing  Goal: Optimal Comfort and Wellbeing  Outcome: Ongoing, Progressing     Problem: Pain Acute  Goal: Acceptable Pain Control and Functional Ability  Outcome: Ongoing, Progressing     Pt free from falls or any further trauma through out he shift. Prescribed medication administered. Complaint of pain, prn medication administered. Inj D5+ 0.45% NS infusing at 125 ml/hr. External catheter in place. Pt in no distress. Will continue to monitor.

## 2023-05-19 NOTE — NURSING
IV hydralazine held at this time, PO BP meds given.  Will reassess blood pressure.  Notified Dr Mclaughlin

## 2023-05-19 NOTE — ASSESSMENT & PLAN NOTE
Recurrent pancreatitis secondary to ETOH abuse.  Place NPO.  IVF hydration.  Anti emetics and prn pain medications.  Leukocytosis.  Will check CT of abdomen.  Continue supportive care.      IV fluids. NPO today. Clinically doing better.  Clear liquids   Tolerating soft diet. No abdominal pain. Requesting discharge

## 2023-05-19 NOTE — TELEPHONE ENCOUNTER
----- Message from Lianna Avery sent at 5/19/2023  8:00 AM CDT -----  Regarding: Hospital F/U  Good morning     Patient is discharging today and needs a hospital follow up to be seen within 7 days.  Could assist with scheduling?    Thank you!

## 2023-05-19 NOTE — NURSING
Discharge teaching performed, patient and  verbalized adequate understanding.  Given opportunity to ask questions. Understands follow up information.

## 2023-07-08 ENCOUNTER — HOSPITAL ENCOUNTER (EMERGENCY)
Facility: HOSPITAL | Age: 43
Discharge: HOME | End: 2023-07-08
Attending: EMERGENCY MEDICINE
Payer: MEDICAID

## 2023-07-08 VITALS
BODY MASS INDEX: 28.47 KG/M2 | OXYGEN SATURATION: 97 % | HEART RATE: 60 BPM | WEIGHT: 145 LBS | TEMPERATURE: 98 F | DIASTOLIC BLOOD PRESSURE: 79 MMHG | RESPIRATION RATE: 15 BRPM | HEIGHT: 60 IN | SYSTOLIC BLOOD PRESSURE: 157 MMHG

## 2023-07-08 DIAGNOSIS — N30.90 CYSTITIS: ICD-10-CM

## 2023-07-08 DIAGNOSIS — R30.0 DYSURIA: Primary | ICD-10-CM

## 2023-07-08 LAB
BACTERIA #/AREA URNS HPF: ABNORMAL /HPF
BILIRUB UR QL STRIP: NEGATIVE
CAOX CRY URNS QL MICRO: ABNORMAL
CLARITY UR: ABNORMAL
COLOR UR: YELLOW
GLUCOSE UR QL STRIP: NEGATIVE
HGB UR QL STRIP: ABNORMAL
HYALINE CASTS #/AREA URNS LPF: 0 /LPF
KETONES UR QL STRIP: NEGATIVE
LEUKOCYTE ESTERASE UR QL STRIP: ABNORMAL
MICROSCOPIC COMMENT: ABNORMAL
NITRITE UR QL STRIP: POSITIVE
NON-SQ EPI CELLS #/AREA URNS HPF: 2 /HPF
PH UR STRIP: 6 [PH] (ref 5–8)
PROT UR QL STRIP: ABNORMAL
RBC #/AREA URNS HPF: >100 /HPF (ref 0–4)
SP GR UR STRIP: 1.02 (ref 1–1.03)
SQUAMOUS #/AREA URNS HPF: 2 /HPF
URN SPEC COLLECT METH UR: ABNORMAL
UROBILINOGEN UR STRIP-ACNC: NEGATIVE EU/DL
WBC #/AREA URNS HPF: >100 /HPF (ref 0–5)
WBC CLUMPS URNS QL MICRO: ABNORMAL

## 2023-07-08 PROCEDURE — 63600175 PHARM REV CODE 636 W HCPCS: Performed by: EMERGENCY MEDICINE

## 2023-07-08 PROCEDURE — 87086 URINE CULTURE/COLONY COUNT: CPT

## 2023-07-08 PROCEDURE — 87088 URINE BACTERIA CULTURE: CPT

## 2023-07-08 PROCEDURE — 87186 SC STD MICRODIL/AGAR DIL: CPT

## 2023-07-08 PROCEDURE — 87077 CULTURE AEROBIC IDENTIFY: CPT

## 2023-07-08 PROCEDURE — 99283 EMERGENCY DEPT VISIT LOW MDM: CPT

## 2023-07-08 PROCEDURE — 87147 CULTURE TYPE IMMUNOLOGIC: CPT

## 2023-07-08 PROCEDURE — 81000 URINALYSIS NONAUTO W/SCOPE: CPT

## 2023-07-08 RX ORDER — NITROFURANTOIN 25; 75 MG/1; MG/1
100 CAPSULE ORAL 2 TIMES DAILY
Qty: 10 CAPSULE | Refills: 0 | Status: SHIPPED | OUTPATIENT
Start: 2023-07-08 | End: 2023-07-13

## 2023-07-08 RX ORDER — HEPARIN 100 UNIT/ML
5 SYRINGE INTRAVENOUS
Status: COMPLETED | OUTPATIENT
Start: 2023-07-08 | End: 2023-07-08

## 2023-07-08 RX ADMIN — HEPARIN 500 UNITS: 100 SYRINGE at 05:07

## 2023-07-08 NOTE — DISCHARGE INSTRUCTIONS
Please take antibiotics as prescribed.  Please return if your symptoms do not start improving over the next 24 hours or if they worsen such as worsening abdominal pain, fever, intractable vomiting.  As discussed, this would necessitate a larger workup with blood work and possibly imaging of your abdomen.

## 2023-07-08 NOTE — Clinical Note
Lane Reese accompanied their spouse to the emergency department on 7/8/2023. They may return to work on 07/10/2023.      If you have any questions or concerns, please don't hesitate to call.      Red Guzman RN

## 2023-07-08 NOTE — ED TRIAGE NOTES
Pt to the ED with complaints of dysuria, malodorous, dark urine, and lower abdominal pain x3 days. Pt reports hx of UTIs. Pt also reports she would like her port flushed as she usually gets it flushed once a month but has not been able to do it as shes displaced from her home in [place] where her sister in law does the flushes.

## 2023-07-08 NOTE — ED PROVIDER NOTES
Encounter Date: 2023       History     Chief Complaint   Patient presents with    Dysuria     Patient with painful urination, dysuria, and odor for past few days. Also states needs power port flushed H/O of MS     Chief complaint:  Dysuria    History of present illness:  42-year-old female with a past medical history of depression, hypertension, multiple sclerosis, presents emergency department with lower abdominal discomfort mostly in the suprapubic and left lower quadrant areas as well as urinary frequency, discomfort, and decreased urine output over the past 3 days.  She states these symptoms are identical to past urinary tract infections.  She denies any fever.  She denies any vomiting.  She denies back pain.  She states she has had some diarrhea.  No other symptoms.    Review of patient's allergies indicates:   Allergen Reactions    Metoclopramide hcl     Tramadol Anaphylaxis     Past Medical History:   Diagnosis Date    Depression     Hypertension     Multiple sclerosis      Past Surgical History:   Procedure Laterality Date     SECTION  2006    DILATION AND CURETTAGE OF UTERUS      LITHOTRIPSY      PORTACATH PLACEMENT Right 02/10/2020     Family History   Adopted: Yes     Social History     Tobacco Use    Smoking status: Former     Types: Vaping with nicotine    Smokeless tobacco: Former   Substance Use Topics    Alcohol use: Yes     Comment: occassionally    Drug use: Never     Review of Systems   Constitutional:  Negative for fever.   HENT:  Negative for sore throat.    Respiratory:  Negative for shortness of breath.    Cardiovascular:  Negative for chest pain.   Gastrointestinal:  Positive for abdominal pain. Negative for nausea.   Genitourinary:  Positive for decreased urine volume, dysuria and urgency.   Musculoskeletal:  Negative for back pain.   Skin:  Negative for rash.   Neurological:  Negative for weakness.   Hematological:  Does not bruise/bleed easily.   Psychiatric/Behavioral:   Negative for agitation and behavioral problems.      Physical Exam     Initial Vitals [07/08/23 1610]   BP Pulse Resp Temp SpO2   (!) 151/92 81 18 98.2 °F (36.8 °C) 97 %      MAP       --         Physical Exam    Nursing note and vitals reviewed.  Constitutional: She appears well-developed and well-nourished. She is not diaphoretic. No distress.   HENT:   Head: Normocephalic and atraumatic.   Right Ear: External ear normal.   Left Ear: External ear normal.   Nose: Nose normal.   Mouth/Throat: Oropharynx is clear and moist.   Eyes: Conjunctivae and EOM are normal. Pupils are equal, round, and reactive to light. Right eye exhibits no discharge. Left eye exhibits no discharge. No scleral icterus.   Neck: Neck supple.   Normal range of motion.  Cardiovascular:  Normal rate, regular rhythm, normal heart sounds and intact distal pulses.           No murmur heard.  Pulmonary/Chest: Breath sounds normal. No respiratory distress. She has no wheezes. She has no rhonchi. She has no rales.   Abdominal: Abdomen is soft. Bowel sounds are normal. She exhibits no distension and no mass. There is no abdominal tenderness.   Abdomen soft.  Mild discomfort in the suprapubic and bilateral lower quadrants. There is no rebound and no guarding.   Musculoskeletal:         General: No tenderness or edema. Normal range of motion.      Cervical back: Normal range of motion and neck supple.     Neurological: She is alert and oriented to person, place, and time. She has normal strength. No cranial nerve deficit or sensory deficit.   Skin: Skin is warm and dry. No rash noted. No erythema. No pallor.   Psychiatric: She has a normal mood and affect. Her behavior is normal. Judgment and thought content normal.       ED Course   Procedures  Labs Reviewed   URINALYSIS, REFLEX TO URINE CULTURE - Abnormal; Notable for the following components:       Result Value    Appearance, UA Hazy (*)     Protein, UA 2+ (*)     Occult Blood UA 2+ (*)     Nitrite, UA  Positive (*)     Leukocytes, UA 3+ (*)     All other components within normal limits    Narrative:     Specimen Source->Urine   URINALYSIS MICROSCOPIC - Abnormal; Notable for the following components:    RBC, UA >100 (*)     WBC, UA >100 (*)     WBC Clumps, UA Moderate (*)     Non-Squam Epith 2 (*)     Ca Oxalate Niki, UA Many (*)     All other components within normal limits    Narrative:     Specimen Source->Urine   CULTURE, URINE   POCT URINE PREGNANCY          Imaging Results    None          Medications   heparin, porcine (PF) 100 unit/mL injection flush 500 Units (500 Units Intravenous Given 7/8/23 1747)     Medical Decision Making:   ED Management:  This is the emergent evaluation of a 42-year-old female who presents emergency department for evaluation of dysuria and increased urinary frequency with less urine output.  Patient also has some lower abdominal discomfort.  She states all the symptoms are consistent with prior urinary tract infections which she as frequently in the setting of MS.  She does not self cath.  No fever.  No vomiting.  She states she does have some diarrhea.  Differential diagnosis at the time of initial evaluation included, but was not limited to:  Urinary tract infection, other cystitis.  Patient has no fever in the emergency department.  Her heart rate in the room is in the 70s.  She is not hypotensive.  We discussed the possibility of other causes of lower abdominal pain but she states these symptoms are identical to prior urinary tract infections.  There is no evidence of peritonitis.  Patient has findings consistent with urinary tract infection.  I will treat her with nitrofurantoin as she states this has worked in the past.  She is stable for discharge.  I reiterated with her the importance of return emergency department if she is feeling worse, such as persistent abdominal pain, fever, which could necessitate larger workup including abdominal imaging.  All questions answered  prior to discharge.                        Clinical Impression:   Final diagnoses:  [R30.0] Dysuria (Primary)  [N30.90] Cystitis        ED Disposition Condition    Discharge Stable          ED Prescriptions       Medication Sig Dispense Start Date End Date Auth. Provider    nitrofurantoin, macrocrystal-monohydrate, (MACROBID) 100 MG capsule Take 1 capsule (100 mg total) by mouth 2 (two) times daily. for 5 days 10 capsule 7/8/2023 7/13/2023 Gregg Sparks Jr., MD          Follow-up Information       Follow up With Specialties Details Why Contact Info    Rula Betancourt MD Internal Medicine Call in 3 days  [address] [facility]  [address]  [facility] [address] [phone]               Gregg Sparks Jr., MD  07/08/23 8999

## 2023-07-10 LAB
BACTERIA UR CULT: ABNORMAL
BACTERIA UR CULT: ABNORMAL

## 2023-07-12 NOTE — DISCHARGE SUMMARY
[facility] Medicine  Discharge Summary      Patient Name: Janis Reese  MRN: [id number]  [facility]: [id number]  Patient Class: IP- Inpatient  Admission Date: 4/7/2023  Hospital Length of Stay: 2 days  Discharge Date and Time:  04/09/2023 3:38 PM  Attending Physician: Azam Roland DO   Discharging Provider: Azam Roland DO  Primary Care Provider: Rula Betancourt MD    Primary Care Team: Networked reference to record PCT     HPI:   Mrs. Reese is a 41 yo F with PMHx of HTN, chronic pain on suboxone, Multiple Sclerosis who presents to the ED for evaluation of abdominal pain.  Patient states the pain started about 3 days ago and has continually gotten worse.  Patient states she was diagnosed with pancreatitis about 4 months ago but was never told why she had this.  After discussing further with patient and her , they had just went out to dinner and had multiple alcoholic drinks the night prior to her abdominal pain starting.  Denies nausea, vomiting, diarrhea.  No fevers or chills.  In the emergency department the patient was found to have an elevated lipase at 2:35 and imaging consistent with pancreatitis.  She was given IV fluids and IV pain medications.  She will be admitted to hospital medicine for further management.        * No surgery found *      Hospital Course:   41 yo F with PMHx of HTN, chronic pain on suboxone, Multiple Sclerosis admitted with acute pancreatitis. Started on IVF, pain control, clear liquids. Likely secondary to alcohol use. Pain improving. Electrolytes replaced. Patient's diet was advanced and she tolerated it without any abdominal pain, nausea or vomiting. Overnight on 04/08, patient had what appears to be one of her bipolar episodes where she was extremely paranoid and thought staff was out to get her. Episode improved when patient arrived at bedside and spent the night. On morning of discharge, patient was calm, cooperative, and without any visual or  auditory hallucinations.  at bedside states quita is at her baseline.      Patient admits feeling significantly better.  No longer having any abdominal pain.  Has tolerated her diet without any difficulty. Pt denies any fever, headaches, vision changes, chest pain, shortness of breath, palpitations, abdominal pain, nausea, vomiting, or any new weaknesses. Feels ready to go home. Patient's exam on discharge was as follow: Patient is alert and oriented, appears in no acute distress, heart with regular rate and rhythm, lungs clear to asculation with non-labored breathing, abdomen soft and nontender, and no new weaknesses or focal deficits seen. Bilateral lower extremities without any edema or calf tenderness.     Patient was counseled regarding any abnormal labs, differential diagnosis, treatment options, risk-benefit, lifestyle changes, prognosis, current condition, and medications. Patient was interactive and attentive.  Patient's questions were answered in a respectful and timely manner. Patient was instructed to follow-up with PCP within 1 week and to continue taking medications as prescribed.  Also, extensively discussed the risks, benefits, and side effects of patient's medications. Discussed with patient about any medication changes. Patient verbalized understanding and agrees to treatment plan.  Patient is stable for discharge.  Patient has no other questions or concerns at this time.  ED precautions discussed with the patient.    Vital signs are stable. Ambulating without any difficulty. Tolerating p.o. intake without any nausea or vomiting. Afebrile for over 24 hours. Patient is in stable condition and has no questions or concerns. Patient will be discharge to home. Prescriptions sent to pharmacy.  CM/SW to assist with discharge planning.     Vitals:    04/09/23 0400 04/09/23 0745 04/09/23 0820 04/09/23 1149   BP: (!) 180/106 (!) 144/85 (!) 181/95 (!) 138/93   BP Location: Left arm Left arm Left arm  Left arm   Patient Position: Lying Lying Lying Sitting   Pulse:  85 72 91   Resp: 18 18 18 16   Temp:  98.1 °F (36.7 °C) 98.4 °F (36.9 °C) 98.3 °F (36.8 °C)   TempSrc:  Oral Oral Oral   SpO2: 97% 97% 96% 95%   Weight:       Height:                Goals of Care Treatment Preferences:  Code Status: Full Code      Consults:   Consults (From admission, onward)        Status Ordering Provider     IP consult to case management  Once        Provider:  (Not yet assigned)    CAROL Chang          No new Assessment & Plan notes have been filed under this hospital service since the last note was generated.  Service: Hospital Medicine    Final Active Diagnoses:    Diagnosis Date Noted POA    PRINCIPAL PROBLEM:  Acute pancreatitis [K85.90] 08/03/2021 Yes    Bipolar 1 disorder [F31.9] 08/03/2020 Yes    Multiple sclerosis [G35] 08/03/2020 Yes    Essential hypertension [I10] 08/03/2020 Yes      Problems Resolved During this Admission:       Discharged Condition: stable    Disposition: Home or Self Care    Follow Up:   Follow-up Information     Rula Betancourt MD Follow up in 1 week(s).    Specialty: Internal Medicine  Contact information:  [address]  [address]  [address]  [facility] [address] [phone]                       Patient Instructions:      Diet Cardiac     Notify your health care provider if you experience any of the following:  severe uncontrolled pain     Notify your health care provider if you experience any of the following:  persistent nausea and vomiting or diarrhea     Notify your health care provider if you experience any of the following:  difficulty breathing or increased cough     Notify your health care provider if you experience any of the following:  persistent dizziness, light-headedness, or visual disturbances     Notify your health care provider if you experience any of the following:  increased confusion or weakness     Activity as tolerated       Significant Diagnostic  Studies:       Recent Results (from the past 100 hour(s))   CBC auto differential    Collection Time: 04/07/23 11:05 AM   Result Value Ref Range    WBC 9.26 3.90 - 12.70 K/uL    RBC 4.41 4.00 - 5.40 M/uL    Hemoglobin 13.8 12.0 - 16.0 g/dL    Hematocrit 39.0 37.0 - 48.5 %    MCV 88 82 - 98 fL    MCH 31.3 (H) 27.0 - 31.0 pg    MCHC 35.4 32.0 - 36.0 g/dL    RDW 11.9 11.5 - 14.5 %    Platelets 289 150 - 450 K/uL    MPV 9.6 9.2 - 12.9 fL    Immature Granulocytes 0.3 0.0 - 0.5 %    Gran # (ANC) 7.2 1.8 - 7.7 K/uL    Immature Grans (Abs) 0.03 0.00 - 0.04 K/uL    Lymph # 1.3 1.0 - 4.8 K/uL    Mono # 0.6 0.3 - 1.0 K/uL    Eos # 0.1 0.0 - 0.5 K/uL    Baso # 0.03 0.00 - 0.20 K/uL    nRBC 0 0 /100 WBC    Gran % 77.9 (H) 38.0 - 73.0 %    Lymph % 13.9 (L) 18.0 - 48.0 %    Mono % 6.5 4.0 - 15.0 %    Eosinophil % 1.1 0.0 - 8.0 %    Basophil % 0.3 0.0 - 1.9 %    Differential Method Automated    Comprehensive metabolic panel    Collection Time: 04/07/23 11:05 AM   Result Value Ref Range    Sodium 141 136 - 145 mmol/L    Potassium 3.5 3.5 - 5.1 mmol/L    Chloride 101 95 - 110 mmol/L    CO2 23 23 - 29 mmol/L    Glucose 170 (H) 70 - 110 mg/dL    BUN 5 (L) 6 - 20 mg/dL    Creatinine 0.8 0.5 - 1.4 mg/dL    Calcium 9.4 8.7 - 10.5 mg/dL    Total Protein 7.4 6.0 - 8.4 g/dL    Albumin 4.3 3.5 - 5.2 g/dL    Total Bilirubin 0.8 0.1 - 1.0 mg/dL    Alkaline Phosphatase 127 55 - 135 U/L    AST 56 (H) 10 - 40 U/L    ALT 85 (H) 10 - 44 U/L    Anion Gap 17 (H) 8 - 16 mmol/L    eGFR >60 >60 mL/min/1.73 m^2   Troponin I #1    Collection Time: 04/07/23 11:05 AM   Result Value Ref Range    Troponin I 0.011 0.000 - 0.026 ng/mL   B-Type natriuretic peptide (BNP)    Collection Time: 04/07/23 11:05 AM   Result Value Ref Range    BNP <10 0 - 99 pg/mL   D dimer, quantitative    Collection Time: 04/07/23 11:05 AM   Result Value Ref Range    D-Dimer 2.03 (H) <0.50 mg/L FEU   Lipase    Collection Time: 04/07/23 11:05 AM   Result Value Ref Range    Lipase 228 (H)  4 - 60 U/L   TSH    Collection Time: 04/07/23 11:05 AM   Result Value Ref Range    TSH 1.302 0.400 - 4.000 uIU/mL   Triglycerides    Collection Time: 04/07/23 11:05 AM   Result Value Ref Range    Triglycerides 350 (H) 30 - 150 mg/dL   Urinalysis, Reflex to Urine Culture Urine, Clean Catch    Collection Time: 04/07/23 11:36 AM    Specimen: Urine   Result Value Ref Range    Specimen UA Urine, Clean Catch     Color, UA Yellow Yellow, Straw, Lauren    Appearance, UA Hazy (A) Clear    pH, UA 7.0 5.0 - 8.0    Specific Gravity, UA 1.015 1.005 - 1.030    Protein, UA 1+ (A) Negative    Glucose, UA Negative Negative    Ketones, UA Trace (A) Negative    Bilirubin (UA) Negative Negative    Occult Blood UA Negative Negative    Nitrite, UA Negative Negative    Urobilinogen, UA Negative <2.0 EU/dL    Leukocytes, UA 1+ (A) Negative   Urinalysis Microscopic    Collection Time: 04/07/23 11:36 AM   Result Value Ref Range    RBC, UA 9 (H) 0 - 4 /hpf    WBC, UA 10 (H) 0 - 5 /hpf    Bacteria None None-Occ /hpf    Squam Epithel, UA 13 /hpf    Hyaline Casts, UA 0 0-1/lpf /lpf    Microscopic Comment SEE COMMENT    POCT urine pregnancy    Collection Time: 04/07/23 12:45 PM   Result Value Ref Range    POC Preg Test, Ur Negative Negative     Acceptable Yes    Troponin I #2    Collection Time: 04/07/23  2:00 PM   Result Value Ref Range    Troponin I <0.006 0.000 - 0.026 ng/mL   Triglycerides    Collection Time: 04/07/23  6:58 PM   Result Value Ref Range    Triglycerides 247 (H) 30 - 150 mg/dL   Comprehensive Metabolic Panel (CMP)    Collection Time: 04/08/23  4:52 AM   Result Value Ref Range    Sodium 136 136 - 145 mmol/L    Potassium 2.9 (L) 3.5 - 5.1 mmol/L    Chloride 99 95 - 110 mmol/L    CO2 25 23 - 29 mmol/L    Glucose 95 70 - 110 mg/dL    BUN 8 6 - 20 mg/dL    Creatinine 0.7 0.5 - 1.4 mg/dL    Calcium 8.7 8.7 - 10.5 mg/dL    Total Protein 5.8 (L) 6.0 - 8.4 g/dL    Albumin 3.5 3.5 - 5.2 g/dL    Total Bilirubin 0.9 0.1 - 1.0  mg/dL    Alkaline Phosphatase 96 55 - 135 U/L    AST 38 10 - 40 U/L    ALT 58 (H) 10 - 44 U/L    Anion Gap 12 8 - 16 mmol/L    eGFR >60 >60 mL/min/1.73 m^2   Magnesium    Collection Time: 04/08/23  4:52 AM   Result Value Ref Range    Magnesium 1.2 (L) 1.6 - 2.6 mg/dL   Phosphorus    Collection Time: 04/08/23  4:52 AM   Result Value Ref Range    Phosphorus 3.2 2.7 - 4.5 mg/dL   CBC with Automated Differential    Collection Time: 04/08/23  4:52 AM   Result Value Ref Range    WBC 5.93 3.90 - 12.70 K/uL    RBC 3.51 (L) 4.00 - 5.40 M/uL    Hemoglobin 11.0 (L) 12.0 - 16.0 g/dL    Hematocrit 31.5 (L) 37.0 - 48.5 %    MCV 90 82 - 98 fL    MCH 31.3 (H) 27.0 - 31.0 pg    MCHC 34.9 32.0 - 36.0 g/dL    RDW 11.9 11.5 - 14.5 %    Platelets 175 150 - 450 K/uL    MPV 9.5 9.2 - 12.9 fL    Immature Granulocytes 0.3 0.0 - 0.5 %    Gran # (ANC) 4.2 1.8 - 7.7 K/uL    Immature Grans (Abs) 0.02 0.00 - 0.04 K/uL    Lymph # 1.2 1.0 - 4.8 K/uL    Mono # 0.4 0.3 - 1.0 K/uL    Eos # 0.2 0.0 - 0.5 K/uL    Baso # 0.01 0.00 - 0.20 K/uL    nRBC 0 0 /100 WBC    Gran % 69.9 38.0 - 73.0 %    Lymph % 19.6 18.0 - 48.0 %    Mono % 7.1 4.0 - 15.0 %    Eosinophil % 2.9 0.0 - 8.0 %    Basophil % 0.2 0.0 - 1.9 %    Differential Method Automated    Comprehensive Metabolic Panel (CMP)    Collection Time: 04/09/23  5:31 AM   Result Value Ref Range    Sodium 136 136 - 145 mmol/L    Potassium 3.7 3.5 - 5.1 mmol/L    Chloride 101 95 - 110 mmol/L    CO2 26 23 - 29 mmol/L    Glucose 110 70 - 110 mg/dL    BUN 5 (L) 6 - 20 mg/dL    Creatinine 0.7 0.5 - 1.4 mg/dL    Calcium 9.1 8.7 - 10.5 mg/dL    Total Protein 6.5 6.0 - 8.4 g/dL    Albumin 3.7 3.5 - 5.2 g/dL    Total Bilirubin 0.7 0.1 - 1.0 mg/dL    Alkaline Phosphatase 106 55 - 135 U/L    AST 30 10 - 40 U/L    ALT 51 (H) 10 - 44 U/L    Anion Gap 9 8 - 16 mmol/L    eGFR >60 >60 mL/min/1.73 m^2   Magnesium    Collection Time: 04/09/23  5:31 AM   Result Value Ref Range    Magnesium 1.3 (L) 1.6 - 2.6 mg/dL   Phosphorus     Collection Time: 04/09/23  5:31 AM   Result Value Ref Range    Phosphorus 2.8 2.7 - 4.5 mg/dL   CBC with Automated Differential    Collection Time: 04/09/23  5:32 AM   Result Value Ref Range    WBC 8.35 3.90 - 12.70 K/uL    RBC 3.85 (L) 4.00 - 5.40 M/uL    Hemoglobin 12.1 12.0 - 16.0 g/dL    Hematocrit 34.6 (L) 37.0 - 48.5 %    MCV 90 82 - 98 fL    MCH 31.4 (H) 27.0 - 31.0 pg    MCHC 35.0 32.0 - 36.0 g/dL    RDW 11.9 11.5 - 14.5 %    Platelets 166 150 - 450 K/uL    MPV 9.7 9.2 - 12.9 fL    Immature Granulocytes 0.4 0.0 - 0.5 %    Gran # (ANC) 6.5 1.8 - 7.7 K/uL    Immature Grans (Abs) 0.03 0.00 - 0.04 K/uL    Lymph # 1.1 1.0 - 4.8 K/uL    Mono # 0.5 0.3 - 1.0 K/uL    Eos # 0.2 0.0 - 0.5 K/uL    Baso # 0.02 0.00 - 0.20 K/uL    nRBC 0 0 /100 WBC    Gran % 78.2 (H) 38.0 - 73.0 %    Lymph % 13.2 (L) 18.0 - 48.0 %    Mono % 5.7 4.0 - 15.0 %    Eosinophil % 2.3 0.0 - 8.0 %    Basophil % 0.2 0.0 - 1.9 %    Differential Method Automated    Magnesium    Collection Time: 04/09/23 12:50 PM   Result Value Ref Range    Magnesium 1.9 1.6 - 2.6 mg/dL       Microbiology Results (last 7 days)     ** No results found for the last 168 hours. **          Imaging Results           CTA Chest Non-Coronary (PE Studies) (Final result)  Result time 04/07/23 14:05:20    Final result by Ashu Nunes MD (04/07/23 14:05:20)                 Impression:      1. No acute cardiopulmonary process.  Specifically, no evidence of pulmonary thromboembolism or pulmonary infarction.  2. Findings suggesting sequela of acute, uncomplicated mild pancreatitis.  3. Hepatomegaly and hepatic steatosis.  4. Markedly atrophic polycystic right kidney.  5. Left renal nonobstructing nephrolithiasis.  Left renal simple cyst and several additional subcentimeter hypoattenuating parenchymal foci which are too small to characterize.  6. Moderate to large colonic stool burden without evidence of bowel obstruction or acute inflammation.  7. Few additional findings as  above.  This report was flagged in Epic as abnormal.  This report was flagged in Epic as containing an incidental finding.      Electronically signed by: Ashu Nunes MD  Date:    04/07/2023  Time:    14:05             Narrative:    EXAMINATION:  CT ABDOMEN PELVIS WITH CONTRAST; CTA CHEST NON CORONARY (PE STUDIES)    CLINICAL HISTORY:  Pancreatitis, acute, severe;; Pulmonary embolism (PE) suspected, positive D-dimer;    TECHNIQUE:  Axial images were obtained through the chest following the administration of 100 cc Omnipaque 350 IV contrast per PE protocol.  Subsequent delayed portal venous phase axial images were obtained through the abdomen and pelvis.  Coronal and sagittal reformatted images and axial MIPS were generated.    COMPARISON:  Chest radiograph same day and 06/03/2014, MRI thoracic spine 08/02/2011    FINDINGS:  Structures at the base of the neck are within normal limits.    Partially imaged thoracic soft tissues: No significant abnormality.    Aorta: Normal in course and caliber, without significant atherosclerotic plaque. There are three branching vessels at the arch.    Heart: Normal in size. No pericardial effusion.    Diya/Mediastinum: No significant lymphadenopathy    Lungs: Well expanded.  Minimal biapical pleuroparenchymal scarring.  Minimal dependent atelectasis.  Few scattered areas of minimal platelike scarring versus atelectasis.  No consolidation, pleural effusion, pneumothorax, pleural thickening or pleural calcifications.  No concerning pulmonary nodules.  Small pulmonary cyst within the medial aspect of the left upper lobe.    Liver: Enlarged measuring 20 cm in coronal length with diffuse parenchymal hypoattenuation and peripheral sparing.  No focal hepatic parenchymal lesion seen.    Gallbladder: No calcified gallstones.    Bile Ducts: No evidence of dilated ducts.    Pancreas: Normal in morphology with mild degree of peripancreatic fat stranding.  No pancreatic mass, pseudocyst,  parenchymal calcification or ductal dilatation.  No peripancreatic fluid collection seen.    Spleen: Unremarkable.    Adrenals: Unremarkable.    Kidneys/ Ureters: Right kidney is markedly atrophic with several parenchymal cysts with the largest measuring 2.3 cm.  Left kidney is upper limits of normal in size, but otherwise normal in location with normal enhancement.  2 mm calculus at the left renal interpolar region and 3 mm calculus at the left renal lower pole.  No hydronephrosis or significant perinephric stranding.  1.5 cm simple appearing parenchymal cyst within the posterior aspect of the left renal interpolar region.  Additional subcentimeter hypoattenuating parenchymal foci which are too small to characterize.  No ureteral dilatation.    Bladder: Suboptimally distended.    Reproductive organs: 1.8 cm oval hypodense structure at the right adnexa suggestive of a dominant follicle.  No left adnexal mass.  Uterus is within normal limits.  Few scattered punctate pelvic phleboliths noted.  No significant free fluid in the pelvis.    GI Tract/Mesentery: Stomach is within normal limits.  Duodenum is normal in morphology noting mild degree of adjacent fat stranding presumably related to pancreatic inflammatory process tracking to this region.  No evidence of bowel obstruction or inflammation. Moderate to large amount of stool noted throughout the colon and rectum.  Appendix and terminal ileum are within normal limits.  No pneumatosis or portal venous gas.    Peritoneal Space: Trace volume free fluid tracking along the upper right pericolic gutter and within the lesser sac, likely reactive.  No free air. No lymphadenopathy    Retroperitoneum: No significant adenopathy.    Abdominal wall: Tiny fat containing umbilical hernia.    Vasculature: Right upper chest vascular port terminates at the cavoatrial junction region.  No significant atherosclerosis, aneurysm or dissection.  Pulmonary trunk is within normal limits.   Pulmonary arteries distribute normally.  There are 4 main pulmonary veins draining to the left atrium.  No evidence of pulmonary thromboembolism or pulmonary infarction.  Portal vasculature is patent.    Bones: Osseous structures show minimal degenerative change without acute or destructive process seen.                                CT Abdomen Pelvis With Contrast (Final result)  Result time 04/07/23 14:05:20    Final result by Ashu Nunes MD (04/07/23 14:05:20)                 Impression:      1. No acute cardiopulmonary process.  Specifically, no evidence of pulmonary thromboembolism or pulmonary infarction.  2. Findings suggesting sequela of acute, uncomplicated mild pancreatitis.  3. Hepatomegaly and hepatic steatosis.  4. Markedly atrophic polycystic right kidney.  5. Left renal nonobstructing nephrolithiasis.  Left renal simple cyst and several additional subcentimeter hypoattenuating parenchymal foci which are too small to characterize.  6. Moderate to large colonic stool burden without evidence of bowel obstruction or acute inflammation.  7. Few additional findings as above.  This report was flagged in Epic as abnormal.  This report was flagged in Epic as containing an incidental finding.      Electronically signed by: Ashu Nunes MD  Date:    04/07/2023  Time:    14:05             Narrative:    EXAMINATION:  CT ABDOMEN PELVIS WITH CONTRAST; CTA CHEST NON CORONARY (PE STUDIES)    CLINICAL HISTORY:  Pancreatitis, acute, severe;; Pulmonary embolism (PE) suspected, positive D-dimer;    TECHNIQUE:  Axial images were obtained through the chest following the administration of 100 cc Omnipaque 350 IV contrast per PE protocol.  Subsequent delayed portal venous phase axial images were obtained through the abdomen and pelvis.  Coronal and sagittal reformatted images and axial MIPS were generated.    COMPARISON:  Chest radiograph same day and 06/03/2014, MRI thoracic spine 08/02/2011    FINDINGS:  Structures at  the base of the neck are within normal limits.    Partially imaged thoracic soft tissues: No significant abnormality.    Aorta: Normal in course and caliber, without significant atherosclerotic plaque. There are three branching vessels at the arch.    Heart: Normal in size. No pericardial effusion.    Diya/Mediastinum: No significant lymphadenopathy    Lungs: Well expanded.  Minimal biapical pleuroparenchymal scarring.  Minimal dependent atelectasis.  Few scattered areas of minimal platelike scarring versus atelectasis.  No consolidation, pleural effusion, pneumothorax, pleural thickening or pleural calcifications.  No concerning pulmonary nodules.  Small pulmonary cyst within the medial aspect of the left upper lobe.    Liver: Enlarged measuring 20 cm in coronal length with diffuse parenchymal hypoattenuation and peripheral sparing.  No focal hepatic parenchymal lesion seen.    Gallbladder: No calcified gallstones.    Bile Ducts: No evidence of dilated ducts.    Pancreas: Normal in morphology with mild degree of peripancreatic fat stranding.  No pancreatic mass, pseudocyst, parenchymal calcification or ductal dilatation.  No peripancreatic fluid collection seen.    Spleen: Unremarkable.    Adrenals: Unremarkable.    Kidneys/ Ureters: Right kidney is markedly atrophic with several parenchymal cysts with the largest measuring 2.3 cm.  Left kidney is upper limits of normal in size, but otherwise normal in location with normal enhancement.  2 mm calculus at the left renal interpolar region and 3 mm calculus at the left renal lower pole.  No hydronephrosis or significant perinephric stranding.  1.5 cm simple appearing parenchymal cyst within the posterior aspect of the left renal interpolar region.  Additional subcentimeter hypoattenuating parenchymal foci which are too small to characterize.  No ureteral dilatation.    Bladder: Suboptimally distended.    Reproductive organs: 1.8 cm oval hypodense structure at the right  adnexa suggestive of a dominant follicle.  No left adnexal mass.  Uterus is within normal limits.  Few scattered punctate pelvic phleboliths noted.  No significant free fluid in the pelvis.    GI Tract/Mesentery: Stomach is within normal limits.  Duodenum is normal in morphology noting mild degree of adjacent fat stranding presumably related to pancreatic inflammatory process tracking to this region.  No evidence of bowel obstruction or inflammation. Moderate to large amount of stool noted throughout the colon and rectum.  Appendix and terminal ileum are within normal limits.  No pneumatosis or portal venous gas.    Peritoneal Space: Trace volume free fluid tracking along the upper right pericolic gutter and within the lesser sac, likely reactive.  No free air. No lymphadenopathy    Retroperitoneum: No significant adenopathy.    Abdominal wall: Tiny fat containing umbilical hernia.    Vasculature: Right upper chest vascular port terminates at the cavoatrial junction region.  No significant atherosclerosis, aneurysm or dissection.  Pulmonary trunk is within normal limits.  Pulmonary arteries distribute normally.  There are 4 main pulmonary veins draining to the left atrium.  No evidence of pulmonary thromboembolism or pulmonary infarction.  Portal vasculature is patent.    Bones: Osseous structures show minimal degenerative change without acute or destructive process seen.                               X-Ray Chest AP Portable (Final result)  Result time 04/07/23 12:56:09    Final result by Elisha Montelongo MD (04/07/23 12:56:09)                 Impression:      No acute abnormality.      Electronically signed by: Elisha Montelongo MD  Date:    04/07/2023  Time:    12:56             Narrative:    EXAMINATION:  XR CHEST AP PORTABLE    CLINICAL HISTORY:  Chest Pain;    TECHNIQUE:  Single frontal view of the chest was performed.    COMPARISON:  [facility] 3, 2014    FINDINGS:  Indwelling right subclavian central venous  catheter has its tip overlying the expected location of the superior vena cava.The lungs are free of lobar consolidation and alveolar edema, with normal appearance of pulmonary vasculature and no large pleural effusion or pneumothorax.    The cardiac silhouette is normal in size. The hilar and mediastinal contours are unremarkable.    Bones are intact.                                    Pending Diagnostic Studies:     None         Medications:  Reconciled Home Medications:      Medication List      CHANGE how you take these medications    QUEtiapine 100 MG Tab  Commonly known as: SEROQUEL  Take 1 tablet (100 mg total) by mouth every evening.  What changed:   · medication strength  · how much to take        CONTINUE taking these medications    buprenorphine-naloxone 8-2 mg 8-2 mg  Commonly known as: SUBOXONE  Place 1 each under the tongue 2 (two) times a day.     buPROPion 300 MG 24 hr tablet  Commonly known as: WELLBUTRIN XL  Take 300 mg by mouth once daily.     ergocalciferol 50,000 unit Cap  Commonly known as: ERGOCALCIFEROL  Take 50,000 Units by mouth every 7 days.     EScitalopram oxalate 10 MG tablet  Commonly known as: LEXAPRO  Take 10 mg by mouth once daily.     labetaloL 200 MG tablet  Commonly known as: NORMODYNE  Take 200 mg by mouth 2 (two) times daily.     * lamoTRIgine 100 MG tablet  Commonly known as: LAMICTAL  Take 100 mg by mouth every morning.     * lamoTRIgine 50 mg Tbdl  Take 50 mg by mouth every evening.     levothyroxine 25 MCG tablet  Commonly known as: SYNTHROID  Take 2 tablets (50 mcg total) by mouth before breakfast.     tiZANidine 4 MG tablet  Commonly known as: ZANAFLEX  TAKE 1 TABLET BY MOUTH EVERY 8 HOURS     verapamiL 120 MG CR tablet  Commonly known as: CALAN-SR  TAKE 1 TABLET BY MOUTH EVERY MORNING BEFORE BREAKFAST         * This list has 2 medication(s) that are the same as other medications prescribed for you. Read the directions carefully, and ask your doctor or other care provider  to review them with you.            STOP taking these medications    cloNIDine 0.1 MG tablet  Commonly known as: CATAPRES            Indwelling Lines/Drains at time of discharge:   Lines/Drains/Airways     Central Venous Catheter Line  Duration            1154 days                Time spent on the discharge of patient: Greater than 35 minutes         Azam Roland DO  Department of Hospital Medicine  [facility] - Telemetry   - - -

## 2023-07-17 RX ORDER — LEVOTHYROXINE SODIUM 50 UG/1
TABLET ORAL
Qty: 28 TABLET | Refills: 3 | Status: SHIPPED | OUTPATIENT
Start: 2023-07-17 | End: 2024-01-10 | Stop reason: SDUPTHER

## 2023-07-21 ENCOUNTER — PATIENT MESSAGE (OUTPATIENT)
Dept: PSYCHIATRY | Facility: CLINIC | Age: 43
End: 2023-07-21
Payer: MEDICAID

## 2023-08-20 ENCOUNTER — HOSPITAL ENCOUNTER (INPATIENT)
Facility: HOSPITAL | Age: 43
LOS: 3 days | Discharge: HOME | DRG: 059 | End: 2023-08-23
Attending: EMERGENCY MEDICINE | Admitting: INTERNAL MEDICINE
Payer: MEDICAID

## 2023-08-20 DIAGNOSIS — G35 MULTIPLE SCLEROSIS: ICD-10-CM

## 2023-08-20 DIAGNOSIS — R07.9 CHEST PAIN: ICD-10-CM

## 2023-08-20 DIAGNOSIS — N39.0 URINARY TRACT INFECTION WITHOUT HEMATURIA, SITE UNSPECIFIED: Primary | ICD-10-CM

## 2023-08-20 PROBLEM — E87.1 HYPONATREMIA: Status: ACTIVE | Noted: 2023-08-20

## 2023-08-20 PROBLEM — R74.01 TRANSAMINITIS: Status: ACTIVE | Noted: 2023-08-20

## 2023-08-20 PROBLEM — E83.42 HYPOMAGNESEMIA: Status: ACTIVE | Noted: 2023-08-20

## 2023-08-20 PROBLEM — G89.29 CHRONIC PAIN: Status: ACTIVE | Noted: 2023-05-15

## 2023-08-20 PROBLEM — E03.9 HYPOTHYROIDISM: Status: ACTIVE | Noted: 2023-08-20

## 2023-08-20 LAB
ALBUMIN SERPL BCP-MCNC: 3.7 G/DL (ref 3.5–5.2)
ALP SERPL-CCNC: 129 U/L (ref 55–135)
ALT SERPL W/O P-5'-P-CCNC: 170 U/L (ref 10–44)
ANION GAP SERPL CALC-SCNC: 10 MMOL/L (ref 8–16)
AST SERPL-CCNC: 136 U/L (ref 10–40)
B-HCG UR QL: NEGATIVE
BACTERIA #/AREA URNS HPF: ABNORMAL /HPF
BASOPHILS # BLD AUTO: 0.01 K/UL (ref 0–0.2)
BASOPHILS NFR BLD: 0.2 % (ref 0–1.9)
BILIRUB SERPL-MCNC: 0.6 MG/DL (ref 0.1–1)
BILIRUB UR QL STRIP: NEGATIVE
BUN SERPL-MCNC: 4 MG/DL (ref 6–20)
CALCIUM SERPL-MCNC: 8.8 MG/DL (ref 8.7–10.5)
CHLORIDE SERPL-SCNC: 99 MMOL/L (ref 95–110)
CLARITY UR: ABNORMAL
CO2 SERPL-SCNC: 21 MMOL/L (ref 23–29)
COLOR UR: YELLOW
CREAT SERPL-MCNC: 0.8 MG/DL (ref 0.5–1.4)
CTP QC/QA: YES
DIFFERENTIAL METHOD: ABNORMAL
EOSINOPHIL # BLD AUTO: 0 K/UL (ref 0–0.5)
EOSINOPHIL NFR BLD: 0.7 % (ref 0–8)
ERYTHROCYTE [DISTWIDTH] IN BLOOD BY AUTOMATED COUNT: 12.4 % (ref 11.5–14.5)
EST. GFR  (NO RACE VARIABLE): >60 ML/MIN/1.73 M^2
GLUCOSE SERPL-MCNC: 217 MG/DL (ref 70–110)
GLUCOSE UR QL STRIP: NEGATIVE
HCT VFR BLD AUTO: 38.1 % (ref 37–48.5)
HGB BLD-MCNC: 13.1 G/DL (ref 12–16)
HGB UR QL STRIP: ABNORMAL
IMM GRANULOCYTES # BLD AUTO: 0.02 K/UL (ref 0–0.04)
IMM GRANULOCYTES NFR BLD AUTO: 0.3 % (ref 0–0.5)
KETONES UR QL STRIP: NEGATIVE
LACTATE SERPL-SCNC: 1.9 MMOL/L (ref 0.5–2.2)
LEUKOCYTE ESTERASE UR QL STRIP: ABNORMAL
LYMPHOCYTES # BLD AUTO: 0.6 K/UL (ref 1–4.8)
LYMPHOCYTES NFR BLD: 11.1 % (ref 18–48)
MAGNESIUM SERPL-MCNC: 1.5 MG/DL (ref 1.6–2.6)
MCH RBC QN AUTO: 30.8 PG (ref 27–31)
MCHC RBC AUTO-ENTMCNC: 34.4 G/DL (ref 32–36)
MCV RBC AUTO: 90 FL (ref 82–98)
MICROSCOPIC COMMENT: ABNORMAL
MONOCYTES # BLD AUTO: 0.4 K/UL (ref 0.3–1)
MONOCYTES NFR BLD: 6.4 % (ref 4–15)
NEUTROPHILS # BLD AUTO: 4.7 K/UL (ref 1.8–7.7)
NEUTROPHILS NFR BLD: 81.3 % (ref 38–73)
NITRITE UR QL STRIP: POSITIVE
NRBC BLD-RTO: 0 /100 WBC
PH UR STRIP: 7 [PH] (ref 5–8)
PLATELET # BLD AUTO: 195 K/UL (ref 150–450)
PMV BLD AUTO: 11 FL (ref 9.2–12.9)
POTASSIUM SERPL-SCNC: 4.8 MMOL/L (ref 3.5–5.1)
PROT SERPL-MCNC: 7.1 G/DL (ref 6–8.4)
PROT UR QL STRIP: ABNORMAL
RBC # BLD AUTO: 4.25 M/UL (ref 4–5.4)
RBC #/AREA URNS HPF: 33 /HPF (ref 0–4)
SODIUM SERPL-SCNC: 130 MMOL/L (ref 136–145)
SP GR UR STRIP: 1.02 (ref 1–1.03)
SQUAMOUS #/AREA URNS HPF: 3 /HPF
URN SPEC COLLECT METH UR: ABNORMAL
UROBILINOGEN UR STRIP-ACNC: ABNORMAL EU/DL
WBC # BLD AUTO: 5.74 K/UL (ref 3.9–12.7)
WBC #/AREA URNS HPF: >100 /HPF (ref 0–5)

## 2023-08-20 PROCEDURE — 87077 CULTURE AEROBIC IDENTIFY: CPT

## 2023-08-20 PROCEDURE — 63600175 PHARM REV CODE 636 W HCPCS: Performed by: EMERGENCY MEDICINE

## 2023-08-20 PROCEDURE — 25000003 PHARM REV CODE 250: Performed by: INTERNAL MEDICINE

## 2023-08-20 PROCEDURE — 83605 ASSAY OF LACTIC ACID: CPT | Performed by: EMERGENCY MEDICINE

## 2023-08-20 PROCEDURE — 96365 THER/PROPH/DIAG IV INF INIT: CPT

## 2023-08-20 PROCEDURE — 87040 BLOOD CULTURE FOR BACTERIA: CPT | Performed by: EMERGENCY MEDICINE

## 2023-08-20 PROCEDURE — 87186 SC STD MICRODIL/AGAR DIL: CPT

## 2023-08-20 PROCEDURE — 81000 URINALYSIS NONAUTO W/SCOPE: CPT

## 2023-08-20 PROCEDURE — 83735 ASSAY OF MAGNESIUM: CPT | Performed by: EMERGENCY MEDICINE

## 2023-08-20 PROCEDURE — 63600175 PHARM REV CODE 636 W HCPCS: Performed by: INTERNAL MEDICINE

## 2023-08-20 PROCEDURE — 51798 US URINE CAPACITY MEASURE: CPT

## 2023-08-20 PROCEDURE — 80053 COMPREHEN METABOLIC PANEL: CPT | Performed by: EMERGENCY MEDICINE

## 2023-08-20 PROCEDURE — A9585 GADOBUTROL INJECTION: HCPCS | Performed by: EMERGENCY MEDICINE

## 2023-08-20 PROCEDURE — 25000003 PHARM REV CODE 250: Performed by: EMERGENCY MEDICINE

## 2023-08-20 PROCEDURE — 87088 URINE BACTERIA CULTURE: CPT

## 2023-08-20 PROCEDURE — 85025 COMPLETE CBC W/AUTO DIFF WBC: CPT | Performed by: EMERGENCY MEDICINE

## 2023-08-20 PROCEDURE — 25500020 PHARM REV CODE 255: Performed by: EMERGENCY MEDICINE

## 2023-08-20 PROCEDURE — 99285 EMERGENCY DEPT VISIT HI MDM: CPT | Mod: 25

## 2023-08-20 PROCEDURE — 96375 TX/PRO/DX INJ NEW DRUG ADDON: CPT

## 2023-08-20 PROCEDURE — 81025 URINE PREGNANCY TEST: CPT

## 2023-08-20 PROCEDURE — 87086 URINE CULTURE/COLONY COUNT: CPT

## 2023-08-20 PROCEDURE — 11000001 HC ACUTE MED/SURG PRIVATE ROOM

## 2023-08-20 RX ORDER — VERAPAMIL HYDROCHLORIDE 120 MG/1
120 TABLET, FILM COATED, EXTENDED RELEASE ORAL EVERY MORNING
Status: DISCONTINUED | OUTPATIENT
Start: 2023-08-21 | End: 2023-08-23 | Stop reason: HOSPADM

## 2023-08-20 RX ORDER — ENOXAPARIN SODIUM 100 MG/ML
40 INJECTION SUBCUTANEOUS EVERY 24 HOURS
Status: DISCONTINUED | OUTPATIENT
Start: 2023-08-20 | End: 2023-08-23 | Stop reason: HOSPADM

## 2023-08-20 RX ORDER — GLUCAGON 1 MG
1 KIT INJECTION
Status: DISCONTINUED | OUTPATIENT
Start: 2023-08-20 | End: 2023-08-23 | Stop reason: HOSPADM

## 2023-08-20 RX ORDER — HYDROMORPHONE HYDROCHLORIDE 1 MG/ML
1 INJECTION, SOLUTION INTRAMUSCULAR; INTRAVENOUS; SUBCUTANEOUS
Status: COMPLETED | OUTPATIENT
Start: 2023-08-20 | End: 2023-08-20

## 2023-08-20 RX ORDER — LABETALOL 100 MG/1
200 TABLET, FILM COATED ORAL 2 TIMES DAILY
Status: DISCONTINUED | OUTPATIENT
Start: 2023-08-20 | End: 2023-08-23 | Stop reason: HOSPADM

## 2023-08-20 RX ORDER — HYDRALAZINE HYDROCHLORIDE 20 MG/ML
10 INJECTION INTRAMUSCULAR; INTRAVENOUS EVERY 6 HOURS PRN
Status: DISCONTINUED | OUTPATIENT
Start: 2023-08-20 | End: 2023-08-23 | Stop reason: HOSPADM

## 2023-08-20 RX ORDER — BUPRENORPHINE AND NALOXONE 2; .5 MG/1; MG/1
1 FILM, SOLUBLE BUCCAL; SUBLINGUAL 2 TIMES DAILY
Status: DISCONTINUED | OUTPATIENT
Start: 2023-08-20 | End: 2023-08-23 | Stop reason: HOSPADM

## 2023-08-20 RX ORDER — LAMOTRIGINE 100 MG/1
100 TABLET ORAL EVERY MORNING
Status: DISCONTINUED | OUTPATIENT
Start: 2023-08-21 | End: 2023-08-20

## 2023-08-20 RX ORDER — SODIUM CHLORIDE 9 MG/ML
INJECTION, SOLUTION INTRAVENOUS CONTINUOUS
Status: DISCONTINUED | OUTPATIENT
Start: 2023-08-20 | End: 2023-08-23 | Stop reason: HOSPADM

## 2023-08-20 RX ORDER — MUPIROCIN 20 MG/G
OINTMENT TOPICAL 2 TIMES DAILY
Status: DISCONTINUED | OUTPATIENT
Start: 2023-08-20 | End: 2023-08-23 | Stop reason: HOSPADM

## 2023-08-20 RX ORDER — LANOLIN ALCOHOL/MO/W.PET/CERES
800 CREAM (GRAM) TOPICAL
Status: DISCONTINUED | OUTPATIENT
Start: 2023-08-20 | End: 2023-08-23 | Stop reason: HOSPADM

## 2023-08-20 RX ORDER — IBUPROFEN 200 MG
24 TABLET ORAL
Status: DISCONTINUED | OUTPATIENT
Start: 2023-08-20 | End: 2023-08-23 | Stop reason: HOSPADM

## 2023-08-20 RX ORDER — IBUPROFEN 400 MG/1
400 TABLET ORAL EVERY 6 HOURS PRN
Status: DISCONTINUED | OUTPATIENT
Start: 2023-08-20 | End: 2023-08-23 | Stop reason: HOSPADM

## 2023-08-20 RX ORDER — BUPROPION HYDROCHLORIDE 150 MG/1
300 TABLET ORAL DAILY
Status: DISCONTINUED | OUTPATIENT
Start: 2023-08-21 | End: 2023-08-23 | Stop reason: HOSPADM

## 2023-08-20 RX ORDER — ESCITALOPRAM OXALATE 10 MG/1
10 TABLET ORAL DAILY
Status: DISCONTINUED | OUTPATIENT
Start: 2023-08-21 | End: 2023-08-23 | Stop reason: HOSPADM

## 2023-08-20 RX ORDER — GADOBUTROL 604.72 MG/ML
7.5 INJECTION INTRAVENOUS
Status: COMPLETED | OUTPATIENT
Start: 2023-08-20 | End: 2023-08-20

## 2023-08-20 RX ORDER — MAG HYDROX/ALUMINUM HYD/SIMETH 200-200-20
30 SUSPENSION, ORAL (FINAL DOSE FORM) ORAL 4 TIMES DAILY PRN
Status: DISCONTINUED | OUTPATIENT
Start: 2023-08-20 | End: 2023-08-23 | Stop reason: HOSPADM

## 2023-08-20 RX ORDER — NALOXONE HCL 0.4 MG/ML
0.02 VIAL (ML) INJECTION
Status: DISCONTINUED | OUTPATIENT
Start: 2023-08-20 | End: 2023-08-23 | Stop reason: HOSPADM

## 2023-08-20 RX ORDER — SODIUM,POTASSIUM PHOSPHATES 280-250MG
2 POWDER IN PACKET (EA) ORAL
Status: DISCONTINUED | OUTPATIENT
Start: 2023-08-20 | End: 2023-08-23 | Stop reason: HOSPADM

## 2023-08-20 RX ORDER — IBUPROFEN 200 MG
16 TABLET ORAL
Status: DISCONTINUED | OUTPATIENT
Start: 2023-08-20 | End: 2023-08-23 | Stop reason: HOSPADM

## 2023-08-20 RX ORDER — ONDANSETRON 2 MG/ML
4 INJECTION INTRAMUSCULAR; INTRAVENOUS EVERY 8 HOURS PRN
Status: DISCONTINUED | OUTPATIENT
Start: 2023-08-20 | End: 2023-08-23 | Stop reason: HOSPADM

## 2023-08-20 RX ORDER — LEVOTHYROXINE SODIUM 50 UG/1
50 TABLET ORAL
Status: DISCONTINUED | OUTPATIENT
Start: 2023-08-21 | End: 2023-08-23 | Stop reason: HOSPADM

## 2023-08-20 RX ORDER — SODIUM CHLORIDE 0.9 % (FLUSH) 0.9 %
10 SYRINGE (ML) INJECTION EVERY 12 HOURS PRN
Status: DISCONTINUED | OUTPATIENT
Start: 2023-08-20 | End: 2023-08-23 | Stop reason: HOSPADM

## 2023-08-20 RX ORDER — LAMOTRIGINE 25 MG/1
50 TABLET ORAL DAILY
Status: DISCONTINUED | OUTPATIENT
Start: 2023-08-21 | End: 2023-08-23 | Stop reason: HOSPADM

## 2023-08-20 RX ADMIN — HYDROMORPHONE HYDROCHLORIDE 1 MG: 1 INJECTION, SOLUTION INTRAMUSCULAR; INTRAVENOUS; SUBCUTANEOUS at 05:08

## 2023-08-20 RX ADMIN — LABETALOL HYDROCHLORIDE 200 MG: 100 TABLET, FILM COATED ORAL at 11:08

## 2023-08-20 RX ADMIN — MUPIROCIN: 20 OINTMENT TOPICAL at 11:08

## 2023-08-20 RX ADMIN — CEFTRIAXONE 1 G: 1 INJECTION, POWDER, FOR SOLUTION INTRAMUSCULAR; INTRAVENOUS at 05:08

## 2023-08-20 RX ADMIN — BUPRENORPHINE AND NALOXONE 1 FILM: 2; .5 FILM BUCCAL; SUBLINGUAL at 11:08

## 2023-08-20 RX ADMIN — GADOBUTROL 7.5 ML: 604.72 INJECTION INTRAVENOUS at 10:08

## 2023-08-20 RX ADMIN — DEXTROSE MONOHYDRATE 1000 MG: 50 INJECTION, SOLUTION INTRAVENOUS at 11:08

## 2023-08-20 RX ADMIN — ENOXAPARIN SODIUM 40 MG: 40 INJECTION SUBCUTANEOUS at 11:08

## 2023-08-20 RX ADMIN — SODIUM CHLORIDE: 9 INJECTION, SOLUTION INTRAVENOUS at 10:08

## 2023-08-20 NOTE — ED TRIAGE NOTES
Pt to ER with reports of dysuria and foul smelling oder x few days. Pt reports has been treated for UTI weeks prior and finished antibiotics. Pt with pt of MS and reports is in a flair up. Pt incontinent and states she has been having blood in urine with flank pain.  Pt reports inability move legs and left arm due to flare.  Pt denies CP, SOB, HA,

## 2023-08-20 NOTE — Clinical Note
Diagnosis: Multiple sclerosis [340.ICD-9-CM]   Admitting Provider:: ALEX PAULSON [[id number]]   Future Attending Provider: MAXIME SHEN [[id number]]   Reason for IP Medical Treatment  (Clinical interventions that can only be accomplished in the IP setting? ) :: 72 hours high does Steroids and Neurology consultation   I certify that Inpatient services for greater than or equal to 2 midnights are medically necessary:: Yes   Plans for Post-Acute care--if anticipated (pick the single best option):: A. No post acute care anticipated at this time

## 2023-08-20 NOTE — Clinical Note
Brice Reese accompanied their spouse to the emergency department on 8/20/2023. They may return to work on 08/21/2023.      If you have any questions or concerns, please don't hesitate to call.       DANN

## 2023-08-20 NOTE — ED PROVIDER NOTES
Encounter Date: 2023    SCRIBE #1 NOTE: I, Yen Nj, am scribing for, and in the presence of,  Candelario Berg MD. I have scribed the following portions of the note - Other sections scribed: HPI, ROS, PE, MDM.       History     Chief Complaint   Patient presents with    Dysuria     Pt to ER with reports of dysuria and foul smelling oder x few days. Pt reports has been treated for UTI weeks prior and finished antibiotics. Pt with hx of MS and reports is in a flair up. Pt incontinent      Janis Reese is a 42 y.o. female, with a PMHx of HTN, multiple sclerosis, who presents to the ED with dysuria, malodorous urine, and MS flare-up. Patient reports she was treated for a UTI with full course of antibiotics, which resolved. Pt states symptoms of UTI came back 4 days ago. Pt also states she has lost use of her left arm and cannot stand, which has happened in MS flare-ups before. Also notes abdominal pain over her bladder and kidneys. States she also experiences reoccurring UTIs.  No other exacerbating or alleviating factors. Denies any new visual or speech issues, or other associated symptoms.       The history is provided by the patient. No  was used.     Review of patient's allergies indicates:   Allergen Reactions    Metoclopramide hcl     Tramadol Anaphylaxis     Past Medical History:   Diagnosis Date    Depression     Hypertension     Multiple sclerosis      Past Surgical History:   Procedure Laterality Date     SECTION  2006    DILATION AND CURETTAGE OF UTERUS      LITHOTRIPSY      PORTACATH PLACEMENT Right 02/10/2020     Family History   Adopted: Yes     Social History     Tobacco Use    Smoking status: Former     Current packs/day: 0.00     Types: Vaping with nicotine    Smokeless tobacco: Former   Substance Use Topics    Alcohol use: Yes     Comment: occassionally    Drug use: Never     Review of Systems   Constitutional:  Negative for fever.   HENT:   Negative for sore throat.    Eyes:  Negative for visual disturbance.   Respiratory:  Negative for shortness of breath.    Cardiovascular:  Negative for chest pain.   Gastrointestinal:  Positive for abdominal pain.   Genitourinary:  Positive for dysuria.        (+) Malodorous urine.    Musculoskeletal:  Positive for myalgias (bilateral legs, left arm). Negative for back pain.   Skin:  Negative for rash.   Neurological:  Negative for headaches.       Physical Exam     Initial Vitals [08/20/23 1513]   BP Pulse Resp Temp SpO2   (!) 186/102 68 18 98.2 °F (36.8 °C) 99 %      MAP       --         Physical Exam    Nursing note and vitals reviewed.  Constitutional: She appears well-developed and well-nourished.   Eyes: EOM are normal. Pupils are equal, round, and reactive to light.   Neck: Neck supple. No thyromegaly present. No JVD present.   Normal range of motion.  Cardiovascular:  Normal rate, regular rhythm, normal heart sounds and intact distal pulses.     Exam reveals no gallop and no friction rub.       No murmur heard.  Pulmonary/Chest: Breath sounds normal. No respiratory distress.   Abdominal: Abdomen is soft. Bowel sounds are normal.   Musculoskeletal:      Cervical back: Normal range of motion and neck supple.      Comments: Chronic appearing weakness to bilateral lower extremities, bilateral upper arm weakness with L greater than R.      Neurological: She is alert and oriented to person, place, and time. She has normal strength.   Skin: Skin is warm and dry.         ED Course   Procedures  Labs Reviewed   URINALYSIS, REFLEX TO URINE CULTURE - Abnormal; Notable for the following components:       Result Value    Appearance, UA Hazy (*)     Protein, UA Trace (*)     Occult Blood UA 1+ (*)     Nitrite, UA Positive (*)     Urobilinogen, UA 2.0-3.0 (*)     Leukocytes, UA 3+ (*)     All other components within normal limits    Narrative:     Specimen Source->Urine   URINALYSIS MICROSCOPIC - Abnormal; Notable for the  following components:    RBC, UA 33 (*)     WBC, UA >100 (*)     Bacteria Moderate (*)     All other components within normal limits    Narrative:     Specimen Source->Urine   CBC W/ AUTO DIFFERENTIAL - Abnormal; Notable for the following components:    Lymph # 0.6 (*)     Gran % 81.3 (*)     Lymph % 11.1 (*)     All other components within normal limits   COMPREHENSIVE METABOLIC PANEL - Abnormal; Notable for the following components:    Sodium 130 (*)     CO2 21 (*)     Glucose 217 (*)     BUN 4 (*)      (*)      (*)     All other components within normal limits   MAGNESIUM - Abnormal; Notable for the following components:    Magnesium 1.5 (*)     All other components within normal limits   CULTURE, URINE   CULTURE, BLOOD   CULTURE, BLOOD   LACTIC ACID, PLASMA   HEPATITIS PANEL, ACUTE   POCT URINE PREGNANCY          Imaging Results              MRI Brain Without Contrast (In process)  Result time 08/20/23 22:20:46   Procedure changed from MRI Brain Demyelinating W W/O Contrast                    MRI Cervical Spine Demyelinating Without Contrast (In process)  Result time 08/20/23 22:21:16   Procedure changed from MRI Cervical Spine Demyelinating W W/O Contrast                    MRI Thoracic Spine Demyelinating W W/O Contrast (In process)                      Medications   methylPREDNISolone sodium succinate (SOLU-MEDROL) 1,000 mg in dextrose 5 % (D5W) 100 mL IVPB (has no administration in time range)   buprenorphine-naloxone 2-0.5 mg SL film 1 Film (has no administration in time range)   buPROPion TB24 tablet 300 mg (has no administration in time range)   hydrALAZINE injection 10 mg (has no administration in time range)   EScitalopram oxalate tablet 10 mg (has no administration in time range)   labetaloL tablet 200 mg (has no administration in time range)   lamoTRIgine tablet 50 mg (has no administration in time range)   levothyroxine tablet 50 mcg (has no administration in time range)   verapamiL CR  tablet 120 mg (has no administration in time range)   mupirocin 2 % ointment (has no administration in time range)   sodium chloride 0.9% flush 10 mL (has no administration in time range)   naloxone 0.4 mg/mL injection 0.02 mg (has no administration in time range)   glucose chewable tablet 16 g (has no administration in time range)   glucose chewable tablet 24 g (has no administration in time range)   glucagon (human recombinant) injection 1 mg (has no administration in time range)   enoxaparin injection 40 mg (has no administration in time range)   magnesium oxide tablet 800 mg (has no administration in time range)   potassium bicarbonate disintegrating tablet 50 mEq (has no administration in time range)   potassium bicarbonate disintegrating tablet 35 mEq (has no administration in time range)   potassium, sodium phosphates 280-160-250 mg packet 2 packet (has no administration in time range)   potassium, sodium phosphates 280-160-250 mg packet 2 packet (has no administration in time range)   ibuprofen tablet 400 mg (has no administration in time range)   ondansetron injection 4 mg (has no administration in time range)   aluminum-magnesium hydroxide-simethicone 200-200-20 mg/5 mL suspension 30 mL (has no administration in time range)   dextrose 10% bolus 125 mL 125 mL (has no administration in time range)   dextrose 10% bolus 250 mL 250 mL (has no administration in time range)   methylPREDNISolone sodium succinate (SOLU-MEDROL) 1,000 mg in dextrose 5 % (D5W) 100 mL IVPB (has no administration in time range)   cefTRIAXone (ROCEPHIN) 1 g in dextrose 5 % in water (D5W) 100 mL IVPB (MB+) (has no administration in time range)   0.9%  NaCl infusion (has no administration in time range)   cefTRIAXone (ROCEPHIN) 1 g in dextrose 5 % in water (D5W) 100 mL IVPB (MB+) (0 g Intravenous Stopped 8/20/23 1823)   HYDROmorphone injection 1 mg (1 mg Intravenous Given 8/20/23 1750)   gadobutroL (GADAVIST) injection 7.5 mL (7.5 mLs  Intravenous Given 8/20/23 1829)     Medical Decision Making  Amount and/or Complexity of Data Reviewed  Labs: ordered.  Radiology: ordered.    Risk  Prescription drug management.  Decision regarding hospitalization.    Patient has urinary tract infection.  Patient also has new left arm weakness and bilateral leg strength decrease concerning for acute MS flare.  Spoke to Internal Medicine who requests MRIs be performed today due to lack of hands on Neurology at this facility.  They did accept the admission.  Started Solu-Medrol 1000 mg in the ED. IV rocephin given. Pt is emptying her bladder with 23cc post void residual.         Scribe Attestation:   Scribe #1: I performed the above scribed service and the documentation accurately describes the services I performed. I attest to the accuracy of the note.              I, Candelario Coelho, personally performed the services described in this documentation. All medical record entries made by the scribe were at my direction and in my presence.  I have reviewed the chart and agree that the record reflects my personal performance and is accurate and complete.     Medical Decision Making:   History:   Old Medical Records: I decided to obtain old medical records.  Initial Assessment:   This is an emergent evaluation of a 42 y.o. female who presents with dysuria, malodorous urine, and MS flare-up. The patient was seen and examined. The history and physical exam was obtained. The nursing notes and vital signs were reviewed. Secondary to symptoms and examination findings, I ordered POCT uterine preganncy test, urine culture, urinalysis labs, MRI thoracic spine, MRI cervical spine, MRI brain demyelinating imaging.   Clinical Tests:   Lab Tests: Ordered and Reviewed  The following lab test(s) were unremarkable: UPT and Urinalysis  Radiological Study: Ordered and Reviewed      Clinical Impression:   Final diagnoses:  [G35] Multiple sclerosis  [N39.0] Urinary tract infection without  hematuria, site unspecified (Primary)        ED Disposition Condition    Admit Stable                Candelario Berg MD  08/20/23 4945

## 2023-08-20 NOTE — Clinical Note
Brice Reese accompanied their spouse to the emergency department on 8/20/2023. They may return to work on 08/22/2023.      If you have any questions or concerns, please don't hesitate to call.       DANN

## 2023-08-21 LAB
ANION GAP SERPL CALC-SCNC: 14 MMOL/L (ref 8–16)
BASOPHILS # BLD AUTO: 0.02 K/UL (ref 0–0.2)
BASOPHILS NFR BLD: 0.3 % (ref 0–1.9)
BUN SERPL-MCNC: 11 MG/DL (ref 6–20)
CALCIUM SERPL-MCNC: 9.2 MG/DL (ref 8.7–10.5)
CHLORIDE SERPL-SCNC: 99 MMOL/L (ref 95–110)
CO2 SERPL-SCNC: 18 MMOL/L (ref 23–29)
CREAT SERPL-MCNC: 1 MG/DL (ref 0.5–1.4)
DIFFERENTIAL METHOD: ABNORMAL
EOSINOPHIL # BLD AUTO: 0 K/UL (ref 0–0.5)
EOSINOPHIL NFR BLD: 0 % (ref 0–8)
ERYTHROCYTE [DISTWIDTH] IN BLOOD BY AUTOMATED COUNT: 12.1 % (ref 11.5–14.5)
EST. GFR  (NO RACE VARIABLE): >60 ML/MIN/1.73 M^2
GLUCOSE SERPL-MCNC: 576 MG/DL (ref 70–110)
HCT VFR BLD AUTO: 41 % (ref 37–48.5)
HGB BLD-MCNC: 13.5 G/DL (ref 12–16)
IMM GRANULOCYTES # BLD AUTO: 0.07 K/UL (ref 0–0.04)
IMM GRANULOCYTES NFR BLD AUTO: 1.1 % (ref 0–0.5)
LYMPHOCYTES # BLD AUTO: 0.4 K/UL (ref 1–4.8)
LYMPHOCYTES NFR BLD: 6.9 % (ref 18–48)
MAGNESIUM SERPL-MCNC: 1.7 MG/DL (ref 1.6–2.6)
MCH RBC QN AUTO: 30.7 PG (ref 27–31)
MCHC RBC AUTO-ENTMCNC: 32.9 G/DL (ref 32–36)
MCV RBC AUTO: 93 FL (ref 82–98)
MONOCYTES # BLD AUTO: 0.1 K/UL (ref 0.3–1)
MONOCYTES NFR BLD: 1.5 % (ref 4–15)
NEUTROPHILS # BLD AUTO: 5.5 K/UL (ref 1.8–7.7)
NEUTROPHILS NFR BLD: 90.2 % (ref 38–73)
NRBC BLD-RTO: 0 /100 WBC
PHOSPHATE SERPL-MCNC: 2.8 MG/DL (ref 2.7–4.5)
PLATELET # BLD AUTO: 173 K/UL (ref 150–450)
PLATELET BLD QL SMEAR: ABNORMAL
PMV BLD AUTO: 11.9 FL (ref 9.2–12.9)
POCT GLUCOSE: 320 MG/DL (ref 70–110)
POCT GLUCOSE: >500 MG/DL (ref 70–110)
POCT GLUCOSE: >500 MG/DL (ref 70–110)
POTASSIUM SERPL-SCNC: 4.3 MMOL/L (ref 3.5–5.1)
RBC # BLD AUTO: 4.4 M/UL (ref 4–5.4)
SODIUM SERPL-SCNC: 131 MMOL/L (ref 136–145)
WBC # BLD AUTO: 6.11 K/UL (ref 3.9–12.7)

## 2023-08-21 PROCEDURE — 99222 PR INITIAL HOSPITAL CARE,LEVL II: ICD-10-PCS | Mod: 95,,, | Performed by: PSYCHIATRY & NEUROLOGY

## 2023-08-21 PROCEDURE — 36415 COLL VENOUS BLD VENIPUNCTURE: CPT | Performed by: INTERNAL MEDICINE

## 2023-08-21 PROCEDURE — 84100 ASSAY OF PHOSPHORUS: CPT | Performed by: INTERNAL MEDICINE

## 2023-08-21 PROCEDURE — 80048 BASIC METABOLIC PNL TOTAL CA: CPT | Performed by: INTERNAL MEDICINE

## 2023-08-21 PROCEDURE — 25000003 PHARM REV CODE 250: Performed by: STUDENT IN AN ORGANIZED HEALTH CARE EDUCATION/TRAINING PROGRAM

## 2023-08-21 PROCEDURE — 63600175 PHARM REV CODE 636 W HCPCS: Performed by: INTERNAL MEDICINE

## 2023-08-21 PROCEDURE — 25000003 PHARM REV CODE 250: Performed by: EMERGENCY MEDICINE

## 2023-08-21 PROCEDURE — 63600175 PHARM REV CODE 636 W HCPCS: Performed by: STUDENT IN AN ORGANIZED HEALTH CARE EDUCATION/TRAINING PROGRAM

## 2023-08-21 PROCEDURE — 25000003 PHARM REV CODE 250: Performed by: INTERNAL MEDICINE

## 2023-08-21 PROCEDURE — 99222 1ST HOSP IP/OBS MODERATE 55: CPT | Mod: 95,,, | Performed by: PSYCHIATRY & NEUROLOGY

## 2023-08-21 PROCEDURE — 83735 ASSAY OF MAGNESIUM: CPT | Performed by: INTERNAL MEDICINE

## 2023-08-21 PROCEDURE — 80074 ACUTE HEPATITIS PANEL: CPT | Performed by: INTERNAL MEDICINE

## 2023-08-21 PROCEDURE — 83036 HEMOGLOBIN GLYCOSYLATED A1C: CPT | Performed by: INTERNAL MEDICINE

## 2023-08-21 PROCEDURE — 85025 COMPLETE CBC W/AUTO DIFF WBC: CPT | Performed by: INTERNAL MEDICINE

## 2023-08-21 PROCEDURE — 11000001 HC ACUTE MED/SURG PRIVATE ROOM

## 2023-08-21 RX ORDER — PHENAZOPYRIDINE HYDROCHLORIDE 100 MG/1
100 TABLET, FILM COATED ORAL
Status: DISCONTINUED | OUTPATIENT
Start: 2023-08-21 | End: 2023-08-23 | Stop reason: HOSPADM

## 2023-08-21 RX ORDER — INSULIN ASPART 100 [IU]/ML
8 INJECTION, SOLUTION INTRAVENOUS; SUBCUTANEOUS
Status: DISCONTINUED | OUTPATIENT
Start: 2023-08-22 | End: 2023-08-23 | Stop reason: HOSPADM

## 2023-08-21 RX ORDER — INSULIN ASPART 100 [IU]/ML
1-10 INJECTION, SOLUTION INTRAVENOUS; SUBCUTANEOUS
Status: DISCONTINUED | OUTPATIENT
Start: 2023-08-21 | End: 2023-08-23 | Stop reason: HOSPADM

## 2023-08-21 RX ORDER — CLONIDINE HYDROCHLORIDE 0.1 MG/1
0.1 TABLET ORAL 3 TIMES DAILY PRN
Status: DISCONTINUED | OUTPATIENT
Start: 2023-08-21 | End: 2023-08-23 | Stop reason: HOSPADM

## 2023-08-21 RX ORDER — SUMATRIPTAN SUCCINATE 25 MG/1
50 TABLET ORAL ONCE
Status: COMPLETED | OUTPATIENT
Start: 2023-08-21 | End: 2023-08-21

## 2023-08-21 RX ORDER — QUETIAPINE FUMARATE 100 MG/1
100 TABLET, FILM COATED ORAL NIGHTLY
Status: DISCONTINUED | OUTPATIENT
Start: 2023-08-21 | End: 2023-08-23 | Stop reason: HOSPADM

## 2023-08-21 RX ORDER — INSULIN ASPART 100 [IU]/ML
5 INJECTION, SOLUTION INTRAVENOUS; SUBCUTANEOUS
Status: DISCONTINUED | OUTPATIENT
Start: 2023-08-21 | End: 2023-08-21

## 2023-08-21 RX ORDER — ACETAMINOPHEN 325 MG/1
650 TABLET ORAL EVERY 6 HOURS PRN
Status: DISCONTINUED | OUTPATIENT
Start: 2023-08-21 | End: 2023-08-23 | Stop reason: HOSPADM

## 2023-08-21 RX ORDER — INSULIN ASPART 100 [IU]/ML
10 INJECTION, SOLUTION INTRAVENOUS; SUBCUTANEOUS ONCE
Status: COMPLETED | OUTPATIENT
Start: 2023-08-21 | End: 2023-08-21

## 2023-08-21 RX ADMIN — SUMATRIPTAN SUCCINATE 50 MG: 25 TABLET ORAL at 08:08

## 2023-08-21 RX ADMIN — IBUPROFEN 400 MG: 400 TABLET ORAL at 12:08

## 2023-08-21 RX ADMIN — BUPRENORPHINE AND NALOXONE 1 FILM: 2; .5 FILM BUCCAL; SUBLINGUAL at 08:08

## 2023-08-21 RX ADMIN — BUPRENORPHINE AND NALOXONE 1 FILM: 2; .5 FILM BUCCAL; SUBLINGUAL at 10:08

## 2023-08-21 RX ADMIN — HYDRALAZINE HYDROCHLORIDE 10 MG: 20 INJECTION INTRAMUSCULAR; INTRAVENOUS at 05:08

## 2023-08-21 RX ADMIN — MUPIROCIN: 20 OINTMENT TOPICAL at 08:08

## 2023-08-21 RX ADMIN — CEFTRIAXONE 1 G: 1 INJECTION, POWDER, FOR SOLUTION INTRAMUSCULAR; INTRAVENOUS at 04:08

## 2023-08-21 RX ADMIN — METHYLPREDNISOLONE SODIUM SUCCINATE 1000 MG: 1 INJECTION, POWDER, LYOPHILIZED, FOR SOLUTION INTRAMUSCULAR; INTRAVENOUS at 11:08

## 2023-08-21 RX ADMIN — IBUPROFEN 400 MG: 400 TABLET ORAL at 10:08

## 2023-08-21 RX ADMIN — INSULIN DETEMIR 15 UNITS: 100 INJECTION, SOLUTION SUBCUTANEOUS at 01:08

## 2023-08-21 RX ADMIN — PHENAZOPYRIDINE HYDROCHLORIDE 100 MG: 100 TABLET ORAL at 04:08

## 2023-08-21 RX ADMIN — LABETALOL HYDROCHLORIDE 200 MG: 100 TABLET, FILM COATED ORAL at 10:08

## 2023-08-21 RX ADMIN — LABETALOL HYDROCHLORIDE 200 MG: 100 TABLET, FILM COATED ORAL at 08:08

## 2023-08-21 RX ADMIN — ESCITALOPRAM OXALATE 10 MG: 10 TABLET ORAL at 10:08

## 2023-08-21 RX ADMIN — LAMOTRIGINE 50 MG: 25 TABLET ORAL at 11:08

## 2023-08-21 RX ADMIN — PHENAZOPYRIDINE HYDROCHLORIDE 100 MG: 100 TABLET ORAL at 12:08

## 2023-08-21 RX ADMIN — BUPROPION HYDROCHLORIDE 300 MG: 150 TABLET, FILM COATED, EXTENDED RELEASE ORAL at 10:08

## 2023-08-21 RX ADMIN — INSULIN ASPART 10 UNITS: 100 INJECTION, SOLUTION INTRAVENOUS; SUBCUTANEOUS at 04:08

## 2023-08-21 RX ADMIN — ENOXAPARIN SODIUM 40 MG: 40 INJECTION SUBCUTANEOUS at 04:08

## 2023-08-21 RX ADMIN — CLONIDINE HYDROCHLORIDE 0.1 MG: 0.1 TABLET ORAL at 12:08

## 2023-08-21 RX ADMIN — QUETIAPINE 100 MG: 100 TABLET ORAL at 08:08

## 2023-08-21 RX ADMIN — INSULIN ASPART 5 UNITS: 100 INJECTION, SOLUTION INTRAVENOUS; SUBCUTANEOUS at 04:08

## 2023-08-21 RX ADMIN — MUPIROCIN: 20 OINTMENT TOPICAL at 10:08

## 2023-08-21 RX ADMIN — LEVOTHYROXINE SODIUM 50 MCG: 50 TABLET ORAL at 05:08

## 2023-08-21 RX ADMIN — HYDRALAZINE HYDROCHLORIDE 10 MG: 20 INJECTION INTRAMUSCULAR; INTRAVENOUS at 11:08

## 2023-08-21 RX ADMIN — VERAPAMIL HYDROCHLORIDE 120 MG: 120 TABLET, FILM COATED, EXTENDED RELEASE ORAL at 07:08

## 2023-08-21 RX ADMIN — QUETIAPINE 100 MG: 100 TABLET ORAL at 12:08

## 2023-08-21 RX ADMIN — INSULIN ASPART 8 UNITS: 100 INJECTION, SOLUTION INTRAVENOUS; SUBCUTANEOUS at 08:08

## 2023-08-21 NOTE — SUBJECTIVE & OBJECTIVE
Past Medical History:   Diagnosis Date    Depression     Hypertension     Multiple sclerosis        Past Surgical History:   Procedure Laterality Date     SECTION  2006    DILATION AND CURETTAGE OF UTERUS      LITHOTRIPSY      PORTACATH PLACEMENT Right 02/10/2020       Review of patient's allergies indicates:   Allergen Reactions    Metoclopramide hcl     Tramadol Anaphylaxis       No current facility-administered medications on file prior to encounter.     Current Outpatient Medications on File Prior to Encounter   Medication Sig    buprenorphine-naloxone 8-2 mg (SUBOXONE) 8-2 mg Place 1 each under the tongue 2 (two) times a day.    buPROPion (WELLBUTRIN XL) 300 MG 24 hr tablet Take 300 mg by mouth once daily.    cloNIDine (CATAPRES) 0.1 MG tablet Take 0.1 mg by mouth 3 (three) times daily as needed (withdrawal, anxiety, insomnia).    EScitalopram oxalate (LEXAPRO) 10 MG tablet Take 10 mg by mouth once daily.    KESIMPTA PEN 20 mg/0.4 mL PnIj SMARTSI Milligram(s) SUB-Q Once a Month    lamoTRIgine (LAMICTAL) 100 MG tablet Take 100 mg by mouth every morning.    levothyroxine (SYNTHROID) 50 MCG tablet TAKE 1 TABLET BY MOUTH BEFORE BREAKFAST.    QUEtiapine (SEROQUEL) 100 MG Tab Take 1 tablet (100 mg total) by mouth every evening.    tiZANidine (ZANAFLEX) 4 MG tablet TAKE 1 TABLET BY MOUTH EVERY 8 HOURS    verapamiL (CALAN-SR) 120 MG CR tablet TAKE 1 TABLET BY MOUTH EVERY MORNING BEFORE BREAKFAST    [DISCONTINUED] labetaloL (NORMODYNE) 200 MG tablet Take 200 mg by mouth 2 (two) times daily.    [DISCONTINUED] lamoTRIgine 50 mg TbDL Take 50 mg by mouth every evening.     Family History    None       Tobacco Use    Smoking status: Former     Current packs/day: 0.00     Types: Vaping with nicotine    Smokeless tobacco: Former   Substance and Sexual Activity    Alcohol use: Yes     Comment: occassionally    Drug use: Never    Sexual activity: Not on file     Review of Systems  12 point systems were reviewed  and negative except as mentioned in the HPI section.    Objective:     Vital Signs (Most Recent):  Temp: 98.9 °F (37.2 °C) (08/20/23 1950)  Pulse: 61 (08/20/23 1933)  Resp: 18 (08/20/23 1933)  BP: 132/88 (08/20/23 1933)  SpO2: 97 % (08/20/23 1933) Vital Signs (24h Range):  Temp:  [98.2 °F (36.8 °C)-98.9 °F (37.2 °C)] 98.9 °F (37.2 °C)  Pulse:  [56-68] 61  Resp:  [16-18] 18  SpO2:  [96 %-99 %] 97 %  BP: (132-186)/() 132/88     Weight: 68 kg (150 lb)  Body mass index is 29.29 kg/m².     Physical Exam   General: Alert ,no apparent cardiorespiratory distress, lying comfortably  Eye: PERRL, normal conjunctiva  HEENT: Normocephalic, atraumatic, dry  oral mucosa  Neck: Supple  Respiratory: Lungs CTA, equal breath sounds, symmetric expansion, no chest wall tenderness  Cardiovascular: Normal rate, regular rhythm, no appreciable murmurs, rubs or gallops, no peripheral edema, good pulses and equal in all extremities.  Gastrointestinal: Soft, non-tender, non-distended, normal bowel sounds  Genitourinary: Deferred  Musculoskeletal and Neurologic:    LUE ?, RUE ?, bilateral LE ?.  Psychiatric: Appropriate mood and affect.           Significant Labs: All pertinent labs within the past 24 hours have been reviewed.  CBC:   Recent Labs   Lab 08/20/23  1738   WBC 5.74   HGB 13.1   HCT 38.1        CMP:   Recent Labs   Lab 08/20/23  1738   *   K 4.8   CL 99   CO2 21*   *   BUN 4*   CREATININE 0.8   CALCIUM 8.8   PROT 7.1   ALBUMIN 3.7   BILITOT 0.6   ALKPHOS 129   *   *   ANIONGAP 10       Significant Imaging: I have reviewed all pertinent imaging results/findings within the past 24 hours.  I have reviewed and interpreted all pertinent imaging results/findings within the past 24 hours.

## 2023-08-21 NOTE — CONSULTS
[facility]  Neurology  Consult Note    Patient Name: Janis Reese  MRN: [id number]  Admission Date: 2023  Hospital Length of Stay: 1 days  Code Status: Full Code   Attending Provider: Fam Reese MD   Consulting Provider: Maxime Ford MD  Primary Care Physician: Rula Betancourt MD  Principal Problem:Multiple sclerosis exacerbation    Inpatient consult to Telemedicine-General Neurology  Consult performed by: Maxime Ford MD  Consult ordered by: Mil Martin MD        Subjective:     Chief Complaint:  MS relapse     HPI: 43 y/o female with medical HX of MS comes to ED due to LUE and weakness f LE's. Pt tells me that she mobilizes in wheelchair but is able to stand up; for the last day she has no been able to do that. She follow up with Dr Acevedo at [facility]. On [place].  Denies visual or speech disturbances, vertigo. At baseline she does have bladder incontinence.    Past Medical History:   Diagnosis Date    Depression     Hypertension     Multiple sclerosis        Past Surgical History:   Procedure Laterality Date     SECTION  2006    DILATION AND CURETTAGE OF UTERUS      LITHOTRIPSY      PORTACATH PLACEMENT Right 02/10/2020       Review of patient's allergies indicates:   Allergen Reactions    Metoclopramide hcl     Tramadol Anaphylaxis       Current Neurological Medications:     No current facility-administered medications on file prior to encounter.     Current Outpatient Medications on File Prior to Encounter   Medication Sig    buprenorphine-naloxone 8-2 mg (SUBOXONE) 8-2 mg Place 1 each under the tongue 2 (two) times a day.    buPROPion (WELLBUTRIN XL) 300 MG 24 hr tablet Take 300 mg by mouth once daily.    cloNIDine (CATAPRES) 0.1 MG tablet Take 0.1 mg by mouth 3 (three) times daily as needed (withdrawal, anxiety, insomnia).    EScitalopram oxalate (LEXAPRO) 10 MG tablet Take 10 mg by mouth once daily.    KESIMPTA PEN 20 mg/0.4 mL PnIj SMARTSI Milligram(s)  SUB-Q Once a Month    lamoTRIgine (LAMICTAL) 100 MG tablet Take 100 mg by mouth every morning.    levothyroxine (SYNTHROID) 50 MCG tablet TAKE 1 TABLET BY MOUTH BEFORE BREAKFAST.    QUEtiapine (SEROQUEL) 100 MG Tab Take 1 tablet (100 mg total) by mouth every evening.    tiZANidine (ZANAFLEX) 4 MG tablet TAKE 1 TABLET BY MOUTH EVERY 8 HOURS    verapamiL (CALAN-SR) 120 MG CR tablet TAKE 1 TABLET BY MOUTH EVERY MORNING BEFORE BREAKFAST      Family History    None       Tobacco Use    Smoking status: Former     Current packs/day: 0.00     Types: Vaping with nicotine    Smokeless tobacco: Former   Substance and Sexual Activity    Alcohol use: Yes     Comment: occassionally    Drug use: Never    Sexual activity: Not on file     Review of Systems   Constitutional:  Negative for fever.   HENT:  Negative for trouble swallowing.    Eyes:  Negative for photophobia.   Respiratory:  Negative for shortness of breath.    Cardiovascular:  Negative for chest pain.   Gastrointestinal:  Negative for abdominal pain.   Genitourinary:  Negative for flank pain.   Musculoskeletal:  Negative for back pain.   Neurological:  Negative for seizures.   Psychiatric/Behavioral:  Negative for confusion.      Objective:     Vital Signs (Most Recent):  Temp: 98.1 °F (36.7 °C) (08/21/23 1551)  Pulse: 61 (08/21/23 1551)  Resp: 20 (08/21/23 1551)  BP: (!) 174/103 (08/21/23 1551)  SpO2: 96 % (08/21/23 1551) Vital Signs (24h Range):  Temp:  [97.6 °F (36.4 °C)-99 °F (37.2 °C)] 98.1 °F (36.7 °C)  Pulse:  [56-71] 61  Resp:  [16-20] 20  SpO2:  [96 %-97 %] 96 %  BP: (132-174)/() 174/103     Weight: 68 kg (150 lb)  Body mass index is 29.29 kg/m².    Physical Exam  Constitutional:       General: She is not in acute distress.  Pulmonary:      Effort: No respiratory distress.   Neurological:      Mental Status: She is oriented to person, place, and time.   Psychiatric:         Speech: Speech normal.         NEUROLOGICAL EXAMINATION:     MENTAL STATUS  "  Oriented to person, place, and time.   Speech: speech is normal   Level of consciousness: alert    CRANIAL NERVES     CN III, IV, VI   Conjugate gaze: present    CN VII   Right facial weakness: none  Left facial weakness: none    CN XII   Tongue deviation: none    MOTOR EXAM        AROM of UE's against gravity  Slight AROM of LE's against gravity       Significant Labs: CBC:   Recent Labs   Lab 08/20/23 1738 08/21/23  1230   WBC 5.74 6.11   HGB 13.1 13.5   HCT 38.1 41.0    173     CMP:   Recent Labs   Lab 08/20/23  1738 08/21/23  1230   * 576*   * 131*   K 4.8 4.3   CL 99 99   CO2 21* 18*   BUN 4* 11   CREATININE 0.8 1.0   CALCIUM 8.8 9.2   MG 1.5* 1.7   PROT 7.1  --    ALBUMIN 3.7  --    BILITOT 0.6  --    ALKPHOS 129  --    *  --    *  --    ANIONGAP 10 14     Inflammatory Markers: No results for input(s): "SEDRATE", "CRP", "PROCAL" in the last 48 hours.  Urine Culture:   Recent Labs   Lab 08/20/23  1546   LABURIN PRESUMPTIVE E COLI  >100,000 cfu/ml  Identification and susceptibility pending  *     Urine Studies:   Recent Labs   Lab 08/20/23  1546   COLORU Yellow   APPEARANCEUA Hazy*   PHUR 7.0   SPECGRAV 1.020   PROTEINUA Trace*   GLUCUA Negative   KETONESU Negative   BILIRUBINUA Negative   OCCULTUA 1+*   NITRITE Positive*   UROBILINOGEN 2.0-3.0*   LEUKOCYTESUR 3+*   RBCUA 33*   WBCUA >100*   BACTERIA Moderate*   SQUAMEPITHEL 3       Significant Imaging: I have reviewed all pertinent imaging results/findings within the past 24 hours.    MRI  Impression:     Limited MRI of the brain demonstrating new white matter lesions compared to examination dated 11/29/2012.  No diffusion restriction to suggest acute disease.        Electronically signed by: Sunny Ya MD  Date:                                            08/20/2023  Time:                                           23:29    Assessment and Plan:     41 y/o female with MS    MS: possible relapse due to UTI? MRI brain and " spine showed multiple lesion consistent with Hx of MS. Uncertain if active lesions as no contrast was administered. Feeling better with steroids and antibiotics. No recent images to compare.  Methylprednisolone 1000 mg daily x three days.  Follow up with Dr. Acevedo at [facility].    Active Diagnoses:    Diagnosis Date Noted POA    PRINCIPAL PROBLEM:  Multiple sclerosis exacerbation [G35] 08/20/2023 Yes    Hyponatremia [E87.1] 08/20/2023 Yes    Transaminitis [R74.01] 08/20/2023 Yes    Hypomagnesemia [E83.42] 08/20/2023 Yes    Acute UTI (urinary tract infection) [N39.0] 08/20/2023 Yes    Hypothyroidism [E03.9] 08/20/2023 Yes    Chronic pain [G89.29] 05/15/2023 Yes    Essential hypertension [I10] 08/03/2020 Yes    Bipolar 1 disorder [F31.9] 08/03/2020 Yes      Problems Resolved During this Admission:       VTE Risk Mitigation (From admission, onward)           Ordered     enoxaparin injection 40 mg  Daily         08/20/23 1956     IP VTE HIGH RISK PATIENT  Once         08/20/23 1956     Place sequential compression device  Until discontinued         08/20/23 1956                    Thank you for your consult. I will follow-up with patient. Please contact us if you have any additional questions.    Maxime Ford MD  Neurology  [facility]

## 2023-08-21 NOTE — SUBJECTIVE & OBJECTIVE
Interval History: dysuria is improving some, still having bladder pain.    Review of Systems  Objective:     Vital Signs (Most Recent):  Temp: 97.6 °F (36.4 °C) (08/21/23 0451)  Pulse: (!) 59 (08/21/23 0451)  Resp: 17 (08/21/23 0451)  BP: (!) 164/92 (08/21/23 0451)  SpO2: 97 % (08/21/23 0451) Vital Signs (24h Range):  Temp:  [97.6 °F (36.4 °C)-99 °F (37.2 °C)] 97.6 °F (36.4 °C)  Pulse:  [56-71] 59  Resp:  [16-18] 17  SpO2:  [96 %-99 %] 97 %  BP: (132-186)/() 164/92     Weight: 68 kg (150 lb)  Body mass index is 29.29 kg/m².    Intake/Output Summary (Last 24 hours) at 8/21/2023 0909  Last data filed at 8/21/2023 0450  Gross per 24 hour   Intake 415.21 ml   Output 1300 ml   Net -884.79 ml         Physical Exam  Vitals and nursing note reviewed.   Constitutional:       Appearance: Normal appearance.   Cardiovascular:      Rate and Rhythm: Normal rate and regular rhythm.      Pulses: Normal pulses.   Abdominal:      General: Bowel sounds are normal. There is no distension.   Musculoskeletal:         General: No swelling.   Skin:     General: Skin is warm.   Neurological:      Mental Status: She is alert.      Comments: Left lower extremity weakness, mild upper left extremity weakness             Significant Labs: All pertinent labs within the past 24 hours have been reviewed.    Significant Imaging: I have reviewed all pertinent imaging results/findings within the past 24 hours.

## 2023-08-21 NOTE — PHARMACY MED REC
"Admission Medication History     The home medication history was taken by Peggy Ivy.    You may go to "Admission" then "Reconcile Home Medications" tabs to review and/or act upon these items.     The home medication list has been updated by the Pharmacy department.   Please read ALL comments highlighted in yellow.   Please address this information as you see fit.    Feel free to contact us if you have any questions or require assistance.      The medications listed below were removed from the home medication list. Please reorder if appropriate:  Patient reports no longer taking the following medication(s):  Labetalol    Medications listed below were obtained from: Patient/family    The medication reconciliation was completed by the patient's bedside.      Peggy Ivy  [phone]                .          "

## 2023-08-21 NOTE — PROGRESS NOTES
[facility] Medicine  Progress Note    Patient Name: Janis Reese  MRN: [id number]  Patient Class: IP- Inpatient   Admission Date: 8/20/2023  Length of Stay: 1 days  Attending Physician: Fam Reese MD  Primary Care Provider: Rula Betancourt MD        Subjective:     Principal Problem:Multiple sclerosis exacerbation        HPI:  Ms Reese is a   Pleasant 42-year-old  female being admitted for evaluation of MS flare.  Her medical history significant for multiple sclerosis, hypertension, chronic pain, hypothyroidism, bipolar disorder.  She reports having neurology follow-up with Dr. Gordon Sutton in [place] and has been on Kesimpta  for last about 3 to 4 years for her MS.  States that at baseline she is wheelchair-bound, has lower extremity weakness but is able to stand up and get on the wheelchair by herself.  She reports being left-handed.  Reports that over last about 24 hours she has had increasing left upper extremity weakness, markedly decreased strength in lower extremity more so than her baseline and her  has to carry her to put her on the wheelchair, and some right-sided weakness as well.  Also notes that about 4 days she has been having significant dysuria with burning and reddish urine.  Reports that she was treated with antibiotics about 1 month ago for UTI with some improvement of her symptoms but now her symptoms have markedly gotten worse associated with suprapubic pain.  Denies any fevers or chills, chest pain, shortness of breath, nausea vomiting, diarrhea.    In the ER, she has been afebrile her vitals are stable except mildly uncontrolled hypertension.  Her CBC reveals normal white count hemoglobin and platelets.  Her BMP shows mild hyponatremia 130 normal renal function, hypomagnesemia 1.5.  Transaminitis AST: ALT-136: 170 which is a new finding for her compared to previous transaminases from May of this year which were normal.  Lactate is 1.9.   Her UA is consistent with infection.  She has received IV Rocephin and 1 g of IV Solu-Medrol in the ER, he is being admitted for further evaluation and treatment.        Overview/Hospital Course:  No notes on file    Interval History: dysuria is improving some, still having bladder pain.    Review of Systems  Objective:     Vital Signs (Most Recent):  Temp: 97.6 °F (36.4 °C) (08/21/23 0451)  Pulse: (!) 59 (08/21/23 0451)  Resp: 17 (08/21/23 0451)  BP: (!) 164/92 (08/21/23 0451)  SpO2: 97 % (08/21/23 0451) Vital Signs (24h Range):  Temp:  [97.6 °F (36.4 °C)-99 °F (37.2 °C)] 97.6 °F (36.4 °C)  Pulse:  [56-71] 59  Resp:  [16-18] 17  SpO2:  [96 %-99 %] 97 %  BP: (132-186)/() 164/92     Weight: 68 kg (150 lb)  Body mass index is 29.29 kg/m².    Intake/Output Summary (Last 24 hours) at 8/21/2023 0909  Last data filed at 8/21/2023 0450  Gross per 24 hour   Intake 415.21 ml   Output 1300 ml   Net -884.79 ml         Physical Exam  Vitals and nursing note reviewed.   Constitutional:       Appearance: Normal appearance.   Cardiovascular:      Rate and Rhythm: Normal rate and regular rhythm.      Pulses: Normal pulses.   Abdominal:      General: Bowel sounds are normal. There is no distension.   Musculoskeletal:         General: No swelling.   Skin:     General: Skin is warm.   Neurological:      Mental Status: She is alert.      Comments: Left lower extremity weakness, mild upper left extremity weakness             Significant Labs: All pertinent labs within the past 24 hours have been reviewed.    Significant Imaging: I have reviewed all pertinent imaging results/findings within the past 24 hours.      Assessment/Plan:      * Multiple sclerosis exacerbation  Likely precipitated by acute UTI.  Continue pulse dose steroids 1 g Solu-Medrol for 3 days.  MRI brain ordered, pending at the time of this admission.  Neurology consultation requested.  Fall, aspiration precautions.  Zanaflex on hold      Hypothyroidism  Continue  Synthroid      Acute UTI (urinary tract infection)  Continue IV Rocephin  Follow cultures, sensitivities        Hypomagnesemia  Replace per protocol.  Recent Labs   Lab 08/20/23  1738   MG 1.5*        Transaminitis  New compared to prior.  Check RUQ US and, and acute hepatitis panel.      Hyponatremia  Hypovolemic  Continue gentle IV fluids.    Recent Labs   Lab 08/20/23  1738   *       Chronic pain  On home Suboxone.  Will continue.   queried.      Bipolar 1 disorder  Continue home medications      Essential hypertension  Continue Home dose labetalol and verapamil, and PRN IV hydralazine.        VTE Risk Mitigation (From admission, onward)         Ordered     enoxaparin injection 40 mg  Daily         08/20/23 1956     IP VTE HIGH RISK PATIENT  Once         08/20/23 1956     Place sequential compression device  Until discontinued         08/20/23 1956                Discharge Planning   ROSA:      Code Status: Full Code   Is the patient medically ready for discharge?:     Reason for patient still in hospital (select all that apply): Treatment  Discharge Plan A: Home with family                  Fam Reese MD  Department of Hospital Medicine   [facility]

## 2023-08-21 NOTE — ASSESSMENT & PLAN NOTE
Likely precipitated by acute UTI.  Continue pulse dose steroids 1 g Solu-Medrol for 3 days.  MRI brain ordered, pending at the time of this admission.  Neurology consultation requested.  Fall, aspiration precautions.  Zanaflex on hold

## 2023-08-21 NOTE — PLAN OF CARE
Case Management Assessment     PCP: Asia Horta  Pharmacy: [facility]    Patient Arrived From: home  Existing Help at Home: spouse    Barriers to Discharge: none    Discharge Plan:    A. Home with family   B. Home with family    SW completed initial assessment and discussed discharge planning with patient and her spouse at her bedside. Patient lives with her spouse who is her main support. Patient's spouse will provide transportation for her to get home when discharge from the hospital.     08/20/23 2021   Discharge Assessment   Assessment Type Discharge Planning Assessment   Confirmed/corrected address, phone number and insurance Yes   Confirmed Demographics Correct on Facesheet   Source of Information patient   When was your last doctors appointment?   (patient stated that she visited her pcp about 3 months ago.)   Communicated ROSA with patient/caregiver Date not available/Unable to determine   Reason For Admission Multiple Sclerosis Exarcerbation   People in Home spouse   Do you expect to return to your current living situation? Yes   Do you have help at home or someone to help you manage your care at home? Yes   Who are your caregiver(s) and their phone number(s)? Lane Reese (Spouse)   [phone] (Mobile)   Prior to hospitilization cognitive status: Alert/Oriented   Current cognitive status: Alert/Oriented   Equipment Currently Used at Home walker, rolling;wheelchair   Readmission within 30 days? No   Patient currently being followed by outpatient case management? No   Do you currently have service(s) that help you manage your care at home? No   Do you take prescription medications? Yes   Do you have prescription coverage? Yes   Coverage Medicaid   Do you have any problems affording any of your prescribed medications? No   Is the patient taking medications as prescribed? yes   Who is going to help you get home at discharge? Lane Reese (Spouse)   [phone] (Mobile)   How do you get to doctors  appointments? family or friend will provide   Are you on dialysis? No   Do you take coumadin? No   DME Needed Upon Discharge  none   Discharge Plan discussed with: Patient;Spouse/sig other   Name(s) and Number(s) Lane Reese (Spouse)   [phone] (Mobile)   Transition of Care Barriers None   Discharge Plan A Home with family   Discharge Plan B Home with family   OTHER   Name(s) of People in Home Lane Reese (Spouse)   [phone] (Mobile)

## 2023-08-21 NOTE — HPI
Ms Reese is a   Pleasant 42-year-old  female being admitted for evaluation of MS flare.  Her medical history significant for multiple sclerosis, hypertension, chronic pain, hypothyroidism, bipolar disorder.  She reports having neurology follow-up with Dr. Gordon Sutton in [place] and has been on Kesimpta  for last about 3 to 4 years for her MS.  States that at baseline she is wheelchair-bound, has lower extremity weakness but is able to stand up and get on the wheelchair by herself.  She reports being left-handed.  Reports that over last about 24 hours she has had increasing left upper extremity weakness, markedly decreased strength in lower extremity more so than her baseline and her  has to carry her to put her on the wheelchair, and some right-sided weakness as well.  Also notes that about 4 days she has been having significant dysuria with burning and reddish urine.  Reports that she was treated with antibiotics about 1 month ago for UTI with some improvement of her symptoms but now her symptoms have markedly gotten worse associated with suprapubic pain.  Denies any fevers or chills, chest pain, shortness of breath, nausea vomiting, diarrhea.    In the ER, she has been afebrile her vitals are stable except mildly uncontrolled hypertension.  Her CBC reveals normal white count hemoglobin and platelets.  Her BMP shows mild hyponatremia 130 normal renal function, hypomagnesemia 1.5.  Transaminitis AST: ALT-136: 170 which is a new finding for her compared to previous transaminases from May of this year which were normal.  Lactate is 1.9.  Her UA is consistent with infection.  She has received IV Rocephin and 1 g of IV Solu-Medrol in the ER, he is being admitted for further evaluation and treatment.

## 2023-08-21 NOTE — H&P
[facility]  [facility] Medicine  History & Physical    Patient Name: Janis Reese  MRN: [id number]  Patient Class: IP- Inpatient  Admission Date: 8/20/2023  Attending Physician: Candelario Berg MD   Primary Care Provider: Rula Betancourt MD         Patient information was obtained from patient, spouse/SO and ER records.     Subjective:     Principal Problem:Multiple sclerosis exacerbation    Chief Complaint:   Chief Complaint   Patient presents with    Dysuria     Pt to ER with reports of dysuria and foul smelling oder x few days. Pt reports has been treated for UTI weeks prior and finished antibiotics. Pt with hx of MS and reports is in a flair up. Pt incontinent         HPI: Ms Reese is a   Pleasant 42-year-old  female being admitted for evaluation of MS flare.  Her medical history significant for multiple sclerosis, hypertension, chronic pain, hypothyroidism, bipolar disorder.  She reports having neurology follow-up with Dr. Gordon Sutton in [place] and has been on Kesimpta  for last about 3 to 4 years for her MS.  States that at baseline she is wheelchair-bound, has lower extremity weakness but is able to stand up and get on the wheelchair by herself.  She reports being left-handed.  Reports that over last about 24 hours she has had increasing left upper extremity weakness, markedly decreased strength in lower extremity more so than her baseline and her  has to carry her to put her on the wheelchair, and some right-sided weakness as well.  Also notes that about 4 days she has been having significant dysuria with burning and reddish urine.  Reports that she was treated with antibiotics about 1 month ago for UTI with some improvement of her symptoms but now her symptoms have markedly gotten worse associated with suprapubic pain.  Denies any fevers or chills, chest pain, shortness of breath, nausea vomiting, diarrhea.    In the ER, she has been afebrile her vitals  are stable except mildly uncontrolled hypertension.  Her CBC reveals normal white count hemoglobin and platelets.  Her BMP shows mild hyponatremia 130 normal renal function, hypomagnesemia 1.5.  Transaminitis AST: ALT-136: 170 which is a new finding for her compared to previous transaminases from May of this year which were normal.  Lactate is 1.9.  Her UA is consistent with infection.  She has received IV Rocephin and 1 g of IV Solu-Medrol in the ER, he is being admitted for further evaluation and treatment.        Past Medical History:   Diagnosis Date    Depression     Hypertension     Multiple sclerosis        Past Surgical History:   Procedure Laterality Date     SECTION  2006    DILATION AND CURETTAGE OF UTERUS      LITHOTRIPSY      PORTACATH PLACEMENT Right 02/10/2020       Review of patient's allergies indicates:   Allergen Reactions    Metoclopramide hcl     Tramadol Anaphylaxis       No current facility-administered medications on file prior to encounter.     Current Outpatient Medications on File Prior to Encounter   Medication Sig    buprenorphine-naloxone 8-2 mg (SUBOXONE) 8-2 mg Place 1 each under the tongue 2 (two) times a day.    buPROPion (WELLBUTRIN XL) 300 MG 24 hr tablet Take 300 mg by mouth once daily.    cloNIDine (CATAPRES) 0.1 MG tablet Take 0.1 mg by mouth 3 (three) times daily as needed (withdrawal, anxiety, insomnia).    EScitalopram oxalate (LEXAPRO) 10 MG tablet Take 10 mg by mouth once daily.    KESIMPTA PEN 20 mg/0.4 mL PnIj SMARTSI Milligram(s) SUB-Q Once a Month    lamoTRIgine (LAMICTAL) 100 MG tablet Take 100 mg by mouth every morning.    levothyroxine (SYNTHROID) 50 MCG tablet TAKE 1 TABLET BY MOUTH BEFORE BREAKFAST.    QUEtiapine (SEROQUEL) 100 MG Tab Take 1 tablet (100 mg total) by mouth every evening.    tiZANidine (ZANAFLEX) 4 MG tablet TAKE 1 TABLET BY MOUTH EVERY 8 HOURS    verapamiL (CALAN-SR) 120 MG CR tablet TAKE 1 TABLET BY MOUTH EVERY  MORNING BEFORE BREAKFAST    [DISCONTINUED] labetaloL (NORMODYNE) 200 MG tablet Take 200 mg by mouth 2 (two) times daily.    [DISCONTINUED] lamoTRIgine 50 mg TbDL Take 50 mg by mouth every evening.     Family History    None       Tobacco Use    Smoking status: Former     Current packs/day: 0.00     Types: Vaping with nicotine    Smokeless tobacco: Former   Substance and Sexual Activity    Alcohol use: Yes     Comment: occassionally    Drug use: Never    Sexual activity: Not on file     Review of Systems  12 point systems were reviewed and negative except as mentioned in the HPI section.    Objective:     Vital Signs (Most Recent):  Temp: 98.9 °F (37.2 °C) (08/20/23 1950)  Pulse: 61 (08/20/23 1933)  Resp: 18 (08/20/23 1933)  BP: 132/88 (08/20/23 1933)  SpO2: 97 % (08/20/23 1933) Vital Signs (24h Range):  Temp:  [98.2 °F (36.8 °C)-98.9 °F (37.2 °C)] 98.9 °F (37.2 °C)  Pulse:  [56-68] 61  Resp:  [16-18] 18  SpO2:  [96 %-99 %] 97 %  BP: (132-186)/() 132/88     Weight: 68 kg (150 lb)  Body mass index is 29.29 kg/m².     Physical Exam   General: Alert ,no apparent cardiorespiratory distress, lying comfortably  Eye: PERRL, normal conjunctiva  HEENT: Normocephalic, atraumatic, dry  oral mucosa  Neck: Supple  Respiratory: Lungs CTA, equal breath sounds, symmetric expansion, no chest wall tenderness  Cardiovascular: Normal rate, regular rhythm, no appreciable murmurs, rubs or gallops, no peripheral edema, good pulses and equal in all extremities.  Gastrointestinal: Soft, non-tender, non-distended, normal bowel sounds  Genitourinary: Deferred  Musculoskeletal and Neurologic:    LUE ?, RUE ?, bilateral LE ?.  Psychiatric: Appropriate mood and affect.           Significant Labs: All pertinent labs within the past 24 hours have been reviewed.  CBC:   Recent Labs   Lab 08/20/23  1738   WBC 5.74   HGB 13.1   HCT 38.1        CMP:   Recent Labs   Lab 08/20/23  1738   *   K 4.8   CL 99   CO2 21*   *    BUN 4*   CREATININE 0.8   CALCIUM 8.8   PROT 7.1   ALBUMIN 3.7   BILITOT 0.6   ALKPHOS 129   *   *   ANIONGAP 10       Significant Imaging: I have reviewed all pertinent imaging results/findings within the past 24 hours.  I have reviewed and interpreted all pertinent imaging results/findings within the past 24 hours.    Assessment/Plan:     * Multiple sclerosis exacerbation  Likely precipitated by acute UTI.  Continue pulse dose steroids 1 g Solu-Medrol for 3 days.  MRI brain ordered, pending at the time of this admission.  Neurology consultation requested.  Fall, aspiration precautions.  Zanaflex on hold      Acute UTI (urinary tract infection)  Continue IV Rocephin  Follow cultures, sensitivities        Hyponatremia  Hypovolemic  Continue gentle IV fluids.    Recent Labs   Lab 08/20/23  1738   *       Transaminitis  New compared to prior.  Check RUQ US and, and acute hepatitis panel.      Essential hypertension  Continue Home dose labetalol and verapamil, and PRN IV hydralazine.      Bipolar 1 disorder  Continue home medications      Chronic pain  On home Suboxone.  Will continue.      Hypomagnesemia  Replace per protocol.  Recent Labs   Lab 08/20/23  1738   MG 1.5*        Hypothyroidism  Continue Synthroid        VTE Risk Mitigation (From admission, onward)         Ordered     enoxaparin injection 40 mg  Daily         08/20/23 1956     IP VTE HIGH RISK PATIENT  Once         08/20/23 1956     Place sequential compression device  Until discontinued         08/20/23 1956                           Mil Martin MD  Department of Hospital Medicine  [facility] [place] [facility]

## 2023-08-21 NOTE — PLAN OF CARE
Problem: Adult Inpatient Plan of Care  Goal: Plan of Care Review  Outcome: Ongoing, Progressing  Goal: Patient-Specific Goal (Individualized)  Outcome: Ongoing, Progressing  Goal: Absence of Hospital-Acquired Illness or Injury  Outcome: Ongoing, Progressing  Intervention: Identify and Manage Fall Risk  Flowsheets (Taken 8/21/2023 0333)  Safety Promotion/Fall Prevention:   assistive device/personal item within reach   bed alarm set   high risk medications identified   medications reviewed   nonskid shoes/socks when out of bed     Problem: Skin Injury Risk Increased  Goal: Skin Health and Integrity  Outcome: Ongoing, Progressing  Intervention: Optimize Skin Protection  Flowsheets (Taken 8/21/2023 0333)  Head of Bed (HOB) Positioning: HOB elevated

## 2023-08-21 NOTE — NURSING
Patient arrived to floor via stretcher. Patient transferred to bed. Patient oriented to floor and room at this time. Basic setup placed at bedside. Vital signs are stable. Admit assessment completed. Patient has no complaints at this time. Instructed to call for any needs. Bed in lowest position. Call bell within reach.

## 2023-08-21 NOTE — NURSING
[facility]  Nurses Note -- 4 Eyes      8/21/2023       Skin assessed on: Q Shift      [x] No Pressure Injuries Present    [x]Prevention Measures Documented    [] Yes LDA  for Pressure Injury Previously documented     [] Yes New Pressure Injury Discovered   [] LDA for New Pressure Injury Added      Attending RN:  Arie Aleman RN     Second RN:  Torey AMAYA RN

## 2023-08-21 NOTE — NURSING
[facility] [place]  Nurses Note -- 4 Eyes      8/20/2023       Skin assessed on: Admit      [x] No Pressure Injuries Present    [x]Prevention Measures Documented    [] Yes LDA  for Pressure Injury Previously documented     [] Yes New Pressure Injury Discovered   [] LDA for New Pressure Injury Added      Attending RN:  Sandy Stanley LPN     Second RN:  Arti Flores RN

## 2023-08-22 PROBLEM — E11.9 TYPE 2 DIABETES MELLITUS, WITHOUT LONG-TERM CURRENT USE OF INSULIN: Status: ACTIVE | Noted: 2023-08-22

## 2023-08-22 LAB
ANION GAP SERPL CALC-SCNC: 11 MMOL/L (ref 8–16)
BACTERIA UR CULT: ABNORMAL
BASOPHILS # BLD AUTO: 0.02 K/UL (ref 0–0.2)
BASOPHILS NFR BLD: 0.2 % (ref 0–1.9)
BUN SERPL-MCNC: 16 MG/DL (ref 6–20)
CALCIUM SERPL-MCNC: 9 MG/DL (ref 8.7–10.5)
CHLORIDE SERPL-SCNC: 106 MMOL/L (ref 95–110)
CO2 SERPL-SCNC: 18 MMOL/L (ref 23–29)
CREAT SERPL-MCNC: 0.8 MG/DL (ref 0.5–1.4)
DIFFERENTIAL METHOD: ABNORMAL
EOSINOPHIL # BLD AUTO: 0 K/UL (ref 0–0.5)
EOSINOPHIL NFR BLD: 0.1 % (ref 0–8)
ERYTHROCYTE [DISTWIDTH] IN BLOOD BY AUTOMATED COUNT: 12.4 % (ref 11.5–14.5)
EST. GFR  (NO RACE VARIABLE): >60 ML/MIN/1.73 M^2
ESTIMATED AVG GLUCOSE: 157 MG/DL (ref 68–131)
GLUCOSE SERPL-MCNC: 350 MG/DL (ref 70–110)
HAV IGM SERPL QL IA: NORMAL
HBA1C MFR BLD: 7.1 % (ref 4–5.6)
HBV CORE IGM SERPL QL IA: NORMAL
HBV SURFACE AG SERPL QL IA: NORMAL
HCT VFR BLD AUTO: 37 % (ref 37–48.5)
HCV AB SERPL QL IA: NORMAL
HGB BLD-MCNC: 12.9 G/DL (ref 12–16)
IMM GRANULOCYTES # BLD AUTO: 0.17 K/UL (ref 0–0.04)
IMM GRANULOCYTES NFR BLD AUTO: 1.3 % (ref 0–0.5)
LYMPHOCYTES # BLD AUTO: 0.6 K/UL (ref 1–4.8)
LYMPHOCYTES NFR BLD: 5 % (ref 18–48)
MAGNESIUM SERPL-MCNC: 1.8 MG/DL (ref 1.6–2.6)
MCH RBC QN AUTO: 30.6 PG (ref 27–31)
MCHC RBC AUTO-ENTMCNC: 34.9 G/DL (ref 32–36)
MCV RBC AUTO: 88 FL (ref 82–98)
MONOCYTES # BLD AUTO: 0.2 K/UL (ref 0.3–1)
MONOCYTES NFR BLD: 1.7 % (ref 4–15)
NEUTROPHILS # BLD AUTO: 11.6 K/UL (ref 1.8–7.7)
NEUTROPHILS NFR BLD: 91.7 % (ref 38–73)
NRBC BLD-RTO: 0 /100 WBC
PHOSPHATE SERPL-MCNC: 3.4 MG/DL (ref 2.7–4.5)
PLATELET # BLD AUTO: 226 K/UL (ref 150–450)
PMV BLD AUTO: 11.9 FL (ref 9.2–12.9)
POCT GLUCOSE: 150 MG/DL (ref 70–110)
POCT GLUCOSE: 227 MG/DL (ref 70–110)
POCT GLUCOSE: 326 MG/DL (ref 70–110)
POCT GLUCOSE: 366 MG/DL (ref 70–110)
POTASSIUM SERPL-SCNC: 4 MMOL/L (ref 3.5–5.1)
RBC # BLD AUTO: 4.22 M/UL (ref 4–5.4)
SODIUM SERPL-SCNC: 135 MMOL/L (ref 136–145)
WBC # BLD AUTO: 12.68 K/UL (ref 3.9–12.7)

## 2023-08-22 PROCEDURE — 85025 COMPLETE CBC W/AUTO DIFF WBC: CPT | Performed by: INTERNAL MEDICINE

## 2023-08-22 PROCEDURE — 25000003 PHARM REV CODE 250: Performed by: STUDENT IN AN ORGANIZED HEALTH CARE EDUCATION/TRAINING PROGRAM

## 2023-08-22 PROCEDURE — 36415 COLL VENOUS BLD VENIPUNCTURE: CPT | Performed by: INTERNAL MEDICINE

## 2023-08-22 PROCEDURE — 11000001 HC ACUTE MED/SURG PRIVATE ROOM

## 2023-08-22 PROCEDURE — 63600175 PHARM REV CODE 636 W HCPCS: Performed by: INTERNAL MEDICINE

## 2023-08-22 PROCEDURE — 83735 ASSAY OF MAGNESIUM: CPT | Performed by: INTERNAL MEDICINE

## 2023-08-22 PROCEDURE — 80048 BASIC METABOLIC PNL TOTAL CA: CPT | Performed by: INTERNAL MEDICINE

## 2023-08-22 PROCEDURE — 84100 ASSAY OF PHOSPHORUS: CPT | Performed by: INTERNAL MEDICINE

## 2023-08-22 PROCEDURE — 25000003 PHARM REV CODE 250: Performed by: INTERNAL MEDICINE

## 2023-08-22 RX ADMIN — MUPIROCIN: 20 OINTMENT TOPICAL at 08:08

## 2023-08-22 RX ADMIN — QUETIAPINE 100 MG: 100 TABLET ORAL at 08:08

## 2023-08-22 RX ADMIN — INSULIN ASPART 4 UNITS: 100 INJECTION, SOLUTION INTRAVENOUS; SUBCUTANEOUS at 05:08

## 2023-08-22 RX ADMIN — ESCITALOPRAM OXALATE 10 MG: 10 TABLET ORAL at 09:08

## 2023-08-22 RX ADMIN — INSULIN ASPART 10 UNITS: 100 INJECTION, SOLUTION INTRAVENOUS; SUBCUTANEOUS at 11:08

## 2023-08-22 RX ADMIN — BUPRENORPHINE AND NALOXONE 1 FILM: 2; .5 FILM BUCCAL; SUBLINGUAL at 09:08

## 2023-08-22 RX ADMIN — LABETALOL HYDROCHLORIDE 200 MG: 100 TABLET, FILM COATED ORAL at 08:08

## 2023-08-22 RX ADMIN — SODIUM CHLORIDE: 9 INJECTION, SOLUTION INTRAVENOUS at 06:08

## 2023-08-22 RX ADMIN — PHENAZOPYRIDINE HYDROCHLORIDE 100 MG: 100 TABLET ORAL at 09:08

## 2023-08-22 RX ADMIN — LABETALOL HYDROCHLORIDE 200 MG: 100 TABLET, FILM COATED ORAL at 09:08

## 2023-08-22 RX ADMIN — LAMOTRIGINE 50 MG: 25 TABLET ORAL at 09:08

## 2023-08-22 RX ADMIN — VERAPAMIL HYDROCHLORIDE 120 MG: 120 TABLET, FILM COATED, EXTENDED RELEASE ORAL at 06:08

## 2023-08-22 RX ADMIN — CEFTRIAXONE 1 G: 1 INJECTION, POWDER, FOR SOLUTION INTRAMUSCULAR; INTRAVENOUS at 04:08

## 2023-08-22 RX ADMIN — INSULIN ASPART 8 UNITS: 100 INJECTION, SOLUTION INTRAVENOUS; SUBCUTANEOUS at 05:08

## 2023-08-22 RX ADMIN — BUPRENORPHINE AND NALOXONE 1 FILM: 2; .5 FILM BUCCAL; SUBLINGUAL at 08:08

## 2023-08-22 RX ADMIN — INSULIN ASPART 8 UNITS: 100 INJECTION, SOLUTION INTRAVENOUS; SUBCUTANEOUS at 09:08

## 2023-08-22 RX ADMIN — PHENAZOPYRIDINE HYDROCHLORIDE 100 MG: 100 TABLET ORAL at 11:08

## 2023-08-22 RX ADMIN — INSULIN ASPART 8 UNITS: 100 INJECTION, SOLUTION INTRAVENOUS; SUBCUTANEOUS at 11:08

## 2023-08-22 RX ADMIN — PHENAZOPYRIDINE HYDROCHLORIDE 100 MG: 100 TABLET ORAL at 04:08

## 2023-08-22 RX ADMIN — ENOXAPARIN SODIUM 40 MG: 40 INJECTION SUBCUTANEOUS at 04:08

## 2023-08-22 RX ADMIN — MUPIROCIN: 20 OINTMENT TOPICAL at 09:08

## 2023-08-22 RX ADMIN — HYDRALAZINE HYDROCHLORIDE 10 MG: 20 INJECTION INTRAMUSCULAR; INTRAVENOUS at 05:08

## 2023-08-22 RX ADMIN — BUPROPION HYDROCHLORIDE 300 MG: 150 TABLET, FILM COATED, EXTENDED RELEASE ORAL at 09:08

## 2023-08-22 RX ADMIN — IBUPROFEN 400 MG: 400 TABLET ORAL at 06:08

## 2023-08-22 RX ADMIN — LEVOTHYROXINE SODIUM 50 MCG: 50 TABLET ORAL at 06:08

## 2023-08-22 NOTE — NURSING
Patient remained free of falls during shift.  Patient AAOX4. RR even and unlabored on room air.  Fall and safety precautions in place. Bed in lowest position, call light and bedside table within reach.Patient verbalizes understanding. BP to be reassessed following antihypertensive.

## 2023-08-22 NOTE — NURSING
[facility], [facility]  Nurses Note -- 4 Eyes      8/22/2023       Skin assessed on: Q Shift      [x] No Pressure Injuries Present    [x]Prevention Measures Documented    [] Yes LDA  for Pressure Injury Previously documented     [] Yes New Pressure Injury Discovered   [] LDA for New Pressure Injury Added      Attending RN:  Elinor Haskins, DANN     Second RN:  Arie Aleman

## 2023-08-22 NOTE — ASSESSMENT & PLAN NOTE
This is a new diagnosis, patient has ever been tested previously.  Glucose uncontrolled at this time given high-dose steroids.  Started on insulin for aggressive control.  Suspect she will discharge on metformin.  Last A1c reviewed-   Lab Results   Component Value Date    HGBA1C 7.1 (H) 08/21/2023     Most recent fingerstick glucose reviewed-   Recent Labs   Lab 08/21/23  1936 08/22/23  0755 08/22/23  1117   POCTGLUCOSE 320* 326* 366*     Current correctional scale  High  Maintain anti-hyperglycemic dose as follows-   Antihyperglycemics (From admission, onward)    Start     Stop Route Frequency Ordered    08/22/23 2100  insulin detemir U-100 (Levemir) pen 28 Units         -- SubQ 2 times daily 08/22/23 1636    08/22/23 0715  insulin aspart U-100 pen 8 Units         -- SubQ 3 times daily with meals 08/21/23 1649    08/21/23 1426  insulin aspart U-100 pen 1-10 Units         -- SubQ Before meals & nightly PRN 08/21/23 1326        Hold Oral hypoglycemics while patient is in the hospital.

## 2023-08-22 NOTE — NURSING
Patient's port accessed by OC. Port is flushing but does not have blood return. Ok to use for medications per . No acute distress. Will continue to monitor.

## 2023-08-22 NOTE — NURSING
Patient remained free of falls during shift.  Patient AAOX4 RR even and unlabored on room air.  Fall and safety precautions in place. Bed in lowest position, call light and bedside table within reach.  Patient verbalizes understanding. Patient c/o Aram frederick notified, awaiting new orders.Will report to night nurse.

## 2023-08-22 NOTE — PROGRESS NOTES
[facility] Medicine  Progress Note    Patient Name: Janis Reese  MRN: [id number]  Patient Class: IP- Inpatient   Admission Date: 8/20/2023  Length of Stay: 2 days  Attending Physician: Fam Reese MD  Primary Care Provider: Rula Betancourt MD        Subjective:     Principal Problem:Multiple sclerosis exacerbation        HPI:  Ms Reese is a   Pleasant 42-year-old  female being admitted for evaluation of MS flare.  Her medical history significant for multiple sclerosis, hypertension, chronic pain, hypothyroidism, bipolar disorder.  She reports having neurology follow-up with Dr. Gordon Sutton in [place] and has been on Kesimpta  for last about 3 to 4 years for her MS.  States that at baseline she is wheelchair-bound, has lower extremity weakness but is able to stand up and get on the wheelchair by herself.  She reports being left-handed.  Reports that over last about 24 hours she has had increasing left upper extremity weakness, markedly decreased strength in lower extremity more so than her baseline and her  has to carry her to put her on the wheelchair, and some right-sided weakness as well.  Also notes that about 4 days she has been having significant dysuria with burning and reddish urine.  Reports that she was treated with antibiotics about 1 month ago for UTI with some improvement of her symptoms but now her symptoms have markedly gotten worse associated with suprapubic pain.  Denies any fevers or chills, chest pain, shortness of breath, nausea vomiting, diarrhea.    In the ER, she has been afebrile her vitals are stable except mildly uncontrolled hypertension.  Her CBC reveals normal white count hemoglobin and platelets.  Her BMP shows mild hyponatremia 130 normal renal function, hypomagnesemia 1.5.  Transaminitis AST: ALT-136: 170 which is a new finding for her compared to previous transaminases from May of this year which were normal.  Lactate is 1.9.   Her UA is consistent with infection.  She has received IV Rocephin and 1 g of IV Solu-Medrol in the ER, he is being admitted for further evaluation and treatment.        Overview/Hospital Course:  No notes on file    Interval History: feeling better today, has never been tested for diabetes    Review of Systems  Objective:     Vital Signs (Most Recent):  Temp: 98.6 °F (37 °C) (08/22/23 1117)  Pulse: 64 (08/22/23 1117)  Resp: 20 (08/22/23 1117)  BP: 117/68 (08/22/23 1117)  SpO2: (!) 94 % (08/22/23 1117) Vital Signs (24h Range):  Temp:  [97.8 °F (36.6 °C)-98.6 °F (37 °C)] 98.6 °F (37 °C)  Pulse:  [56-73] 64  Resp:  [20] 20  SpO2:  [93 %-96 %] 94 %  BP: (115-198)/(68-93) 117/68     Weight: 68 kg (150 lb)  Body mass index is 29.29 kg/m².    Intake/Output Summary (Last 24 hours) at 8/22/2023 1636  Last data filed at 8/22/2023 0800  Gross per 24 hour   Intake 240 ml   Output 500 ml   Net -260 ml           Physical Exam  Vitals and nursing note reviewed.   Constitutional:       Appearance: Normal appearance.   Cardiovascular:      Rate and Rhythm: Normal rate and regular rhythm.      Pulses: Normal pulses.   Abdominal:      General: Bowel sounds are normal. There is no distension.   Musculoskeletal:         General: No swelling.   Skin:     General: Skin is warm.   Neurological:      Mental Status: She is alert.      Comments: Left lower extremity weakness, mild upper left extremity weakness             Significant Labs: All pertinent labs within the past 24 hours have been reviewed.    Significant Imaging: I have reviewed all pertinent imaging results/findings within the past 24 hours.      Assessment/Plan:      * Multiple sclerosis exacerbation  Likely precipitated by acute UTI.  Continue pulse dose steroids 1 g Solu-Medrol for 3 days.  MRI brain ordered, pending at the time of this admission.  Neurology consultation requested.  Fall, aspiration precautions.  Zanaflex on hold      Type 2 diabetes mellitus, without  long-term current use of insulin  This is a new diagnosis, patient has ever been tested previously.  Glucose uncontrolled at this time given high-dose steroids.  Started on insulin for aggressive control.  Suspect she will discharge on metformin.  Last A1c reviewed-   Lab Results   Component Value Date    HGBA1C 7.1 (H) 08/21/2023     Most recent fingerstick glucose reviewed-   Recent Labs   Lab 08/21/23  1936 08/22/23  0755 08/22/23  1117   POCTGLUCOSE 320* 326* 366*     Current correctional scale  High  Maintain anti-hyperglycemic dose as follows-   Antihyperglycemics (From admission, onward)    Start     Stop Route Frequency Ordered    08/22/23 2100  insulin detemir U-100 (Levemir) pen 28 Units         -- SubQ 2 times daily 08/22/23 1636    08/22/23 0715  insulin aspart U-100 pen 8 Units         -- SubQ 3 times daily with meals 08/21/23 1649    08/21/23 1426  insulin aspart U-100 pen 1-10 Units         -- SubQ Before meals & nightly PRN 08/21/23 1326        Hold Oral hypoglycemics while patient is in the hospital.    Hypothyroidism  Continue Synthroid      Acute UTI (urinary tract infection)  Continue IV Rocephin  Follow cultures, sensitivities - pansensitive to E coli        Hypomagnesemia  Replace per protocol.  Recent Labs   Lab 08/20/23  1738   MG 1.5*        Transaminitis  New compared to prior.  Check RUQ US and, and acute hepatitis panel.      Hyponatremia  Hypovolemic  Continue gentle IV fluids.    Recent Labs   Lab 08/20/23  1738   *       Chronic pain  On home Suboxone.  Will continue.   queried.      Bipolar 1 disorder  Continue home medications      Essential hypertension  Continue Home dose labetalol and verapamil, and PRN IV hydralazine.        VTE Risk Mitigation (From admission, onward)         Ordered     enoxaparin injection 40 mg  Daily         08/20/23 1956     IP VTE HIGH RISK PATIENT  Once         08/20/23 1956     Place sequential compression device  Until discontinued          08/20/23 1956                Discharge Planning   ROSA:      Code Status: Full Code   Is the patient medically ready for discharge?:     Reason for patient still in hospital (select all that apply): Treatment  Discharge Plan A: Home with family                  Fam Reese MD  Department of Hospital Medicine   [facility] Surg

## 2023-08-22 NOTE — PLAN OF CARE
Plan of care is ongoing.    Problem: Adult Inpatient Plan of Care  Goal: Plan of Care Review  Outcome: Ongoing, Progressing  Goal: Patient-Specific Goal (Individualized)  Outcome: Ongoing, Progressing  Goal: Absence of Hospital-Acquired Illness or Injury  Outcome: Ongoing, Progressing  Goal: Optimal Comfort and Wellbeing  Outcome: Ongoing, Progressing  Goal: Readiness for Transition of Care  Outcome: Ongoing, Progressing     Problem: Infection  Goal: Absence of Infection Signs and Symptoms  Outcome: Ongoing, Progressing

## 2023-08-22 NOTE — PLAN OF CARE
Plan of care is ongoing.    Problem: Adult Inpatient Plan of Care  Goal: Plan of Care Review  Outcome: Ongoing, Progressing  Goal: Patient-Specific Goal (Individualized)  Outcome: Ongoing, Progressing  Goal: Absence of Hospital-Acquired Illness or Injury  Outcome: Ongoing, Progressing  Goal: Optimal Comfort and Wellbeing  Outcome: Ongoing, Progressing  Goal: Readiness for Transition of Care  Outcome: Ongoing, Progressing     Problem: Infection  Goal: Absence of Infection Signs and Symptoms  Outcome: Ongoing, Progressing     Problem: Skin Injury Risk Increased  Goal: Skin Health and Integrity  Outcome: Ongoing, Progressing

## 2023-08-22 NOTE — SUBJECTIVE & OBJECTIVE
Interval History: feeling better today, has never been tested for diabetes    Review of Systems  Objective:     Vital Signs (Most Recent):  Temp: 98.6 °F (37 °C) (08/22/23 1117)  Pulse: 64 (08/22/23 1117)  Resp: 20 (08/22/23 1117)  BP: 117/68 (08/22/23 1117)  SpO2: (!) 94 % (08/22/23 1117) Vital Signs (24h Range):  Temp:  [97.8 °F (36.6 °C)-98.6 °F (37 °C)] 98.6 °F (37 °C)  Pulse:  [56-73] 64  Resp:  [20] 20  SpO2:  [93 %-96 %] 94 %  BP: (115-198)/(68-93) 117/68     Weight: 68 kg (150 lb)  Body mass index is 29.29 kg/m².    Intake/Output Summary (Last 24 hours) at 8/22/2023 1636  Last data filed at 8/22/2023 0800  Gross per 24 hour   Intake 240 ml   Output 500 ml   Net -260 ml           Physical Exam  Vitals and nursing note reviewed.   Constitutional:       Appearance: Normal appearance.   Cardiovascular:      Rate and Rhythm: Normal rate and regular rhythm.      Pulses: Normal pulses.   Abdominal:      General: Bowel sounds are normal. There is no distension.   Musculoskeletal:         General: No swelling.   Skin:     General: Skin is warm.   Neurological:      Mental Status: She is alert.      Comments: Left lower extremity weakness, mild upper left extremity weakness             Significant Labs: All pertinent labs within the past 24 hours have been reviewed.    Significant Imaging: I have reviewed all pertinent imaging results/findings within the past 24 hours.

## 2023-08-23 ENCOUNTER — TELEPHONE (OUTPATIENT)
Dept: PRIMARY CARE CLINIC | Facility: CLINIC | Age: 43
End: 2023-08-23
Payer: MEDICAID

## 2023-08-23 VITALS
HEART RATE: 62 BPM | SYSTOLIC BLOOD PRESSURE: 148 MMHG | OXYGEN SATURATION: 97 % | TEMPERATURE: 97 F | BODY MASS INDEX: 29.45 KG/M2 | WEIGHT: 150 LBS | RESPIRATION RATE: 18 BRPM | HEIGHT: 60 IN | DIASTOLIC BLOOD PRESSURE: 80 MMHG

## 2023-08-23 LAB
ANION GAP SERPL CALC-SCNC: 9 MMOL/L (ref 8–16)
BASOPHILS # BLD AUTO: 0.04 K/UL (ref 0–0.2)
BASOPHILS NFR BLD: 0.3 % (ref 0–1.9)
BUN SERPL-MCNC: 19 MG/DL (ref 6–20)
CALCIUM SERPL-MCNC: 8.8 MG/DL (ref 8.7–10.5)
CHLORIDE SERPL-SCNC: 114 MMOL/L (ref 95–110)
CO2 SERPL-SCNC: 17 MMOL/L (ref 23–29)
CREAT SERPL-MCNC: 0.8 MG/DL (ref 0.5–1.4)
DIFFERENTIAL METHOD: ABNORMAL
EOSINOPHIL # BLD AUTO: 0 K/UL (ref 0–0.5)
EOSINOPHIL NFR BLD: 0.1 % (ref 0–8)
ERYTHROCYTE [DISTWIDTH] IN BLOOD BY AUTOMATED COUNT: 13.3 % (ref 11.5–14.5)
EST. GFR  (NO RACE VARIABLE): >60 ML/MIN/1.73 M^2
GLUCOSE SERPL-MCNC: 116 MG/DL (ref 70–110)
HCT VFR BLD AUTO: 37.7 % (ref 37–48.5)
HGB BLD-MCNC: 12.8 G/DL (ref 12–16)
IMM GRANULOCYTES # BLD AUTO: 0.16 K/UL (ref 0–0.04)
IMM GRANULOCYTES NFR BLD AUTO: 1 % (ref 0–0.5)
LYMPHOCYTES # BLD AUTO: 1.8 K/UL (ref 1–4.8)
LYMPHOCYTES NFR BLD: 11.1 % (ref 18–48)
MAGNESIUM SERPL-MCNC: 1.9 MG/DL (ref 1.6–2.6)
MCH RBC QN AUTO: 30.3 PG (ref 27–31)
MCHC RBC AUTO-ENTMCNC: 34 G/DL (ref 32–36)
MCV RBC AUTO: 89 FL (ref 82–98)
MONOCYTES # BLD AUTO: 1 K/UL (ref 0.3–1)
MONOCYTES NFR BLD: 6.5 % (ref 4–15)
NEUTROPHILS # BLD AUTO: 12.8 K/UL (ref 1.8–7.7)
NEUTROPHILS NFR BLD: 81 % (ref 38–73)
NRBC BLD-RTO: 0 /100 WBC
PHOSPHATE SERPL-MCNC: 3 MG/DL (ref 2.7–4.5)
PLATELET # BLD AUTO: 243 K/UL (ref 150–450)
PMV BLD AUTO: 12.3 FL (ref 9.2–12.9)
POCT GLUCOSE: 123 MG/DL (ref 70–110)
POCT GLUCOSE: 128 MG/DL (ref 70–110)
POCT GLUCOSE: 59 MG/DL (ref 70–110)
POTASSIUM SERPL-SCNC: 3.7 MMOL/L (ref 3.5–5.1)
RBC # BLD AUTO: 4.22 M/UL (ref 4–5.4)
SODIUM SERPL-SCNC: 140 MMOL/L (ref 136–145)
WBC # BLD AUTO: 15.79 K/UL (ref 3.9–12.7)

## 2023-08-23 PROCEDURE — 63600175 PHARM REV CODE 636 W HCPCS: Performed by: STUDENT IN AN ORGANIZED HEALTH CARE EDUCATION/TRAINING PROGRAM

## 2023-08-23 PROCEDURE — 83735 ASSAY OF MAGNESIUM: CPT | Performed by: INTERNAL MEDICINE

## 2023-08-23 PROCEDURE — 25000003 PHARM REV CODE 250: Performed by: STUDENT IN AN ORGANIZED HEALTH CARE EDUCATION/TRAINING PROGRAM

## 2023-08-23 PROCEDURE — 80048 BASIC METABOLIC PNL TOTAL CA: CPT | Performed by: INTERNAL MEDICINE

## 2023-08-23 PROCEDURE — 63600175 PHARM REV CODE 636 W HCPCS: Performed by: INTERNAL MEDICINE

## 2023-08-23 PROCEDURE — 84100 ASSAY OF PHOSPHORUS: CPT | Performed by: INTERNAL MEDICINE

## 2023-08-23 PROCEDURE — 25000003 PHARM REV CODE 250: Performed by: INTERNAL MEDICINE

## 2023-08-23 PROCEDURE — 85025 COMPLETE CBC W/AUTO DIFF WBC: CPT | Performed by: INTERNAL MEDICINE

## 2023-08-23 PROCEDURE — 36415 COLL VENOUS BLD VENIPUNCTURE: CPT | Performed by: INTERNAL MEDICINE

## 2023-08-23 RX ORDER — METFORMIN HYDROCHLORIDE 500 MG/1
500 TABLET ORAL 2 TIMES DAILY WITH MEALS
Qty: 180 TABLET | Refills: 3 | Status: SHIPPED | OUTPATIENT
Start: 2023-08-23 | End: 2024-01-18

## 2023-08-23 RX ORDER — HEPARIN 100 UNIT/ML
5 SYRINGE INTRAVENOUS ONCE
Status: COMPLETED | OUTPATIENT
Start: 2023-08-23 | End: 2023-08-23

## 2023-08-23 RX ORDER — CEPHALEXIN 500 MG/1
500 CAPSULE ORAL EVERY 6 HOURS
Qty: 20 CAPSULE | Refills: 0 | Status: SHIPPED | OUTPATIENT
Start: 2023-08-23 | End: 2023-08-28

## 2023-08-23 RX ADMIN — IBUPROFEN 400 MG: 400 TABLET ORAL at 07:08

## 2023-08-23 RX ADMIN — ESCITALOPRAM OXALATE 10 MG: 10 TABLET ORAL at 09:08

## 2023-08-23 RX ADMIN — METHYLPREDNISOLONE SODIUM SUCCINATE 1000 MG: 1 INJECTION, POWDER, LYOPHILIZED, FOR SOLUTION INTRAMUSCULAR; INTRAVENOUS at 10:08

## 2023-08-23 RX ADMIN — HEPARIN 500 UNITS: 100 SYRINGE at 01:08

## 2023-08-23 RX ADMIN — LABETALOL HYDROCHLORIDE 200 MG: 100 TABLET, FILM COATED ORAL at 09:08

## 2023-08-23 RX ADMIN — Medication 16 G: at 10:08

## 2023-08-23 RX ADMIN — MUPIROCIN: 20 OINTMENT TOPICAL at 09:08

## 2023-08-23 RX ADMIN — INSULIN ASPART 8 UNITS: 100 INJECTION, SOLUTION INTRAVENOUS; SUBCUTANEOUS at 07:08

## 2023-08-23 RX ADMIN — BUPRENORPHINE AND NALOXONE 1 FILM: 2; .5 FILM BUCCAL; SUBLINGUAL at 09:08

## 2023-08-23 RX ADMIN — PHENAZOPYRIDINE HYDROCHLORIDE 100 MG: 100 TABLET ORAL at 07:08

## 2023-08-23 RX ADMIN — LAMOTRIGINE 50 MG: 25 TABLET ORAL at 09:08

## 2023-08-23 RX ADMIN — BUPROPION HYDROCHLORIDE 300 MG: 150 TABLET, FILM COATED, EXTENDED RELEASE ORAL at 09:08

## 2023-08-23 RX ADMIN — LEVOTHYROXINE SODIUM 50 MCG: 50 TABLET ORAL at 06:08

## 2023-08-23 RX ADMIN — PHENAZOPYRIDINE HYDROCHLORIDE 100 MG: 100 TABLET ORAL at 11:08

## 2023-08-23 RX ADMIN — VERAPAMIL HYDROCHLORIDE 120 MG: 120 TABLET, FILM COATED, EXTENDED RELEASE ORAL at 06:08

## 2023-08-23 NOTE — NURSING
[facility] [place]  Nurses Note -- 4 Eyes      8/23/2023       Skin assessed on: Q Shift      [x] No Pressure Injuries Present    [x]Prevention Measures Documented    [] Yes LDA  for Pressure Injury Previously documented     [] Yes New Pressure Injury Discovered   [] LDA for New Pressure Injury Added      Attending RN:  Elinor Haskins RN     Second RN:  Arie Aleman RN

## 2023-08-23 NOTE — NURSING
[facility], [facility] [place]  Nurses Note -- 4 Eyes      8/23/2023       Skin assessed on: Q Shift      [x] No Pressure Injuries Present    []Prevention Measures Documented    [] Yes LDA  for Pressure Injury Previously documented     [] Yes New Pressure Injury Discovered   [] LDA for New Pressure Injury Added      Attending RN:  Marilia Amador, RN     Second RN:  Elinor Haskins

## 2023-08-23 NOTE — NURSING
Port a cath de-accessed and heparin locked. Dressed with gauze and tegaderm. Patient tolerated well. Patient  at the bedside. Patient awaiting patient escort at this time.

## 2023-08-23 NOTE — PLAN OF CARE
[facility] - Med Surg  Discharge Final Note    Patient clear to discharge from case management stand point. In basket message sent for pcp to contact patient to schedule follow up, listed on avs. Pt;s spouse to provide transportation home.     Primary Care Provider: Rula Betancourt MD    Expected Discharge Date: 8/23/2023    Final Discharge Note (most recent)       Final Note - 08/23/23 1105          Final Note    Assessment Type Final Discharge Note     Anticipated Discharge Disposition Home or Self Care     Hospital Resources/Appts/Education Provided Provided patient/caregiver with written discharge plan information (P)         Post-Acute Status    Discharge Delays None known at this time (P)                      Important Message from Medicare             Contact Info       Rula Betancourt MD   Specialty: Internal Medicine   Relationship: PCP - General    [address]  [facility]  [address]  [facility] [address]   Phone: [phone]       Next Steps: Schedule an appointment as soon as possible for a visit in 1 week(s)    Instructions: In basket message sent for clinic to contact patient to schedule.

## 2023-08-23 NOTE — PLAN OF CARE
Problem: Adult Inpatient Plan of Care  Goal: Plan of Care Review  Outcome: Ongoing, Progressing  Goal: Patient-Specific Goal (Individualized)  Outcome: Ongoing, Progressing  Goal: Absence of Hospital-Acquired Illness or Injury  Outcome: Ongoing, Progressing  Goal: Optimal Comfort and Wellbeing  Outcome: Ongoing, Progressing  Goal: Readiness for Transition of Care  Outcome: Ongoing, Progressing     Problem: Infection  Goal: Absence of Infection Signs and Symptoms  Outcome: Ongoing, Progressing     Problem: Skin Injury Risk Increased  Goal: Skin Health and Integrity  Outcome: Ongoing, Progressing     Problem: Diabetes Comorbidity  Goal: Blood Glucose Level Within Targeted Range  Outcome: Ongoing, Progressing

## 2023-08-23 NOTE — NURSING
Discharge instructions, follow up appts., and prescriptions given and explained to patient. Patient verbalizes understanding of all.

## 2023-08-23 NOTE — TELEPHONE ENCOUNTER
----- Message from Cassie Mason sent at 8/23/2023 10:44 AM CDT -----  Regarding: FW: Hospital follow up    ----- Message -----  From: Flower Lind  Sent: 8/23/2023  10:30 AM CDT  To: Cassie Mason  Subject: FW: Hospital follow up                           Okay to use a NP slot for hosp F/U if needed    ----- Message -----  From: Bibiana Jenkins RN  Sent: 8/23/2023  10:13 AM CDT  To: [facility] H[facility] Staff  Subject: Hospital follow up                               Please contact patient to schedule follow up appointment. Patient discharge today. Thank you

## 2023-08-24 ENCOUNTER — PATIENT OUTREACH (OUTPATIENT)
Dept: ADMINISTRATIVE | Facility: CLINIC | Age: 43
End: 2023-08-24
Payer: MEDICAID

## 2023-08-24 LAB
BACTERIA BLD CULT: NORMAL
BACTERIA BLD CULT: NORMAL

## 2023-08-24 NOTE — PROGRESS NOTES
C3 nurse spoke with Janis Reese  for a TCC post hospital discharge follow up call. The patient has a scheduled [facility] appointment with Rula Betancourt MD  on 8/31/23 @ 0900.

## 2023-08-24 NOTE — PROGRESS NOTES
C3 nurse attempted to contact Janis Reese  for a TCC post hospital discharge follow up call. No answer. Left voicemail with callback information. The patient does not have a scheduled HOSFU appointment. Message sent to PCP staff for assistance with scheduling visit with patient.

## 2023-08-24 NOTE — DISCHARGE SUMMARY
[facility] Medicine  Discharge Summary      Patient Name: Janis Reese  MRN: [id number]  [facility]: [id number]  Patient Class: IP- Inpatient  Admission Date: 8/20/2023  Hospital Length of Stay: 3 days  Discharge Date and Time: 8/23/2023  2:21 PM  Attending Physician: No att. providers found   Discharging Provider: Fam Reese MD  Primary Care Provider: Rula Betancourt MD    Primary Care Team: Networked reference to record PCT     HPI:   Ms Reese is a   Pleasant 42-year-old  female being admitted for evaluation of MS flare.  Her medical history significant for multiple sclerosis, hypertension, chronic pain, hypothyroidism, bipolar disorder.  She reports having neurology follow-up with Dr. Gordon Sutton in [place] and has been on Kesimpta  for last about 3 to 4 years for her MS.  States that at baseline she is wheelchair-bound, has lower extremity weakness but is able to stand up and get on the wheelchair by herself.  She reports being left-handed.  Reports that over last about 24 hours she has had increasing left upper extremity weakness, markedly decreased strength in lower extremity more so than her baseline and her  has to carry her to put her on the wheelchair, and some right-sided weakness as well.  Also notes that about 4 days she has been having significant dysuria with burning and reddish urine.  Reports that she was treated with antibiotics about 1 month ago for UTI with some improvement of her symptoms but now her symptoms have markedly gotten worse associated with suprapubic pain.  Denies any fevers or chills, chest pain, shortness of breath, nausea vomiting, diarrhea.    In the ER, she has been afebrile her vitals are stable except mildly uncontrolled hypertension.  Her CBC reveals normal white count hemoglobin and platelets.  Her BMP shows mild hyponatremia 130 normal renal function, hypomagnesemia 1.5.  Transaminitis AST: ALT-136: 170 which is a new finding  for her compared to previous transaminases from May of this year which were normal.  Lactate is 1.9.  Her UA is consistent with infection.  She has received IV Rocephin and 1 g of IV Solu-Medrol in the ER, he is being admitted for further evaluation and treatment.        * No surgery found *      Hospital Course:   Mr. Reese is a 43-year-old female who was admitted with urinary tract infection and possible MS flare.  Neurology consulted and started on 1 g Solu-Medrol for 3 doses.  Started on ceftriaxone.  Of note, patient's glucose very elevated during admission.  A1c checked and noted to be 7.1.  This is a new diagnosis for patient.  MS flare improved and patient overall feeling much better.  Urine cultures with E coli sensitive to cephalexin.  Prescription given to patient.  Patient was discharged home with Keflex and metformin.  She will need to follow up with her primary care physician for ongoing diabetes management.  All questions answered, patient discharged home in stable condition.       Goals of Care Treatment Preferences:  Code Status: Full Code      Consults:   Consults (From admission, onward)        Status Ordering Provider     Inpatient consult to [facility] Neurology  Once        Provider:  (Not yet assigned)    Completed ALEX PAULSON          No new Assessment & Plan notes have been filed under this hospital service since the last note was generated.  Service: Hospital Medicine    Final Active Diagnoses:    Diagnosis Date Noted POA    PRINCIPAL PROBLEM:  Multiple sclerosis exacerbation [G35] 08/20/2023 Yes    Type 2 diabetes mellitus, without long-term current use of insulin [E11.9] 08/22/2023 Yes    Hyponatremia [E87.1] 08/20/2023 Yes    Transaminitis [R74.01] 08/20/2023 Yes    Hypomagnesemia [E83.42] 08/20/2023 Yes    Acute UTI (urinary tract infection) [N39.0] 08/20/2023 Yes    Hypothyroidism [E03.9] 08/20/2023 Yes    Chronic pain [G89.29] 05/15/2023 Yes    Essential  hypertension [I10] 08/03/2020 Yes    Bipolar 1 disorder [F31.9] 08/03/2020 Yes      Problems Resolved During this Admission:       Discharged Condition: stable    Disposition: Home or Self Care    Follow Up:   Follow-up Information     Rula Betancourt MD. Schedule an appointment as soon as possible for a visit in 1 week(s).    Specialty: Internal Medicine  Why: In basket message sent for clinic to contact patient to schedule.  Contact information:  [address]  [address]  [address]  [place] [address] [phone]                       Patient Instructions:      Diet diabetic     Notify your health care provider if you experience any of the following:  temperature >100.4     Notify your health care provider if you experience any of the following:  persistent nausea and vomiting or diarrhea     Notify your health care provider if you experience any of the following:  severe uncontrolled pain     Notify your health care provider if you experience any of the following:  difficulty breathing or increased cough     Notify your health care provider if you experience any of the following:  severe persistent headache     Notify your health care provider if you experience any of the following:  worsening rash     Notify your health care provider if you experience any of the following:  persistent dizziness, light-headedness, or visual disturbances     Notify your health care provider if you experience any of the following:  increased confusion or weakness     Activity as tolerated       Significant Diagnostic Studies: Labs: All labs within the past 24 hours have been reviewed    Pending Diagnostic Studies:     None         Medications:  Reconciled Home Medications:      Medication List      START taking these medications    cephALEXin 500 MG capsule  Commonly known as: KEFLEX  Take 1 capsule (500 mg total) by mouth every 6 (six) hours. for 5 days     metFORMIN 500 MG tablet  Commonly known as: GLUCOPHAGE  Take 1  tablet (500 mg total) by mouth 2 (two) times daily with meals.        CONTINUE taking these medications    buprenorphine-naloxone 8-2 mg 8-2 mg  Commonly known as: SUBOXONE  Place 1 each under the tongue 2 (two) times a day.     buPROPion 300 MG 24 hr tablet  Commonly known as: WELLBUTRIN XL  Take 300 mg by mouth once daily.     cloNIDine 0.1 MG tablet  Commonly known as: CATAPRES  Take 0.1 mg by mouth 3 (three) times daily as needed (withdrawal, anxiety, insomnia).     EScitalopram oxalate 10 MG tablet  Commonly known as: LEXAPRO  Take 10 mg by mouth once daily.     KESIMPTA PEN 20 mg/0.4 mL Pnij  Generic drug: ofatumumab  SMARTSI Milligram(s) SUB-Q Once a Month     lamoTRIgine 100 MG tablet  Commonly known as: LAMICTAL  Take 100 mg by mouth every morning.     levothyroxine 50 MCG tablet  Commonly known as: SYNTHROID  TAKE 1 TABLET BY MOUTH BEFORE BREAKFAST.     QUEtiapine 100 MG Tab  Commonly known as: SEROQUEL  Take 1 tablet (100 mg total) by mouth every evening.     verapamiL 120 MG CR tablet  Commonly known as: CALAN-SR  TAKE 1 TABLET BY MOUTH EVERY MORNING BEFORE BREAKFAST        STOP taking these medications    tiZANidine 4 MG tablet  Commonly known as: ZANAFLEX            Indwelling Lines/Drains at time of discharge:   Lines/Drains/Airways     Central Venous Catheter Line  Duration           Port A Cath Single Lumen 02/10/20 Subclavian Right 1291 days          Drain  Duration           Female External Urinary Catheter 23 2255 3 days                Time spent on the discharge of patient: >35 minutes         Fam Reese MD  Department of Hospital Medicine  [facility] Surg

## 2023-08-24 NOTE — HOSPITAL COURSE
Mr. Reese is a 43-year-old female who was admitted with urinary tract infection and possible MS flare.  Neurology consulted and started on 1 g Solu-Medrol for 3 doses.  Started on ceftriaxone.  Of note, patient's glucose very elevated during admission.  A1c checked and noted to be 7.1.  This is a new diagnosis for patient.  MS flare improved and patient overall feeling much better.  Urine cultures with E coli sensitive to cephalexin.  Prescription given to patient.  Patient was discharged home with Keflex and metformin.  She will need to follow up with her primary care physician for ongoing diabetes management.  All questions answered, patient discharged home in stable condition.

## 2023-10-16 ENCOUNTER — HOSPITAL ENCOUNTER (EMERGENCY)
Facility: HOSPITAL | Age: 43
Discharge: HOME | End: 2023-10-16
Attending: EMERGENCY MEDICINE
Payer: MEDICAID

## 2023-10-16 VITALS
OXYGEN SATURATION: 97 % | HEIGHT: 60 IN | TEMPERATURE: 98 F | HEART RATE: 55 BPM | DIASTOLIC BLOOD PRESSURE: 80 MMHG | WEIGHT: 150 LBS | RESPIRATION RATE: 18 BRPM | BODY MASS INDEX: 29.45 KG/M2 | SYSTOLIC BLOOD PRESSURE: 152 MMHG

## 2023-10-16 DIAGNOSIS — R53.83 FATIGUE: ICD-10-CM

## 2023-10-16 DIAGNOSIS — R06.02 SOB (SHORTNESS OF BREATH): ICD-10-CM

## 2023-10-16 DIAGNOSIS — N30.00 ACUTE CYSTITIS WITHOUT HEMATURIA: Primary | ICD-10-CM

## 2023-10-16 LAB
ALBUMIN SERPL BCP-MCNC: 3.4 G/DL (ref 3.5–5.2)
ALP SERPL-CCNC: 145 U/L (ref 55–135)
ALT SERPL W/O P-5'-P-CCNC: 79 U/L (ref 10–44)
ANION GAP SERPL CALC-SCNC: 9 MMOL/L (ref 8–16)
AST SERPL-CCNC: 62 U/L (ref 10–40)
B-HCG UR QL: NEGATIVE
BACTERIA #/AREA URNS HPF: ABNORMAL /HPF
BASOPHILS # BLD AUTO: 0.04 K/UL (ref 0–0.2)
BASOPHILS NFR BLD: 0.5 % (ref 0–1.9)
BILIRUB SERPL-MCNC: 0.6 MG/DL (ref 0.1–1)
BILIRUB UR QL STRIP: NEGATIVE
BUN SERPL-MCNC: 11 MG/DL (ref 6–20)
CALCIUM SERPL-MCNC: 9.1 MG/DL (ref 8.7–10.5)
CHLORIDE SERPL-SCNC: 98 MMOL/L (ref 95–110)
CLARITY UR: ABNORMAL
CO2 SERPL-SCNC: 27 MMOL/L (ref 23–29)
COLOR UR: YELLOW
CREAT SERPL-MCNC: 0.9 MG/DL (ref 0.5–1.4)
CTP QC/QA: YES
DIFFERENTIAL METHOD: ABNORMAL
EOSINOPHIL # BLD AUTO: 0.2 K/UL (ref 0–0.5)
EOSINOPHIL NFR BLD: 2.3 % (ref 0–8)
ERYTHROCYTE [DISTWIDTH] IN BLOOD BY AUTOMATED COUNT: 12 % (ref 11.5–14.5)
EST. GFR  (NO RACE VARIABLE): >60 ML/MIN/1.73 M^2
GLUCOSE SERPL-MCNC: 297 MG/DL (ref 70–110)
GLUCOSE UR QL STRIP: NEGATIVE
HCT VFR BLD AUTO: 37.2 % (ref 37–48.5)
HGB BLD-MCNC: 12.8 G/DL (ref 12–16)
HGB UR QL STRIP: NEGATIVE
HYALINE CASTS #/AREA URNS LPF: 3 /LPF
IMM GRANULOCYTES # BLD AUTO: 0.03 K/UL (ref 0–0.04)
IMM GRANULOCYTES NFR BLD AUTO: 0.4 % (ref 0–0.5)
KETONES UR QL STRIP: NEGATIVE
LACTATE SERPL-SCNC: 1.5 MMOL/L (ref 0.5–2.2)
LEUKOCYTE ESTERASE UR QL STRIP: ABNORMAL
LYMPHOCYTES # BLD AUTO: 1.1 K/UL (ref 1–4.8)
LYMPHOCYTES NFR BLD: 14.9 % (ref 18–48)
MCH RBC QN AUTO: 31.1 PG (ref 27–31)
MCHC RBC AUTO-ENTMCNC: 34.4 G/DL (ref 32–36)
MCV RBC AUTO: 90 FL (ref 82–98)
MICROSCOPIC COMMENT: ABNORMAL
MONOCYTES # BLD AUTO: 0.5 K/UL (ref 0.3–1)
MONOCYTES NFR BLD: 6.7 % (ref 4–15)
NEUTROPHILS # BLD AUTO: 5.5 K/UL (ref 1.8–7.7)
NEUTROPHILS NFR BLD: 75.2 % (ref 38–73)
NITRITE UR QL STRIP: POSITIVE
NRBC BLD-RTO: 0 /100 WBC
PH UR STRIP: 7 [PH] (ref 5–8)
PLATELET # BLD AUTO: 273 K/UL (ref 150–450)
PMV BLD AUTO: 10.9 FL (ref 9.2–12.9)
POTASSIUM SERPL-SCNC: 4.1 MMOL/L (ref 3.5–5.1)
PROT SERPL-MCNC: 6.2 G/DL (ref 6–8.4)
PROT UR QL STRIP: ABNORMAL
RBC # BLD AUTO: 4.12 M/UL (ref 4–5.4)
RBC #/AREA URNS HPF: 18 /HPF (ref 0–4)
SODIUM SERPL-SCNC: 134 MMOL/L (ref 136–145)
SP GR UR STRIP: 1.01 (ref 1–1.03)
SQUAMOUS #/AREA URNS HPF: 10 /HPF
T4 FREE SERPL-MCNC: 0.88 NG/DL (ref 0.71–1.51)
TSH SERPL DL<=0.005 MIU/L-ACNC: 4.14 UIU/ML (ref 0.4–4)
URN SPEC COLLECT METH UR: ABNORMAL
UROBILINOGEN UR STRIP-ACNC: ABNORMAL EU/DL
WBC # BLD AUTO: 7.34 K/UL (ref 3.9–12.7)
WBC #/AREA URNS HPF: >100 /HPF (ref 0–5)
WBC CLUMPS URNS QL MICRO: ABNORMAL

## 2023-10-16 PROCEDURE — 81025 URINE PREGNANCY TEST: CPT | Performed by: EMERGENCY MEDICINE

## 2023-10-16 PROCEDURE — 87040 BLOOD CULTURE FOR BACTERIA: CPT | Mod: 59 | Performed by: EMERGENCY MEDICINE

## 2023-10-16 PROCEDURE — 80053 COMPREHEN METABOLIC PANEL: CPT | Performed by: EMERGENCY MEDICINE

## 2023-10-16 PROCEDURE — 99285 EMERGENCY DEPT VISIT HI MDM: CPT | Mod: 25

## 2023-10-16 PROCEDURE — 83605 ASSAY OF LACTIC ACID: CPT | Performed by: EMERGENCY MEDICINE

## 2023-10-16 PROCEDURE — 84439 ASSAY OF FREE THYROXINE: CPT | Performed by: EMERGENCY MEDICINE

## 2023-10-16 PROCEDURE — 87077 CULTURE AEROBIC IDENTIFY: CPT | Performed by: EMERGENCY MEDICINE

## 2023-10-16 PROCEDURE — 93010 EKG 12-LEAD: ICD-10-PCS | Mod: ,,, | Performed by: INTERNAL MEDICINE

## 2023-10-16 PROCEDURE — 96365 THER/PROPH/DIAG IV INF INIT: CPT | Mod: 59

## 2023-10-16 PROCEDURE — 96375 TX/PRO/DX INJ NEW DRUG ADDON: CPT

## 2023-10-16 PROCEDURE — 87088 URINE BACTERIA CULTURE: CPT | Performed by: EMERGENCY MEDICINE

## 2023-10-16 PROCEDURE — 84443 ASSAY THYROID STIM HORMONE: CPT | Performed by: EMERGENCY MEDICINE

## 2023-10-16 PROCEDURE — 81000 URINALYSIS NONAUTO W/SCOPE: CPT | Performed by: EMERGENCY MEDICINE

## 2023-10-16 PROCEDURE — 85025 COMPLETE CBC W/AUTO DIFF WBC: CPT | Performed by: EMERGENCY MEDICINE

## 2023-10-16 PROCEDURE — 87186 SC STD MICRODIL/AGAR DIL: CPT | Performed by: EMERGENCY MEDICINE

## 2023-10-16 PROCEDURE — 25000003 PHARM REV CODE 250: Performed by: EMERGENCY MEDICINE

## 2023-10-16 PROCEDURE — 63600175 PHARM REV CODE 636 W HCPCS: Performed by: EMERGENCY MEDICINE

## 2023-10-16 PROCEDURE — 87086 URINE CULTURE/COLONY COUNT: CPT | Performed by: EMERGENCY MEDICINE

## 2023-10-16 PROCEDURE — 93010 ELECTROCARDIOGRAM REPORT: CPT | Mod: ,,, | Performed by: INTERNAL MEDICINE

## 2023-10-16 PROCEDURE — 93005 ELECTROCARDIOGRAM TRACING: CPT

## 2023-10-16 PROCEDURE — 96361 HYDRATE IV INFUSION ADD-ON: CPT

## 2023-10-16 PROCEDURE — 25500020 PHARM REV CODE 255: Performed by: EMERGENCY MEDICINE

## 2023-10-16 RX ORDER — HEPARIN 100 UNIT/ML
5 SYRINGE INTRAVENOUS ONCE
Status: COMPLETED | OUTPATIENT
Start: 2023-10-16 | End: 2023-10-16

## 2023-10-16 RX ORDER — NALOXONE HCL 0.4 MG/ML
0.4 VIAL (ML) INJECTION
Status: COMPLETED | OUTPATIENT
Start: 2023-10-16 | End: 2023-10-16

## 2023-10-16 RX ORDER — CEPHALEXIN 500 MG/1
500 CAPSULE ORAL 4 TIMES DAILY
Qty: 20 CAPSULE | Refills: 0 | Status: SHIPPED | OUTPATIENT
Start: 2023-10-16 | End: 2023-10-16 | Stop reason: SDUPTHER

## 2023-10-16 RX ORDER — KETOROLAC TROMETHAMINE 30 MG/ML
15 INJECTION, SOLUTION INTRAMUSCULAR; INTRAVENOUS
Status: COMPLETED | OUTPATIENT
Start: 2023-10-16 | End: 2023-10-16

## 2023-10-16 RX ORDER — CEPHALEXIN 500 MG/1
500 CAPSULE ORAL 4 TIMES DAILY
Qty: 20 CAPSULE | Refills: 0 | Status: SHIPPED | OUTPATIENT
Start: 2023-10-16 | End: 2023-10-21

## 2023-10-16 RX ADMIN — NALXONE HYDROCHLORIDE 0.4 MG: 0.4 INJECTION INTRAMUSCULAR; INTRAVENOUS; SUBCUTANEOUS at 10:10

## 2023-10-16 RX ADMIN — IOHEXOL 75 ML: 350 INJECTION, SOLUTION INTRAVENOUS at 01:10

## 2023-10-16 RX ADMIN — SODIUM CHLORIDE 1000 ML: 9 INJECTION, SOLUTION INTRAVENOUS at 10:10

## 2023-10-16 RX ADMIN — HEPARIN 500 UNITS: 100 SYRINGE at 04:10

## 2023-10-16 RX ADMIN — CEFTRIAXONE SODIUM 2 G: 2 INJECTION, POWDER, FOR SOLUTION INTRAMUSCULAR; INTRAVENOUS at 12:10

## 2023-10-16 RX ADMIN — KETOROLAC TROMETHAMINE 15 MG: 30 INJECTION, SOLUTION INTRAMUSCULAR; INTRAVENOUS at 04:10

## 2023-10-16 NOTE — ED TRIAGE NOTES
Pt reports to the ED for CC of hypotension secondary to accidentally consuming 3 BP medications. Pt BP is 79/60 upon arrival. Pt states she was having a HA which she attributed to high BP. Pt has hx of HTN but does not have a bp cuff at home to monitor her BP. Pt took 1 clonidine, and still felt the HA so she took another. Pt states she began feeling anxious so she thought she was taking an anxiety medication and it was actually a 3rd clonidine. Pt endorses dizziness and weakness. Pt also endorses dysuria, flank pain, and bladder spasms. Pt was recently treated for UTI. Pt has MS. Pt denies chest pain, SOB, LOC, falls. Pt is aoox and in NAD at this time.  at bedside.

## 2023-10-16 NOTE — ED NOTES
Assumed care of 44 yo female who presented to the ED with c/o hypotension after taking too many of her prescribed clonidine as well as a possible UTI. Patient has a previous medical diagnosis of MS. Patient is AAOx3, VSS, NAD. Denies CP, SOB, N/V/D, cough, fever, numbness, or tingling. Bed locked, in lowest position, bed rails up x2, call light in reach, all monitoring attached, family at bedside.

## 2023-10-16 NOTE — ED PROVIDER NOTES
Encounter Date: 10/16/2023    SCRIBE #1 NOTE: I, Cherie Tubbs, am scribing for, and in the presence of,  Thomas Carter MD. I have scribed the following portions of the note - Other sections scribed: HPI, ROS.       History     Chief Complaint   Patient presents with    Fatigue     Pt reports weakness x2-3 days,  states patient has been taking more BP meds than prescribed. Pt reports does not have a BP cuff at home and even took 's carvedilol medication. Pt took 3 clonidine this AM around midnight. Dizziness noted.       CC: Weakness    HPI: This is a 43 y.o.female patient, with a PMHx of HTN and Multiple Sclerosis, presenting to the ED for further evaluation of weakness beginning this morning s/p taking too much BP medications. Patient states she had hypertension for the past couple of days. Patient states she took 3 Clonidine 0.1 mg pills over the course from 11 pm-8 am. Patient states associated symptoms of diaphoresis, headache, tinnitus, abdominal pain, malodorous urine, urinary frequency, lightheadedness, and dizziness. Patient denies having an episode of taking too many BP medications in the past. Patient reports she is wheel-chair bound. Patient endorses vaping and occasional alcohol use. Patient's neurologist is Gordon Acevedo MD. Patient denies any other associated symptoms. No prior Tx. No alleviating or aggravating factors.         The history is provided by the patient and the spouse. No  was used.     Review of patient's allergies indicates:   Allergen Reactions    Metoclopramide hcl     Tramadol Anaphylaxis     Past Medical History:   Diagnosis Date    Depression     Hypertension     Multiple sclerosis      Past Surgical History:   Procedure Laterality Date     SECTION  2006    DILATION AND CURETTAGE OF UTERUS      LITHOTRIPSY      PORTACATH PLACEMENT Right 02/10/2020     Family History   Adopted: Yes     Social History     Tobacco Use     Smoking status: Former     Types: Vaping with nicotine    Smokeless tobacco: Former   Substance Use Topics    Alcohol use: Yes     Comment: occassionally    Drug use: Never     Review of Systems   Constitutional:  Positive for diaphoresis.   HENT: Negative.          (+) tinnitus   Eyes: Negative.    Respiratory: Negative.     Cardiovascular: Negative.    Gastrointestinal:  Positive for abdominal pain.   Genitourinary:  Positive for frequency.        (+) malodorous urine   Musculoskeletal: Negative.    Skin: Negative.    Neurological:  Positive for dizziness, weakness, light-headedness and headaches.       Physical Exam     Initial Vitals   BP Pulse Resp Temp SpO2   10/16/23 0905 10/16/23 0909 10/16/23 0909 10/16/23 0909 10/16/23 0909   (!) 79/60 72 (!) 22 98.1 °F (36.7 °C) 98 %      MAP       --                Physical Exam    Nursing note and vitals reviewed.  Constitutional: She is not diaphoretic. No distress.   HENT:   Head: Normocephalic and atraumatic.   Nose: Nose normal.   Eyes: EOM are normal. Pupils are equal, round, and reactive to light.   Neck: Neck supple. No JVD present.   Normal range of motion.  Cardiovascular:  Normal rate, regular rhythm, normal heart sounds and intact distal pulses.           Pulmonary/Chest: Breath sounds normal. No stridor. No respiratory distress. She has no wheezes. She has no rales.   Abdominal: Abdomen is soft. Bowel sounds are normal. She exhibits no distension. There is abdominal tenderness (mild suprapubic, mild left lower quadrant). There is no rebound and no guarding.   Musculoskeletal:         General: No tenderness or edema. Normal range of motion.      Cervical back: Normal range of motion and neck supple.     Neurological: She is alert and oriented to person, place, and time.   4/5 strength bilateral lower extremities.   Skin: Skin is warm and dry. Capillary refill takes less than 2 seconds. No rash noted. No erythema.         ED Course   Procedures  Labs Reviewed    CULTURE, URINE - Abnormal; Notable for the following components:       Result Value    Urine Culture, Routine   (*)     Value: ESCHERICHIA COLI  >100,000 cfu/ml      All other components within normal limits    Narrative:     Specimen Source->Urine   CBC W/ AUTO DIFFERENTIAL - Abnormal; Notable for the following components:    MCH 31.1 (*)     Gran % 75.2 (*)     Lymph % 14.9 (*)     All other components within normal limits   COMPREHENSIVE METABOLIC PANEL - Abnormal; Notable for the following components:    Sodium 134 (*)     Glucose 297 (*)     Albumin 3.4 (*)     Alkaline Phosphatase 145 (*)     AST 62 (*)     ALT 79 (*)     All other components within normal limits   URINALYSIS, REFLEX TO URINE CULTURE - Abnormal; Notable for the following components:    Appearance, UA Hazy (*)     Protein, UA 1+ (*)     Nitrite, UA Positive (*)     Urobilinogen, UA 2.0-3.0 (*)     Leukocytes, UA 3+ (*)     All other components within normal limits    Narrative:     Specimen Source->Urine   TSH - Abnormal; Notable for the following components:    TSH 4.138 (*)     All other components within normal limits   URINALYSIS MICROSCOPIC - Abnormal; Notable for the following components:    RBC, UA 18 (*)     WBC, UA >100 (*)     WBC Clumps, UA Moderate (*)     Hyaline Casts, UA 3 (*)     All other components within normal limits    Narrative:     Specimen Source->Urine   CULTURE, BLOOD   CULTURE, BLOOD   LACTIC ACID, PLASMA   T4, FREE   POCT URINE PREGNANCY        ECG Results              EKG 12-lead (Final result)  Result time 10/16/23 17:13:03      Final result by Interface, Lab In [facility] (10/16/23 17:13:03)                   Narrative:    Test Reason : R06.02,    Vent. Rate : 073 BPM     Atrial Rate : 073 BPM     P-R Int : 152 ms          QRS Dur : 082 ms      QT Int : 410 ms       P-R-T Axes : 047 088 053 degrees     QTc Int : 451 ms    Normal sinus rhythm  Normal ECG  When compared with ECG of 15-MAY-2023 05:55,  No significant  change was found  Confirmed by Reid Carr MD ([id number]) on 10/16/2023 5:12:50 PM    Referred By: [email or web address]   SELF           Confirmed By:Reid Carr MD                                  Imaging Results              CT Abdomen Pelvis With Contrast (Final result)  Result time 10/16/23 15:45:33      Final result by Sunny Ya MD (10/16/23 15:45:33)                   Impression:      No acute intra-abdominal or pelvic process.    Diverticulosis coli without evidence of acute diverticulitis.    Resolution of previous acute pancreatitis.    Hepatic steatosis along with hepatomegaly.    Left-sided nonobstructing nephrolithiasis.    Additional findings as above.      Electronically signed by: Sunny Ya MD  Date:    10/16/2023  Time:    15:45               Narrative:    EXAMINATION:  CT ABDOMEN PELVIS WITH CONTRAST    CLINICAL HISTORY:  LLQ abdominal pain;    TECHNIQUE:  Low dose axial images, sagittal and coronal reformations were obtained from the lung bases to the pubic symphysis following the IV administration of 75 mL of Omnipaque 350 .  Oral contrast was not given.    COMPARISON:  Abdomen ultrasound dated 08/21/2023.    CT scan of the abdomen pelvis dated 05/15/2023.    FINDINGS:  There are no pleural effusions.  There is no evidence of a pneumothorax.  No airspace opacity is present.  No pulmonary nodules identified.    The heart is unremarkable.  There is normal tapering of the abdominal aorta.  The minimal calcifications in the infrarenal abdominal aorta.  The celiac trunk, SMA, and JEFF are within normal limits.  The portal veins and mesenteric veins are within normal limits.  The IVC and the remainder of the venous structures are within normal limits.    There is no evidence of lymphadenopathy in the abdomen or pelvis.    The esophagus, stomach, and duodenum are within normal limits.  The small bowel loops are unremarkable.  The appendix is unremarkable.  There is large amount of stool within the large  bowel.  There is colonic diverticula without evidence of acute diverticulitis.  There is no evidence of bowel obstruction.    The liver is enlarged.  There is a hepatic steatosis.  The gallbladder is within normal limits.  The biliary tree is within normal limits.  The spleen is unremarkable.  The pancreas is atrophic.  There has been resolution of the previous peripancreatic inflammatory changes.  The adrenal glands are unremarkable.    There is stable dysmorphic and markedly atretic appearance of the right kidney.  There is compensatory hypertrophy of the left kidney.  There is unchanged 1.4 cm low-attenuation lesion in the interpolar regions of the left kidney.  There is a 4 mm nonobstructing stone in the upper pole of the left kidney.  There is a 5 nonobstructing stone in the lower pole of the left kidney.  The left ureter is unremarkable.  The urinary bladder is within normal limits.  The uterus and adnexal structures are within normal limits.    There is no evidence of free fluid in the abdomen or pelvis.  There is no evidence of free air.  There is no evidence of pneumatosis.  No portal venous air is identified.    The psoas margins are unremarkable.  There is an umbilical hernia containing omental fat.  The remainder of the abdominal wall is unremarkable.  There are degenerative changes in the osseous structures.  There is no evidence of a fracture.                                       X-Ray Chest 1 View (Final result)  Result time 10/16/23 11:42:07      Final result by Myron Childress MD (10/16/23 11:42:07)                   Impression:      Suboptimal inspiratory result, without acute radiographic abnormality in the visualized chest.      Electronically signed by: Myron Childress  Date:    10/16/2023  Time:    11:42               Narrative:    EXAMINATION:  XR CHEST 1 VIEW    CLINICAL HISTORY:  Other fatigue    TECHNIQUE:  Single frontal view of the chest was performed.    COMPARISON:  Portable AP chest  "x-ray 04/07/2023    FINDINGS:  Rightward rotation of the patient's torso.    Suboptimal inspiratory result.    EKG leads project upon the chest.    Midline thoracic trachea.    Normal sized cardiopericardial silhouette.    The tip of a right subclavian infusion port again projects in/over the anticipated region of the superior vena cava.  The hub of this port demonstrates a triangular outer contour, bears the abbreviation "CT", and contains a right-angle needle.    No consolidation, pneumothorax, or pleural fluid is apparent radiographically.  Hazy opacity overlying the peripheral aspect of the left lower hemithorax is probably related to summation defects involving the chest wall soft tissues.    Osseous detail is suboptimally visualized, especially in the spine.                                       Medications   sodium chloride 0.9% bolus 1,000 mL 1,000 mL (0 mLs Intravenous Stopped 10/16/23 1222)   naloxone 0.4 mg/mL injection 0.4 mg (0.4 mg Intravenous Given 10/16/23 1004)   cefTRIAXone (ROCEPHIN) 2 g in dextrose 5 % in water (D5W) 100 mL IVPB (MB+) (0 g Intravenous Stopped 10/16/23 1305)   iohexoL (OMNIPAQUE 350) injection 75 mL (75 mLs Intravenous Given 10/16/23 1353)   ketorolac injection 15 mg (15 mg Intravenous Given 10/16/23 1603)   heparin, porcine (PF) 100 unit/mL injection flush 500 Units (500 Units Intravenous Given 10/16/23 1619)     Medical Decision Making  Amount and/or Complexity of Data Reviewed  Labs: ordered.  Radiology: ordered.  ECG/medicine tests: ordered.    Risk  Prescription drug management.      MDM:    43-year-old female with past medical history as noted above presenting with fatigue.  Physical exam as noted above.  ED workup notable for urine preg negative, urinalysis positive nitrites, rare bacteria, urine culture pending, white blood cell count 7.34, hemoglobin 12.8, CMP appears to be baseline mild elevation in AST ALT, TSH 4.138, free T4 within normal limits, CT abdomen pelvis " shows no acute intra-abdominal pelvic process, additional chronic findings as noted above, chest x-ray shows suboptimal inspiratory result without acute findings present, EKG shows normal sinus rhythm rate of 73 beats per minute, normal-appearing intervals, normal appearing EKG,  MS, no STEMI.  Patient presentation consistent with fatigue, acute cystitis, treated here with Rocephin, pending urine cultures will treat outpatient with Keflex.  Patient does not appear septic, clonidine appears to have worn off as her blood pressure is now mildly hypertensive and patient appears significantly improved when compared to prior presentation.  Advised her on further treatment, avoidance of clonidine if she does not know what her blood pressure is and follow-up needed with PCP.  Discussed diagnosis and further treatment with patient and family at bedside, including f/u.  Return precautions given and all questions answered.  Patient and family in understanding of plan.  Pt discharged to home improved and stable.        Note was created using voice recognition software. Note may have occasional typographical or grammatical errors, garbled syntax, and other bizarre constructions that may not have been identified and edited despite good demetra initial review prior to signing.               Scribe Attestation:   Scribe #1: I performed the above scribed service and the documentation accurately describes the services I performed. I attest to the accuracy of the note.                        Clinical Impression:   Final diagnoses:  [R06.02] SOB (shortness of breath)  [R53.83] Fatigue  [N30.00] Acute cystitis without hematuria (Primary)        ED Disposition Condition    Discharge Stable          ED Prescriptions       Medication Sig Dispense Start Date End Date Auth. Provider    cephALEXin (KEFLEX) 500 MG capsule  (Status: Discontinued) Take 1 capsule (500 mg total) by mouth 4 (four) times daily. for 5 days 20 capsule 10/16/2023  10/16/2023 Thomas Carter MD    cephALEXin (KEFLEX) 500 MG capsule Take 1 capsule (500 mg total) by mouth 4 (four) times daily. for 5 days 20 capsule 10/16/2023 10/21/2023 Thomas Carter MD          Follow-up Information       Follow up With Specialties Details Why Contact [facility] Emergency Dept Emergency Medicine Go to  If symptoms worsen [address] [place] [id number] [phone]    Rula Betancourt MD Internal Medicine Go in 1 week As needed [address] [facility]  [address]  [address]  [facility] [id number] [phone]            I, Thomas Carter M.D., personally performed the services described in this documentation. All medical record entries made by the scribe were at my direction and in my presence. I have reviewed the chart and agree that the record reflects my personal performance and is accurate and complete.       Thomas Carter MD  10/18/23 1016

## 2023-10-18 LAB — BACTERIA UR CULT: ABNORMAL

## 2023-10-20 LAB
BACTERIA BLD CULT: NORMAL
BACTERIA BLD CULT: NORMAL

## 2023-11-20 PROBLEM — N39.0 ACUTE UTI (URINARY TRACT INFECTION): Status: RESOLVED | Noted: 2023-08-20 | Resolved: 2023-11-20

## 2024-01-10 RX ORDER — LEVOTHYROXINE SODIUM 50 UG/1
50 TABLET ORAL
Qty: 90 TABLET | Refills: 3 | Status: SHIPPED | OUTPATIENT
Start: 2024-01-10 | End: 2024-02-23 | Stop reason: SDUPTHER

## 2024-01-10 NOTE — TELEPHONE ENCOUNTER
----- Message from Tammy Kyle sent at 1/10/2024  9:55 AM CST -----  Contact: [facility]  Type:  RX Refill Request    Who Called: [facility]  Refill or New Rx:refill  RX Name and Strength:levothyroxine (SYNTHROID) 50 MCG tablet   How is the patient currently taking it? (ex. 1XDay):as prescribed  Is this a 30 day or 90 day RX:90  Preferred Pharmacy with phone number:  [id number] - [place], [place]  [facility] [address] [address]  Phone: [phone] Fax: [phone]  Local or Mail Order:local  Ordering Provider:Dr. Betancourt  Would the patient rather a call back or a response via [facility]? call  Best Call Back Number:[phone]  Additional Information: Reports faxing a request on 12/28/23 and 1/2/24 and request to a prescription refill on behalf of patient.   Thank you,  GH

## 2024-01-18 ENCOUNTER — OFFICE VISIT (OUTPATIENT)
Dept: PRIMARY CARE CLINIC | Facility: CLINIC | Age: 44
End: 2024-01-18
Payer: MEDICAID

## 2024-01-18 VITALS
RESPIRATION RATE: 18 BRPM | OXYGEN SATURATION: 96 % | TEMPERATURE: 97 F | SYSTOLIC BLOOD PRESSURE: 148 MMHG | HEART RATE: 55 BPM | HEIGHT: 60 IN | DIASTOLIC BLOOD PRESSURE: 95 MMHG | BODY MASS INDEX: 29.29 KG/M2

## 2024-01-18 DIAGNOSIS — R30.0 DYSURIA: ICD-10-CM

## 2024-01-18 DIAGNOSIS — Z12.4 ENCOUNTER FOR PAPANICOLAOU SMEAR OF CERVIX: ICD-10-CM

## 2024-01-18 DIAGNOSIS — I10 ESSENTIAL HYPERTENSION: ICD-10-CM

## 2024-01-18 DIAGNOSIS — Z12.31 BREAST CANCER SCREENING BY MAMMOGRAM: Primary | ICD-10-CM

## 2024-01-18 DIAGNOSIS — E11.9 TYPE 2 DIABETES MELLITUS WITHOUT COMPLICATION, WITHOUT LONG-TERM CURRENT USE OF INSULIN: ICD-10-CM

## 2024-01-18 PROBLEM — G35 MULTIPLE SCLEROSIS EXACERBATION: Status: RESOLVED | Noted: 2023-08-20 | Resolved: 2024-01-18

## 2024-01-18 PROCEDURE — 4010F ACE/ARB THERAPY RXD/TAKEN: CPT | Mod: CPTII,S$GLB,, | Performed by: INTERNAL MEDICINE

## 2024-01-18 PROCEDURE — 3008F BODY MASS INDEX DOCD: CPT | Mod: CPTII,S$GLB,, | Performed by: INTERNAL MEDICINE

## 2024-01-18 PROCEDURE — 3080F DIAST BP >= 90 MM HG: CPT | Mod: CPTII,S$GLB,, | Performed by: INTERNAL MEDICINE

## 2024-01-18 PROCEDURE — 1159F MED LIST DOCD IN RCRD: CPT | Mod: CPTII,S$GLB,, | Performed by: INTERNAL MEDICINE

## 2024-01-18 PROCEDURE — 1160F RVW MEDS BY RX/DR IN RCRD: CPT | Mod: CPTII,S$GLB,, | Performed by: INTERNAL MEDICINE

## 2024-01-18 PROCEDURE — 3077F SYST BP >= 140 MM HG: CPT | Mod: CPTII,S$GLB,, | Performed by: INTERNAL MEDICINE

## 2024-01-18 PROCEDURE — 99214 OFFICE O/P EST MOD 30 MIN: CPT | Mod: S$GLB,,, | Performed by: INTERNAL MEDICINE

## 2024-01-18 RX ORDER — AMLODIPINE BESYLATE 5 MG/1
5 TABLET ORAL DAILY
Qty: 30 TABLET | Refills: 11 | Status: CANCELLED | OUTPATIENT
Start: 2024-01-18 | End: 2025-01-17

## 2024-01-18 RX ORDER — INSULIN PUMP SYRINGE, 3 ML
EACH MISCELLANEOUS
Qty: 1 EACH | Refills: 0 | Status: SHIPPED | OUTPATIENT
Start: 2024-01-18 | End: 2025-01-17

## 2024-01-18 RX ORDER — TIZANIDINE 4 MG/1
4 TABLET ORAL EVERY 8 HOURS
COMMUNITY
Start: 2023-10-25

## 2024-01-18 RX ORDER — GLIMEPIRIDE 2 MG/1
2 TABLET ORAL
Qty: 90 TABLET | Refills: 3 | Status: SHIPPED | OUTPATIENT
Start: 2024-01-18 | End: 2025-01-17

## 2024-01-18 RX ORDER — SULFAMETHOXAZOLE AND TRIMETHOPRIM 800; 160 MG/1; MG/1
1 TABLET ORAL 2 TIMES DAILY
Qty: 14 TABLET | Refills: 0 | Status: SHIPPED | OUTPATIENT
Start: 2024-01-18 | End: 2024-04-03

## 2024-01-18 RX ORDER — LOSARTAN POTASSIUM 50 MG/1
50 TABLET ORAL DAILY
Qty: 90 TABLET | Refills: 3 | Status: SHIPPED | OUTPATIENT
Start: 2024-01-18 | End: 2025-01-17

## 2024-01-18 NOTE — PROGRESS NOTES
Subjective:      Patient ID: Janis Reese is a 43 y.o. female.    Chief Complaint: Hypertension (F/u ) and Annual Exam (F/u c/o urinary frequency/urgency, burning with urine, onset 1wk prior)    Patient with history of multiple sclerosis diagnosed at age of 22.  Patient has bilateral leg weakness and diffuse muscle pains. Muscle pains are controlled with Suboxone. (Being managed by Patient Psychiatrist).  Patient also has bipolar disorder and is under good control.  Patient reports the pain in th legs is intermittent, sharp squeezing in nature, the muscles are stiff and painful. Patient is being followed by neurologist in [place] Dr. Acevedo.  Patient wants to see neurologist.     Patient with hypertension and blood pressure seem to be running high.  Patient is on verapamil and reports compliance with medication.  Patient denies any chest pain shortness of breath, ankle swelling.     Patient has diabetes with last A1c of 7.1 suggest poorly-controlled diabetes.  Home blood sugars are not being monitored.  Patient was prescribed metformin but did not tolerate the medication as it was causing severe nausea and vomiting.  Patient today's random blood sugar of 273 is high.  Patient has previous history of pancreatitis and so GLP 1 May not be a good option also patient has recurrent UTI and so SGLT2 may not be an option as well.     Patient today also complains of urinary frequency for 1 week.  Patient also has urgency, burning in urine, foul-smelling urine, cloudy urine also complains of some suprapubic pain.  No fever, no flank pain    Review of Systems   Constitutional:  Negative for chills, diaphoresis, fever, malaise/fatigue and weight loss.   HENT:  Negative for congestion, ear pain, sinus pain, sore throat and tinnitus.    Eyes:  Negative for blurred vision and photophobia.   Respiratory:  Negative for cough, hemoptysis, shortness of breath and wheezing.    Cardiovascular:  Negative for chest pain,  palpitations, orthopnea, leg swelling and PND.   Gastrointestinal:  Negative for abdominal pain, blood in stool, constipation, diarrhea, heartburn, melena, nausea and vomiting.   Genitourinary:  Positive for dysuria and frequency. Negative for urgency.   Musculoskeletal:  Negative for back pain, myalgias and neck pain.   Skin:  Negative for rash.   Neurological:  Positive for weakness (bilateral legs). Negative for dizziness, tremors, seizures and loss of consciousness.   Endo/Heme/Allergies:  Negative for polydipsia.   Psychiatric/Behavioral:  Negative for depression and hallucinations. The patient does not have insomnia.      Objective:   BP (!) 148/95 (BP Location: Left arm, Patient Position: Sitting, BP Method: Small (Automatic))   Pulse (!) 55   Temp 97.2 °F (36.2 °C) (Temporal)   Resp 18   Ht 5' (1.524 m)   SpO2 96%   BMI 29.29 kg/m²     Physical Exam  Constitutional:       General: She is not in acute distress.     Appearance: She is not diaphoretic.   Neck:      Thyroid: No thyromegaly.   Cardiovascular:      Rate and Rhythm: Normal rate and regular rhythm.      Heart sounds: Normal heart sounds. No murmur heard.  Pulmonary:      Effort: Pulmonary effort is normal. No respiratory distress.      Breath sounds: Normal breath sounds. No wheezing.   Abdominal:      General: Bowel sounds are normal. There is no distension.      Palpations: Abdomen is soft.      Tenderness: There is no abdominal tenderness.   Musculoskeletal:      Comments: Bilateral leg weakness, strength is 2/5   Lymphadenopathy:      Cervical: No cervical adenopathy.   Neurological:      Mental Status: She is alert and oriented to person, place, and time.   Psychiatric:         Behavior: Behavior normal.         Thought Content: Thought content normal.         Judgment: Judgment normal.       Assessment:       ICD-10-CM ICD-9-CM   1. Breast cancer screening by mammogram  Z12.31 V76.12   2. Encounter for Papanicolaou smear of cervix  Z12.4  V76.2   3. Type 2 diabetes mellitus without complication, without long-term current use of insulin  E11.9 250.00   4. Essential hypertension  I10 401.9   5. Dysuria  R30.0 788.1       Plan:     Patient with multiple medical problem including multiple sclerosis and is being followed by neurologist  Patient requests referral to neurologist in [place] that is closer to house.  Advised patient to find the name of neurologist who takes her insurance and will refer  Patient has diabetes with last A1c of 7.1 is not at goal  Patient did not tolerate metformin and is not candidate for GLP 1 or SGLT2  Will use glimepiride 2 mg  Patient to monitor blood sugars at home and bring log  Will call in Glucometer and test strips  Patient has hypertension and blood pressure seem to be running high  Patient is on verapamil.  Will stop the medication and changed to losartan 50 mg  Patient complains of dysuria, and has symptoms suggestive of urinary tract infection  Will treat with ciprofloxacin + will check urinalysis  Patient is accompanied by daughter in law who is a nurse.  She agreed to check blood sugar and blood pressures at home  Medication List with Changes/Refills   New Medications    BLOOD SUGAR DIAGNOSTIC (TRUE METRIX GLUCOSE TEST STRIP) STRP    1 strip by Misc.(Non-Drug; Combo Route) route once daily.    BLOOD-GLUCOSE METER (TRUE METRIX GLUCOSE METER) KIT    Use as instructed    GLIMEPIRIDE (AMARYL) 2 MG TABLET    Take 1 tablet (2 mg total) by mouth before breakfast.    LANCETS 32 GAUGE MISC    1 lancet  by Misc.(Non-Drug; Combo Route) route once daily.    LOSARTAN (COZAAR) 50 MG TABLET    Take 1 tablet (50 mg total) by mouth once daily.    SULFAMETHOXAZOLE-TRIMETHOPRIM 800-160MG (BACTRIM DS) 800-160 MG TAB    Take 1 tablet by mouth 2 (two) times daily.   Current Medications    BUPRENORPHINE-NALOXONE 8-2 MG (SUBOXONE) 8-2 MG    Place 1 each under the tongue 2 (two) times a day.    BUPROPION (WELLBUTRIN XL) 300 MG 24 HR  TABLET    Take 300 mg by mouth once daily.    CLONIDINE (CATAPRES) 0.1 MG TABLET    Take 0.1 mg by mouth 3 (three) times daily as needed (withdrawal, anxiety, insomnia).    ESCITALOPRAM OXALATE (LEXAPRO) 10 MG TABLET    Take 10 mg by mouth once daily.    KESIMPTA PEN 20 MG/0.4 ML PNIJ    SMARTSI Milligram(s) SUB-Q Once a Month    LAMOTRIGINE (LAMICTAL) 100 MG TABLET    Take 100 mg by mouth every morning.    LEVOTHYROXINE (SYNTHROID) 50 MCG TABLET    Take 1 tablet (50 mcg total) by mouth before breakfast.    QUETIAPINE (SEROQUEL) 100 MG TAB    Take 1 tablet (100 mg total) by mouth every evening.    TIZANIDINE (ZANAFLEX) 4 MG TABLET    Take 4 mg by mouth every 8 (eight) hours.   Discontinued Medications    METFORMIN (GLUCOPHAGE) 500 MG TABLET    Take 1 tablet (500 mg total) by mouth 2 (two) times daily with meals.    VERAPAMIL (CALAN-SR) 120 MG CR TABLET    TAKE 1 TABLET BY MOUTH EVERY MORNING BEFORE BREAKFAST

## 2024-01-22 ENCOUNTER — PATIENT MESSAGE (OUTPATIENT)
Dept: PSYCHIATRY | Facility: CLINIC | Age: 44
End: 2024-01-22
Payer: MEDICAID

## 2024-01-23 ENCOUNTER — TELEPHONE (OUTPATIENT)
Dept: PRIMARY CARE CLINIC | Facility: CLINIC | Age: 44
End: 2024-01-23
Payer: MEDICAID

## 2024-01-23 NOTE — TELEPHONE ENCOUNTER
----- Message from Leighann Mcdonald sent at 1/23/2024  2:49 PM CST -----  Contact: Patient  Patient called to consult with nurse or staff regarding home health services. She states she forgot to mention this to Dr. Betancourt at her appointment on last week and wanted to see if she can get a referral for home health in [place], [place]. She would like a call back regarding this and can be reached at [phone]. Thanks/MR

## 2024-01-23 NOTE — TELEPHONE ENCOUNTER
"Pt called to state:      "Patient called to consult with nurse or staff regarding home health services. She states she forgot to mention this to Dr. Betancourt at her appointment on last week and wanted to see if she can get a referral for home health in [place], [place]. "  "

## 2024-01-24 ENCOUNTER — TELEPHONE (OUTPATIENT)
Dept: PRIMARY CARE CLINIC | Facility: CLINIC | Age: 44
End: 2024-01-24
Payer: MEDICAID

## 2024-01-24 NOTE — TELEPHONE ENCOUNTER
Advised pt to get in touch with insurance company, to see which home health facility is within her network.

## 2024-01-24 NOTE — TELEPHONE ENCOUNTER
Not many home health take Medicaid.  Please call your insurance and ask for any home health that takes her insurance and will refer you there

## 2024-02-19 ENCOUNTER — HOSPITAL ENCOUNTER (EMERGENCY)
Facility: HOSPITAL | Age: 44
Discharge: HOME | End: 2024-02-19
Attending: INTERNAL MEDICINE
Payer: MEDICAID

## 2024-02-19 VITALS
WEIGHT: 150 LBS | OXYGEN SATURATION: 97 % | SYSTOLIC BLOOD PRESSURE: 156 MMHG | DIASTOLIC BLOOD PRESSURE: 91 MMHG | HEART RATE: 100 BPM | HEIGHT: 60 IN | BODY MASS INDEX: 29.45 KG/M2 | RESPIRATION RATE: 18 BRPM | TEMPERATURE: 98 F

## 2024-02-19 DIAGNOSIS — K04.7 DENTAL ABSCESS: Primary | ICD-10-CM

## 2024-02-19 PROCEDURE — 96372 THER/PROPH/DIAG INJ SC/IM: CPT | Performed by: INTERNAL MEDICINE

## 2024-02-19 PROCEDURE — 63600175 PHARM REV CODE 636 W HCPCS: Performed by: INTERNAL MEDICINE

## 2024-02-19 PROCEDURE — 99284 EMERGENCY DEPT VISIT MOD MDM: CPT | Mod: 25

## 2024-02-19 RX ORDER — MELOXICAM 7.5 MG/1
7.5 TABLET ORAL 2 TIMES DAILY PRN
Qty: 20 TABLET | Refills: 0 | Status: SHIPPED | OUTPATIENT
Start: 2024-02-19

## 2024-02-19 RX ORDER — ONDANSETRON HYDROCHLORIDE 2 MG/ML
4 INJECTION, SOLUTION INTRAVENOUS
Status: COMPLETED | OUTPATIENT
Start: 2024-02-19 | End: 2024-02-19

## 2024-02-19 RX ORDER — HYDROCODONE BITARTRATE AND ACETAMINOPHEN 5; 325 MG/1; MG/1
1 TABLET ORAL EVERY 4 HOURS PRN
Status: DISCONTINUED | OUTPATIENT
Start: 2024-02-19 | End: 2024-02-19

## 2024-02-19 RX ORDER — CLINDAMYCIN HYDROCHLORIDE 300 MG/1
300 CAPSULE ORAL EVERY 6 HOURS
Qty: 40 CAPSULE | Refills: 0 | Status: SHIPPED | OUTPATIENT
Start: 2024-02-19 | End: 2024-02-29

## 2024-02-19 RX ADMIN — ONDANSETRON 4 MG: 2 INJECTION INTRAMUSCULAR; INTRAVENOUS at 08:02

## 2024-02-19 NOTE — ED PROVIDER NOTES
2024         8:04 AM    Source of History:  History obtained from patient and significant other.     Chief complaint:  From Nurse Triage:  Dental Pain (Complains of pain and dental abscess to left upper pallet x 2 weeks. Reports sudden worsening of symptoms over the last 3 days. Complains of nausea. Patient is on suboxone. )    HISTORY OF PRESENT ILLNES:  Janis Reese is a 43 y.o. female  has a past medical history of Depression, Hypertension, and Multiple sclerosis. presenting with dental abscess for 3 days    REVIEW OF SYSTEMS:   Constitutional symptoms:     Skin symptoms:      Eye symptoms:     ENMT symptoms:      Respiratory symptoms:      Cardiovascular symptoms:     Gastrointestinal symptoms:      Genitourinary symptoms:     Musculoskeletal symptoms:      Neurologic symptoms:      Psychiatric symptoms:               Additional review of systems information: Patient Denies Any Other Complaints.    All Other Systems Reviewed With Patient And Negative.    ALLEGIES:  Review of patient's allergies indicates:   Allergen Reactions    Metoclopramide hcl     Tramadol Anaphylaxis       MEDICINE LIST:  Current Outpatient Medications   Medication Instructions    blood sugar diagnostic (TRUE METRIX GLUCOSE TEST STRIP) Strp 1 strip, Misc.(Non-Drug; Combo Route), Daily    blood-glucose meter (TRUE METRIX GLUCOSE METER) kit Use as instructed    buprenorphine-naloxone 8-2 mg (SUBOXONE) 8-2 mg 1 each, Sublingual, 2 times daily    buPROPion (WELLBUTRIN XL) 300 mg, Oral, Daily    clindamycin (CLEOCIN) 300 mg, Oral, Every 6 hours    cloNIDine (CATAPRES) 0.1 mg, Oral, 3 times daily PRN    EScitalopram oxalate (LEXAPRO) 10 mg, Oral, Daily    glimepiride (AMARYL) 2 mg, Oral, Before breakfast    KESIMPTA PEN 20 mg/0.4 mL PnIj SMARTSI Milligram(s) SUB-Q Once a Month    lamoTRIgine (LAMICTAL) 100 mg, Oral, Every morning    lancets 32 gauge Misc 1 lancet , Misc.(Non-Drug; Combo Route), Daily    levothyroxine (SYNTHROID)  50 mcg, Oral, Before breakfast    losartan (COZAAR) 50 mg, Oral, Daily    QUEtiapine (SEROQUEL) 100 mg, Oral, Nightly    sulfamethoxazole-trimethoprim 800-160mg (BACTRIM DS) 800-160 mg Tab 1 tablet, Oral, 2 times daily    tiZANidine (ZANAFLEX) 4 mg, Oral, Every 8 hours        PMH:  As per HPI and below:    Reviewed and updated in chart.    PAST MEDICAL HISTORY:  Past Medical History:   Diagnosis Date    Depression     Hypertension     Multiple sclerosis         PAST SURGICAL HISTORY:  Past Surgical History:   Procedure Laterality Date     SECTION  2006    DILATION AND CURETTAGE OF UTERUS      LITHOTRIPSY      PORTACATH PLACEMENT Right 02/10/2020       SOCIAL HISTORY:  Social History     Tobacco Use    Smoking status: Former     Types: Vaping with nicotine    Smokeless tobacco: Former   Substance Use Topics    Alcohol use: Yes     Comment: occassionally    Drug use: Never       FAMILY HISTORY:  Family History   Adopted: Yes        PROBLEM LIST:  Patient Active Problem List   Diagnosis    Multiple sclerosis    Essential hypertension    Bipolar 1 disorder    Hypokalemia    Acute pancreatitis    Leukocytosis    Hypercalcemia    Chronic pain    Alcohol abuse    Hyponatremia    Transaminitis    Hypomagnesemia    Hypothyroidism    Type 2 diabetes mellitus, without long-term current use of insulin        PHYSICAL EXAM:      ED Triage Vitals   BP    Pulse    Resp    Temp    SpO2         Vital Signs: Reviewed As In Chart.  General:  Alert, No Cardiorespiratory Distress Noted.  Skin: warm and dry  Eye:   Extraocular Movements Are Intact.   ENT: Mucus membranes are moist.  Draining abscess adjacent to teeth numbers 12 13 and 14, teeth are necrotic and broken off at the gingiva  Cardiovascular:  Normal peripheral perfusion     Respiratory:  Normal respiratory rate    Gastrointestinal:  No distention    Neurological:  Alert And Oriented To Person, Place, Time, And Situation, Normal Motor Observed, Normal Speech  Observed.  Musculoskeletal:  No Gross Deformity Noted.     Psychiatric:  Cooperative.      ED WORKUP FOR MEDICAL DECISION MAKING:    ED ORDERS:  No orders of the defined types were placed in this encounter.      ED MEDICINES:  Medications   HYDROcodone-acetaminophen 5-325 mg per tablet 1 tablet (has no administration in time range)                ED LABS ORDERED AND REVIEWED:  No visits with results within 1 Day(s) from this visit.   Latest known visit with results is:   Admission on 10/16/2023, Discharged on 10/16/2023   Component Date Value Ref Range Status    WBC 10/16/2023 7.34  3.90 - 12.70 K/uL Final    RBC 10/16/2023 4.12  4.00 - 5.40 M/uL Final    Hemoglobin 10/16/2023 12.8  12.0 - 16.0 g/dL Final    Hematocrit 10/16/2023 37.2  37.0 - 48.5 % Final    MCV 10/16/2023 90  82 - 98 fL Final    MCH 10/16/2023 31.1 (H)  27.0 - 31.0 pg Final    MCHC 10/16/2023 34.4  32.0 - 36.0 g/dL Final    RDW 10/16/2023 12.0  11.5 - 14.5 % Final    Platelets 10/16/2023 273  150 - 450 K/uL Final    MPV 10/16/2023 10.9  9.2 - 12.9 fL Final    Immature Granulocytes 10/16/2023 0.4  0.0 - 0.5 % Final    Gran # (ANC) 10/16/2023 5.5  1.8 - 7.7 K/uL Final    Immature Grans (Abs) 10/16/2023 0.03  0.00 - 0.04 K/uL Final    Lymph # 10/16/2023 1.1  1.0 - 4.8 K/uL Final    Mono # 10/16/2023 0.5  0.3 - 1.0 K/uL Final    Eos # 10/16/2023 0.2  0.0 - 0.5 K/uL Final    Baso # 10/16/2023 0.04  0.00 - 0.20 K/uL Final    nRBC 10/16/2023 0  0 /100 WBC Final    Gran % 10/16/2023 75.2 (H)  38.0 - 73.0 % Final    Lymph % 10/16/2023 14.9 (L)  18.0 - 48.0 % Final    Mono % 10/16/2023 6.7  4.0 - 15.0 % Final    Eosinophil % 10/16/2023 2.3  0.0 - 8.0 % Final    Basophil % 10/16/2023 0.5  0.0 - 1.9 % Final    Differential Method 10/16/2023 Automated   Final    Sodium 10/16/2023 134 (L)  136 - 145 mmol/L Final    Potassium 10/16/2023 4.1  3.5 - 5.1 mmol/L Final    Chloride 10/16/2023 98  95 - 110 mmol/L Final    CO2 10/16/2023 27  23 - 29 mmol/L Final     Glucose 10/16/2023 297 (H)  70 - 110 mg/dL Final    BUN 10/16/2023 11  6 - 20 mg/dL Final    Creatinine 10/16/2023 0.9  0.5 - 1.4 mg/dL Final    Calcium 10/16/2023 9.1  8.7 - 10.5 mg/dL Final    Total Protein 10/16/2023 6.2  6.0 - 8.4 g/dL Final    Albumin 10/16/2023 3.4 (L)  3.5 - 5.2 g/dL Final    Total Bilirubin 10/16/2023 0.6  0.1 - 1.0 mg/dL Final    Alkaline Phosphatase 10/16/2023 145 (H)  55 - 135 U/L Final    AST 10/16/2023 62 (H)  10 - 40 U/L Final    ALT 10/16/2023 79 (H)  10 - 44 U/L Final    eGFR 10/16/2023 >60  >60 mL/min/1.73 m^2 Final    Anion Gap 10/16/2023 9  8 - 16 mmol/L Final    Lactate (Lactic Acid) 10/16/2023 1.5  0.5 - 2.2 mmol/L Final    Specimen UA 10/16/2023 Urine, Clean Catch   Final    Color, UA 10/16/2023 Yellow  Yellow, Straw, Lauren Final    Appearance, UA 10/16/2023 Hazy (A)  Clear Final    pH, UA 10/16/2023 7.0  5.0 - 8.0 Final    Specific Gravity, UA 10/16/2023 1.015  1.005 - 1.030 Final    Protein, UA 10/16/2023 1+ (A)  Negative Final    Glucose, UA 10/16/2023 Negative  Negative Final    Ketones, UA 10/16/2023 Negative  Negative Final    Bilirubin (UA) 10/16/2023 Negative  Negative Final    Occult Blood UA 10/16/2023 Negative  Negative Final    Nitrite, UA 10/16/2023 Positive (A)  Negative Final    Urobilinogen, UA 10/16/2023 2.0-3.0 (A)  <2.0 EU/dL Final    Leukocytes, UA 10/16/2023 3+ (A)  Negative Final    Blood Culture, Routine 10/16/2023 No Growth after 4 days.   Final    Blood Culture, Routine 10/16/2023 No Growth after 4 days.   Final    TSH 10/16/2023 4.138 (H)  0.400 - 4.000 uIU/mL Final    RBC, UA 10/16/2023 18 (H)  0 - 4 /hpf Final    WBC, UA 10/16/2023 >100 (H)  0 - 5 /hpf Final    WBC Clumps, UA 10/16/2023 Moderate (A)  None-Rare Final    Bacteria 10/16/2023 Rare  None-Occ /hpf Final    Squam Epithel, UA 10/16/2023 10  /hpf Final    Hyaline Casts, UA 10/16/2023 3 (A)  0-1/lpf /lpf Final    Microscopic Comment 10/16/2023 SEE COMMENT   Final    Urine Culture, Routine  10/16/2023  (A)   Final                    Value:ESCHERICHIA COLI  >100,000 cfu/ml      POC Preg Test, Ur 10/16/2023 Negative  Negative Final     Acceptable 10/16/2023 Yes   Final    Free T4 10/16/2023 0.88  0.71 - 1.51 ng/dL Final       RADIOLOGY STUDIES ORDERED AND REVIEWED:  Imaging Results    None         MEDICAL DECISION MAKING:    Janis Reese is 43 y.o. female who  has a past medical history of Depression, Hypertension, and Multiple sclerosis.   Reviewed Nurses Note. Reviewed Vital Signs.     Reviewed Pertinent old records, History and updated as necessary.    Vitals:    02/19/24 0804   BP: (!) 156/91   Pulse: 100   Resp: 18   Temp: 98.1 °F (36.7 °C)        Medical Decision Making                      PROCEDURES PERFORMED IN ED:  Procedures    DIAGNOSTIC IMPRESSION:        ICD-10-CM ICD-9-CM   1. Dental abscess  K04.7 522.5         ED Disposition Condition    Discharge Stable               Medication List        START taking these medications      clindamycin 300 MG capsule  Commonly known as: CLEOCIN  Take 1 capsule (300 mg total) by mouth every 6 (six) hours. for 10 days            ASK your doctor about these medications      blood sugar diagnostic Strp  Commonly known as: TRUE METRIX GLUCOSE TEST STRIP  1 strip by Misc.(Non-Drug; Combo Route) route once daily.     blood-glucose meter kit  Commonly known as: TRUE METRIX GLUCOSE METER  Use as instructed     buprenorphine-naloxone 8-2 mg 8-2 mg  Commonly known as: SUBOXONE     buPROPion 300 MG 24 hr tablet  Commonly known as: WELLBUTRIN XL     cloNIDine 0.1 MG tablet  Commonly known as: CATAPRES     EScitalopram oxalate 10 MG tablet  Commonly known as: LEXAPRO     glimepiride 2 MG tablet  Commonly known as: AMARYL  Take 1 tablet (2 mg total) by mouth before breakfast.     KESIMPTA PEN 20 mg/0.4 mL Pnij  Generic drug: ofatumumab     lamoTRIgine 100 MG tablet  Commonly known as: LAMICTAL     lancets 32 gauge Misc  1 lancet  by  Misc.(Non-Drug; Combo Route) route once daily.     levothyroxine 50 MCG tablet  Commonly known as: SYNTHROID  Take 1 tablet (50 mcg total) by mouth before breakfast.     losartan 50 MG tablet  Commonly known as: COZAAR  Take 1 tablet (50 mg total) by mouth once daily.     QUEtiapine 100 MG Tab  Commonly known as: SEROQUEL  Take 1 tablet (100 mg total) by mouth every evening.     sulfamethoxazole-trimethoprim 800-160mg 800-160 mg Tab  Commonly known as: BACTRIM DS  Take 1 tablet by mouth 2 (two) times daily.     tiZANidine 4 MG tablet  Commonly known as: ZANAFLEX               Where to Get Your Medications        These medications were sent to [id number] - [address] [address] [address]      Phone: [phone]   clindamycin 300 MG capsule           Follow-up Information       Follow-up with dentist of choice In 1 day.                              ED Prescriptions       Medication Sig Dispense Start Date End Date Auth. Provider    clindamycin (CLEOCIN) 300 MG capsule Take 1 capsule (300 mg total) by mouth every 6 (six) hours. for 10 days 40 capsule 2/19/2024 2/29/2024 Desmond Leroy MD          Follow-up Information       Follow up With Specialties Details Why Contact Info    Follow-up with dentist of choice  In 1 day                 Desmond Leroy MD  02/19/24 6966

## 2024-02-19 NOTE — Clinical Note
Lane zuluaga accompanied their family member to the emergency department on 2/19/2024. They may return to work on 02/20/2024.      If you have any questions or concerns, please don't hesitate to call.      Larry QUIGLEY

## 2024-02-23 ENCOUNTER — HOSPITAL ENCOUNTER (EMERGENCY)
Facility: HOSPITAL | Age: 44
Discharge: HOME | End: 2024-02-23
Attending: EMERGENCY MEDICINE
Payer: MEDICAID

## 2024-02-23 VITALS
BODY MASS INDEX: 29.45 KG/M2 | HEART RATE: 60 BPM | SYSTOLIC BLOOD PRESSURE: 150 MMHG | OXYGEN SATURATION: 99 % | HEIGHT: 60 IN | WEIGHT: 150 LBS | RESPIRATION RATE: 18 BRPM | DIASTOLIC BLOOD PRESSURE: 90 MMHG | TEMPERATURE: 99 F

## 2024-02-23 DIAGNOSIS — S90.32XA CONTUSION OF LEFT FOOT, INITIAL ENCOUNTER: Primary | ICD-10-CM
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
## 2024-02-23 PROCEDURE — 99283 EMERGENCY DEPT VISIT LOW MDM: CPT | Mod: 25

## 2024-02-23 RX ORDER — LEVOTHYROXINE SODIUM 50 UG/1
50 TABLET ORAL
Qty: 90 TABLET | Refills: 3 | Status: SHIPPED | OUTPATIENT
Start: 2024-02-23 | End: 2025-02-22

## 2024-02-23 NOTE — Clinical Note
Lane Reese accompanied their spouse to the emergency department on 2/23/2024. They may return to work on 02/24/2024.      If you have any questions or concerns, please don't hesitate to call.      Colleen QUIGLEY

## 2024-02-23 NOTE — ED PROVIDER NOTES
Encounter Date: 2024       History     Chief Complaint   Patient presents with    Foot Injury     Pt stated that she got her left foot stuck in wheelchair yesterday after falling while transferring from bed to wheelchair. Presents to ED with complaints of left foot pain.      43-year-old female presents to emergency room with left foot pain after getting her foot caught in a wheelchair yesterday and falling out of wheelchair while transferring.  Patient has a history of MS.  Currently on Suboxone via         Review of patient's allergies indicates:   Allergen Reactions    Metoclopramide hcl     Tramadol Anaphylaxis     Past Medical History:   Diagnosis Date    Depression     Hypertension     Multiple sclerosis      Past Surgical History:   Procedure Laterality Date     SECTION  2006    DILATION AND CURETTAGE OF UTERUS      LITHOTRIPSY      PORTACATH PLACEMENT Right 02/10/2020     Family History   Adopted: Yes     Social History     Tobacco Use    Smoking status: Former     Types: Vaping with nicotine    Smokeless tobacco: Former   Substance Use Topics    Alcohol use: Yes     Comment: occassionally    Drug use: Never     Review of Systems   Constitutional:  Negative for fever.   HENT:  Negative for sore throat.    Respiratory:  Negative for shortness of breath.    Cardiovascular:  Negative for chest pain.   Gastrointestinal:  Negative for nausea.   Genitourinary:  Negative for dysuria.   Musculoskeletal:  Positive for gait problem and joint swelling. Negative for back pain.   Skin:  Negative for rash.   Neurological:  Negative for weakness.   Hematological:  Does not bruise/bleed easily.   All other systems reviewed and are negative.      Physical Exam     Initial Vitals [24 1558]   BP Pulse Resp Temp SpO2   (!) 185/104 (!) 56 18 98.6 °F (37 °C) 99 %      MAP       --         Physical Exam    Nursing note and vitals reviewed.  Constitutional: She appears well-developed and well-nourished.    HENT:   Head: Normocephalic and atraumatic.   Eyes: Pupils are equal, round, and reactive to light.   Neck:   Normal range of motion.  Musculoskeletal:         General: Tenderness present.      Cervical back: Normal range of motion.      Comments: Decreased range of motion due to pain     Neurological: She is alert and oriented to person, place, and time.   Skin: Skin is warm and dry.   Psychiatric: She has a normal mood and affect.         ED Course   Procedures  Labs Reviewed - No data to display       Imaging Results              X-Ray Foot Complete Left (In process)                      Medications - No data to display  Medical Decision Making  Amount and/or Complexity of Data Reviewed  Radiology: ordered.                                      Clinical Impression:  Final diagnoses:  [W19.XXXA] Fall  [S90.32XA] Contusion of left foot, initial encounter (Primary)          ED Disposition Condition    Discharge Stable          ED Prescriptions    None       Follow-up Information       Follow up With Specialties Details Why Contact Info    Rula Betancourt MD Internal Medicine  As needed [address]  [address]  [address]  [facility] [id number] [phone]               Lesley Hernandez NP  02/23/24 6745

## 2024-03-04 ENCOUNTER — TELEPHONE (OUTPATIENT)
Dept: PRIMARY CARE CLINIC | Facility: CLINIC | Age: 44
End: 2024-03-04
Payer: MEDICAID

## 2024-03-04 DIAGNOSIS — G35 MULTIPLE SCLEROSIS: Primary | ICD-10-CM

## 2024-03-04 NOTE — TELEPHONE ENCOUNTER
----- Message from Linda Arzola sent at 3/4/2024  4:01 PM CST -----  Type:  Needs Medical Advice    Who Called: Janis Reese    Symptoms (please be specific): -   How long has patient had these symptoms:  -  Pharmacy name and phone #:  -  Would the patient rather a call back or a response via [facility]?    Best Call Back Number: [phone]    Additional Information:  pt needs a referral to a neurologist, Dr Elizabeth Lewis please fax referral  to  [phone]

## 2024-03-06 NOTE — TELEPHONE ENCOUNTER
Referral sent.  Please advised patient to come to clinic to get labs done and blood pressures checked.

## 2024-03-13 ENCOUNTER — HOSPITAL ENCOUNTER (EMERGENCY)
Facility: HOSPITAL | Age: 44
Discharge: HOME | End: 2024-03-13
Attending: INTERNAL MEDICINE
Payer: MEDICAID

## 2024-03-13 VITALS
BODY MASS INDEX: 29.45 KG/M2 | WEIGHT: 150 LBS | DIASTOLIC BLOOD PRESSURE: 95 MMHG | OXYGEN SATURATION: 98 % | RESPIRATION RATE: 20 BRPM | HEART RATE: 84 BPM | TEMPERATURE: 98 F | HEIGHT: 60 IN | SYSTOLIC BLOOD PRESSURE: 146 MMHG

## 2024-03-13 DIAGNOSIS — R31.9 URINARY TRACT INFECTION WITH HEMATURIA, SITE UNSPECIFIED: Primary | ICD-10-CM

## 2024-03-13 DIAGNOSIS — N39.0 URINARY TRACT INFECTION WITH HEMATURIA, SITE UNSPECIFIED: Primary | ICD-10-CM

## 2024-03-13 LAB
ALBUMIN SERPL BCP-MCNC: 3.7 G/DL (ref 3.5–5.2)
ALP SERPL-CCNC: 116 U/L (ref 55–135)
ALT SERPL W/O P-5'-P-CCNC: 97 U/L (ref 10–44)
ANION GAP SERPL CALC-SCNC: 6 MMOL/L (ref 3–11)
AST SERPL-CCNC: 72 U/L (ref 10–40)
B-HCG UR QL: NEGATIVE
BACTERIA #/AREA URNS HPF: ABNORMAL /HPF
BASOPHILS # BLD AUTO: 0.04 K/UL (ref 0–0.2)
BASOPHILS NFR BLD: 0.6 % (ref 0–1.9)
BILIRUB SERPL-MCNC: 0.7 MG/DL (ref 0.1–1)
BILIRUB UR QL STRIP: ABNORMAL
BUN SERPL-MCNC: 12 MG/DL (ref 6–20)
CALCIUM SERPL-MCNC: 9.5 MG/DL (ref 8.7–10.5)
CAOX CRY URNS QL MICRO: ABNORMAL
CHLORIDE SERPL-SCNC: 106 MMOL/L (ref 95–110)
CLARITY UR: ABNORMAL
CO2 SERPL-SCNC: 27 MMOL/L (ref 23–29)
COLOR UR: YELLOW
CREAT SERPL-MCNC: 0.7 MG/DL (ref 0.5–1.4)
DIFFERENTIAL METHOD BLD: ABNORMAL
EOSINOPHIL # BLD AUTO: 0.2 K/UL (ref 0–0.5)
EOSINOPHIL NFR BLD: 2.2 % (ref 0–8)
ERYTHROCYTE [DISTWIDTH] IN BLOOD BY AUTOMATED COUNT: 12.4 % (ref 11.5–14.5)
EST. GFR  (NO RACE VARIABLE): >60 ML/MIN/1.73 M^2
GLUCOSE SERPL-MCNC: 158 MG/DL (ref 70–110)
GLUCOSE UR QL STRIP: NEGATIVE
HCT VFR BLD AUTO: 37.4 % (ref 37–48.5)
HGB BLD-MCNC: 12.8 G/DL (ref 12–16)
HGB UR QL STRIP: ABNORMAL
HYALINE CASTS #/AREA URNS LPF: 0 /LPF
IMM GRANULOCYTES # BLD AUTO: 0.02 K/UL (ref 0–0.04)
IMM GRANULOCYTES NFR BLD AUTO: 0.3 % (ref 0–0.5)
KETONES UR QL STRIP: ABNORMAL
LEUKOCYTE ESTERASE UR QL STRIP: ABNORMAL
LYMPHOCYTES # BLD AUTO: 1.1 K/UL (ref 1–4.8)
LYMPHOCYTES NFR BLD: 15.2 % (ref 18–48)
MCH RBC QN AUTO: 32.3 PG (ref 27–31)
MCHC RBC AUTO-ENTMCNC: 34.2 G/DL (ref 32–36)
MCV RBC AUTO: 94 FL (ref 82–98)
MICROSCOPIC COMMENT: ABNORMAL
MONOCYTES # BLD AUTO: 0.4 K/UL (ref 0.3–1)
MONOCYTES NFR BLD: 6.1 % (ref 4–15)
NEUTROPHILS # BLD AUTO: 5.2 K/UL (ref 1.8–7.7)
NEUTROPHILS NFR BLD: 75.6 % (ref 38–73)
NITRITE UR QL STRIP: POSITIVE
NRBC BLD-RTO: 0 /100 WBC
PH UR STRIP: 7 [PH] (ref 5–8)
PLATELET # BLD AUTO: 252 K/UL (ref 150–450)
PMV BLD AUTO: 10.3 FL (ref 9.2–12.9)
POTASSIUM SERPL-SCNC: 3.6 MMOL/L (ref 3.5–5.1)
PROT SERPL-MCNC: 7 G/DL (ref 6–8.4)
PROT UR QL STRIP: ABNORMAL
RBC # BLD AUTO: 3.96 M/UL (ref 4–5.4)
RBC #/AREA URNS HPF: >100 /HPF (ref 0–4)
SODIUM SERPL-SCNC: 139 MMOL/L (ref 136–145)
SP GR UR STRIP: 1.02 (ref 1–1.03)
SQUAMOUS #/AREA URNS HPF: 4 /HPF
URN SPEC COLLECT METH UR: ABNORMAL
UROBILINOGEN UR STRIP-ACNC: 1 EU/DL
WBC # BLD AUTO: 6.92 K/UL (ref 3.9–12.7)
WBC #/AREA URNS HPF: >100 /HPF (ref 0–5)

## 2024-03-13 PROCEDURE — 81025 URINE PREGNANCY TEST: CPT | Performed by: INTERNAL MEDICINE

## 2024-03-13 PROCEDURE — 85025 COMPLETE CBC W/AUTO DIFF WBC: CPT | Performed by: NURSE PRACTITIONER

## 2024-03-13 PROCEDURE — 36415 COLL VENOUS BLD VENIPUNCTURE: CPT | Performed by: NURSE PRACTITIONER

## 2024-03-13 PROCEDURE — 87086 URINE CULTURE/COLONY COUNT: CPT | Performed by: INTERNAL MEDICINE

## 2024-03-13 PROCEDURE — 96361 HYDRATE IV INFUSION ADD-ON: CPT

## 2024-03-13 PROCEDURE — 99285 EMERGENCY DEPT VISIT HI MDM: CPT | Mod: 25

## 2024-03-13 PROCEDURE — 96375 TX/PRO/DX INJ NEW DRUG ADDON: CPT

## 2024-03-13 PROCEDURE — 81000 URINALYSIS NONAUTO W/SCOPE: CPT | Performed by: INTERNAL MEDICINE

## 2024-03-13 PROCEDURE — 63600175 PHARM REV CODE 636 W HCPCS: Performed by: CLINICAL NURSE SPECIALIST

## 2024-03-13 PROCEDURE — 96374 THER/PROPH/DIAG INJ IV PUSH: CPT

## 2024-03-13 PROCEDURE — 87077 CULTURE AEROBIC IDENTIFY: CPT | Performed by: INTERNAL MEDICINE

## 2024-03-13 PROCEDURE — 25000003 PHARM REV CODE 250: Performed by: NURSE PRACTITIONER

## 2024-03-13 PROCEDURE — 63600175 PHARM REV CODE 636 W HCPCS: Performed by: NURSE PRACTITIONER

## 2024-03-13 PROCEDURE — 25000003 PHARM REV CODE 250: Performed by: CLINICAL NURSE SPECIALIST

## 2024-03-13 PROCEDURE — 80053 COMPREHEN METABOLIC PANEL: CPT | Performed by: NURSE PRACTITIONER

## 2024-03-13 PROCEDURE — 87088 URINE BACTERIA CULTURE: CPT | Performed by: INTERNAL MEDICINE

## 2024-03-13 PROCEDURE — 87186 SC STD MICRODIL/AGAR DIL: CPT | Performed by: INTERNAL MEDICINE

## 2024-03-13 RX ORDER — HYDROMORPHONE HYDROCHLORIDE 1 MG/ML
1 INJECTION, SOLUTION INTRAMUSCULAR; INTRAVENOUS; SUBCUTANEOUS
Status: COMPLETED | OUTPATIENT
Start: 2024-03-13 | End: 2024-03-13

## 2024-03-13 RX ORDER — PHENAZOPYRIDINE HYDROCHLORIDE 200 MG/1
200 TABLET, FILM COATED ORAL 3 TIMES DAILY
Qty: 15 TABLET | Refills: 0 | Status: SHIPPED | OUTPATIENT
Start: 2024-03-13 | End: 2024-03-18

## 2024-03-13 RX ORDER — CEFDINIR 300 MG/1
300 CAPSULE ORAL 2 TIMES DAILY
Qty: 20 CAPSULE | Refills: 0 | Status: SHIPPED | OUTPATIENT
Start: 2024-03-13 | End: 2024-03-23

## 2024-03-13 RX ORDER — CIPROFLOXACIN 500 MG/1
500 TABLET ORAL ONCE
Status: COMPLETED | OUTPATIENT
Start: 2024-03-13 | End: 2024-03-13

## 2024-03-13 RX ORDER — HEPARIN 100 UNIT/ML
5 SYRINGE INTRAVENOUS ONCE
Status: COMPLETED | OUTPATIENT
Start: 2024-03-13 | End: 2024-03-13

## 2024-03-13 RX ORDER — ONDANSETRON HYDROCHLORIDE 2 MG/ML
4 INJECTION, SOLUTION INTRAVENOUS
Status: COMPLETED | OUTPATIENT
Start: 2024-03-13 | End: 2024-03-13

## 2024-03-13 RX ADMIN — CIPROFLOXACIN HYDROCHLORIDE 500 MG: 500 TABLET, FILM COATED ORAL at 07:03

## 2024-03-13 RX ADMIN — HYDROMORPHONE HYDROCHLORIDE 1 MG: 1 INJECTION, SOLUTION INTRAMUSCULAR; INTRAVENOUS; SUBCUTANEOUS at 06:03

## 2024-03-13 RX ADMIN — ONDANSETRON 4 MG: 2 INJECTION INTRAMUSCULAR; INTRAVENOUS at 06:03

## 2024-03-13 RX ADMIN — HEPARIN 500 UNITS: 100 SYRINGE at 07:03

## 2024-03-13 RX ADMIN — SODIUM CHLORIDE 1000 ML: 9 INJECTION, SOLUTION INTRAVENOUS at 06:03

## 2024-03-13 NOTE — Clinical Note
Brice Lyne accompanied their spouse to the emergency department on 3/13/2024. They may return to work on 03/15/2024.      If you have any questions or concerns, please don't hesitate to call.      Mignon QUIGLEY

## 2024-03-13 NOTE — ED PROVIDER NOTES
"Encounter Date: 3/13/2024       History     Chief Complaint   Patient presents with    Abdominal Pain     Pt stated that she has been experiencing lower abdominal pain / diaphoresis radiating from left sided back/flank pain. Dysuria with strong odor / burning urination. Denied fever / vomiting.      This is a 43-year-old white female with a history of multiple sclerosis, kidney stones, chronic pain, and hypertension who presents to the emergency department with complaints of abdominal pain that began 2 days ago.  Patient states that she has been "battling a UTI off and on for a month".  She reports urinary hesitancy, urgency, and feeling of incomplete emptying.  She has had a strong foul odor to her urine and burning upon urination.  Two days ago she developed left midback pain radiating into the left flank and left lower pelvic region, stating pain is relatively constant, varying in intensity.  She has also experienced nausea, excessive sweating, and generalized weakness as well.  She was recently on clindamycin for dental abscess.  She denies known fever, vomiting, change in bowel movements, or any other relative symptoms at this time.      Review of patient's allergies indicates:   Allergen Reactions    Metoclopramide hcl     Tramadol Anaphylaxis     Past Medical History:   Diagnosis Date    Depression     Hypertension     Multiple sclerosis      Past Surgical History:   Procedure Laterality Date     SECTION  2006    DILATION AND CURETTAGE OF UTERUS      LITHOTRIPSY      PORTACATH PLACEMENT Right 02/10/2020     Family History   Adopted: Yes     Social History     Tobacco Use    Smoking status: Former     Types: Vaping with nicotine    Smokeless tobacco: Former   Substance Use Topics    Alcohol use: Yes     Comment: occassionally    Drug use: Never     Review of Systems   Constitutional:  Positive for appetite change, diaphoresis and fatigue. Negative for fever.   Gastrointestinal:  Positive for " abdominal pain, constipation (chronic, no change) and nausea. Negative for diarrhea and vomiting.   Genitourinary:  Positive for difficulty urinating, dysuria, flank pain, frequency, pelvic pain and urgency.   Musculoskeletal:  Positive for back pain.   Neurological:  Positive for weakness.       Physical Exam     Initial Vitals [03/13/24 1653]   BP Pulse Resp Temp SpO2   (!) 143/94 84 20 98.1 °F (36.7 °C) 98 %      MAP       --         Physical Exam    Nursing note and vitals reviewed.  Constitutional: She appears well-developed and well-nourished. She is active. She appears distressed (pain).   HENT:   Head: Normocephalic and atraumatic.   Eyes: EOM are normal. Pupils are equal, round, and reactive to light.   Neck: Neck supple.   Normal range of motion.  Cardiovascular:  Normal rate, regular rhythm and normal heart sounds.           Pulmonary/Chest: Breath sounds normal. No respiratory distress.   Abdominal: Abdomen is soft. Bowel sounds are normal. She exhibits no distension. There is no abdominal tenderness (some generalized soreness to left lower).   Musculoskeletal:      Cervical back: Normal range of motion and neck supple.      Comments: Generalized muscle rigidity noted     Neurological: She is alert and oriented to person, place, and time. GCS score is 15. GCS eye subscore is 4. GCS verbal subscore is 5. GCS motor subscore is 6.   Skin: Skin is warm and dry. Capillary refill takes less than 2 seconds.   Psychiatric: She has a normal mood and affect. Her behavior is normal. Thought content normal.         ED Course   Procedures  Labs Reviewed   CULTURE, URINE - Abnormal; Notable for the following components:       Result Value    Urine Culture, Routine   (*)     Value: ESCHERICHIA COLI  >100,000 cfu/ml      All other components within normal limits    Narrative:     Preferred Collection Type->Urine, Clean Catch  Specimen Source->Urine   URINALYSIS, REFLEX TO URINE CULTURE - Abnormal; Notable for the  following components:    Appearance, UA Cloudy (*)     Protein, UA 3+ (*)     Ketones, UA Trace (*)     Bilirubin (UA) 1+ (*)     Occult Blood UA 3+ (*)     Nitrite, UA Positive (*)     Leukocytes, UA 3+ (*)     All other components within normal limits    Narrative:     Preferred Collection Type->Urine, Clean Catch  Specimen Source->Urine   CBC W/ AUTO DIFFERENTIAL - Abnormal; Notable for the following components:    RBC 3.96 (*)     MCH 32.3 (*)     Gran % 75.6 (*)     Lymph % 15.2 (*)     All other components within normal limits   COMPREHENSIVE METABOLIC PANEL - Abnormal; Notable for the following components:    Glucose 158 (*)     AST 72 (*)     ALT 97 (*)     All other components within normal limits   URINALYSIS MICROSCOPIC - Abnormal; Notable for the following components:    RBC, UA >100 (*)     WBC, UA >100 (*)     Bacteria Moderate (*)     All other components within normal limits    Narrative:     Preferred Collection Type->Urine, Clean Catch  Specimen Source->Urine   PREGNANCY TEST, URINE RAPID    Narrative:     For women of childbearing age w/o hysterectomy  Specimen Source->Urine          Imaging Results              CT Abdomen Pelvis  Without Contrast (Final result)  Result time 03/13/24 18:48:18      Final result by Gabe Ochoa MD (03/13/24 18:48:18)                   Impression:      1. Multiple left renal stones, measuring up to 1 cm in the pelvis, with intermittent UPJ obstruction possible.  No evidence of ureteral stone.  2. Severe hepatic steatosis.  3. Multi-cystic dysplastic right kidney      Electronically signed by: Gabe Ochoa MD  Date:    03/13/2024  Time:    18:48               Narrative:    EXAMINATION:  CT ABDOMEN PELVIS WITHOUT CONTRAST    CLINICAL HISTORY:  Flank pain, kidney stone suspected;    TECHNIQUE:  Iterative reconstruction technique was used.    CT/cardiac nuclear exam/s in prior 12 months:  3.    COMPARISON:  CT abdomen pelvis 10/16/2023, 05/15/2023,  2023.    FINDINGS:  Severely decreased hepatic attenuation consistent with steatosis.  Unremarkable noncontrast gallbladder, spleen, and pancreas.  Multi cystic dysplastic right kidney.  Multiple stones left kidney including stones measuring 7 mm at the mid and lower pole.  1 cm stone within the left renal pelvis.  No evidence of ureteral stone.  No left hydronephrosis.  No bowel obstruction.  Normal appendix.  No free fluid.  Small fat containing umbilical hernia.                                       Medications   sodium chloride 0.9% bolus 1,000 mL 1,000 mL (0 mLs Intravenous Stopped 3/13/24 1930)   HYDROmorphone injection 1 mg (1 mg Intravenous Given 3/13/24 1826)   ondansetron injection 4 mg (4 mg Intravenous Given 3/13/24 1823)   heparin, porcine (PF) 100 unit/mL injection flush 500 Units (500 Units Intravenous Given 3/13/24 1939)   ciprofloxacin HCl tablet 500 mg (500 mg Oral Given 3/13/24 1938)     Medical Decision Making  Amount and/or Complexity of Data Reviewed  Labs: ordered.  Radiology: ordered.    Risk  Prescription drug management.                                      Clinical Impression:  Final diagnoses:  [N39.0, R31.9] Urinary tract infection with hematuria, site unspecified (Primary)          ED Disposition Condition    Discharge Stable          ED Prescriptions       Medication Sig Dispense Start Date End Date Auth. Provider    cefdinir (OMNICEF) 300 MG capsule Take 1 capsule (300 mg total) by mouth 2 (two) times daily. for 10 days 20 capsule 3/13/2024 3/23/2024 Lesley Hernandez, NP    phenazopyridine (PYRIDIUM) 200 MG tablet () Take 1 tablet (200 mg total) by mouth 3 (three) times daily. for 5 days 15 tablet 3/13/2024 3/18/2024 Lesley Hernandez NP          Follow-up Information       Follow up With Specialties Details Why Contact Info    Rula Betancourt MD Internal Medicine  As needed [address]  [facility] [address]  [address]  [facility] [address] [phone]                Renee Martinez, KAYLA  03/20/24 0806

## 2024-03-14 NOTE — ED NOTES
Port A Cath deacessed and flushed with heparin flush. Bandaid applied to insertion site. Pt tolerated well.

## 2024-03-16 LAB — BACTERIA UR CULT: ABNORMAL

## 2024-04-03 ENCOUNTER — HOSPITAL ENCOUNTER (EMERGENCY)
Facility: HOSPITAL | Age: 44
Discharge: HOME | End: 2024-04-04
Attending: EMERGENCY MEDICINE
Payer: MEDICAID

## 2024-04-03 DIAGNOSIS — N39.0 URINARY TRACT INFECTION WITH HEMATURIA, SITE UNSPECIFIED: Primary | ICD-10-CM

## 2024-04-03 DIAGNOSIS — Z87.442 HISTORY OF KIDNEY STONES: ICD-10-CM

## 2024-04-03 DIAGNOSIS — R10.9 LEFT FLANK PAIN: ICD-10-CM

## 2024-04-03 DIAGNOSIS — R31.9 URINARY TRACT INFECTION WITH HEMATURIA, SITE UNSPECIFIED: Primary | ICD-10-CM

## 2024-04-03 LAB
B-HCG UR QL: NEGATIVE
BACTERIA #/AREA URNS HPF: ABNORMAL /HPF
BILIRUB UR QL STRIP: NEGATIVE
CLARITY UR: ABNORMAL
COLOR UR: YELLOW
GLUCOSE UR QL STRIP: NEGATIVE
HGB UR QL STRIP: ABNORMAL
HYALINE CASTS #/AREA URNS LPF: 2.5 /LPF
KETONES UR QL STRIP: NEGATIVE
LEUKOCYTE ESTERASE UR QL STRIP: ABNORMAL
MICROSCOPIC COMMENT: ABNORMAL
NITRITE UR QL STRIP: POSITIVE
PH UR STRIP: 6 [PH] (ref 5–8)
PROT UR QL STRIP: ABNORMAL
RBC #/AREA URNS HPF: >100 /HPF (ref 0–4)
SP GR UR STRIP: 1.02 (ref 1–1.03)
SQUAMOUS #/AREA URNS HPF: 2 /HPF
URN SPEC COLLECT METH UR: ABNORMAL
UROBILINOGEN UR STRIP-ACNC: 1 EU/DL
WBC #/AREA URNS HPF: >100 /HPF (ref 0–5)

## 2024-04-03 PROCEDURE — 87088 URINE BACTERIA CULTURE: CPT | Performed by: NURSE PRACTITIONER

## 2024-04-03 PROCEDURE — 99284 EMERGENCY DEPT VISIT MOD MDM: CPT | Mod: 25

## 2024-04-03 PROCEDURE — 81000 URINALYSIS NONAUTO W/SCOPE: CPT | Performed by: NURSE PRACTITIONER

## 2024-04-03 PROCEDURE — 25000003 PHARM REV CODE 250: Performed by: NURSE PRACTITIONER

## 2024-04-03 PROCEDURE — 87086 URINE CULTURE/COLONY COUNT: CPT | Performed by: NURSE PRACTITIONER

## 2024-04-03 PROCEDURE — 87186 SC STD MICRODIL/AGAR DIL: CPT | Performed by: NURSE PRACTITIONER

## 2024-04-03 PROCEDURE — 96361 HYDRATE IV INFUSION ADD-ON: CPT

## 2024-04-03 PROCEDURE — 87077 CULTURE AEROBIC IDENTIFY: CPT | Performed by: NURSE PRACTITIONER

## 2024-04-03 PROCEDURE — 96375 TX/PRO/DX INJ NEW DRUG ADDON: CPT

## 2024-04-03 PROCEDURE — 96365 THER/PROPH/DIAG IV INF INIT: CPT

## 2024-04-03 PROCEDURE — 81025 URINE PREGNANCY TEST: CPT | Performed by: NURSE PRACTITIONER

## 2024-04-03 PROCEDURE — 63600175 PHARM REV CODE 636 W HCPCS: Performed by: NURSE PRACTITIONER

## 2024-04-03 RX ORDER — HEPARIN 100 UNIT/ML
5 SYRINGE INTRAVENOUS
Status: DISCONTINUED | OUTPATIENT
Start: 2024-04-03 | End: 2024-04-04 | Stop reason: HOSPADM

## 2024-04-03 RX ORDER — PHENAZOPYRIDINE HYDROCHLORIDE 200 MG/1
200 TABLET, FILM COATED ORAL 3 TIMES DAILY
Qty: 6 TABLET | Refills: 0 | Status: SHIPPED | OUTPATIENT
Start: 2024-04-03 | End: 2024-04-05

## 2024-04-03 RX ORDER — TAMSULOSIN HYDROCHLORIDE 0.4 MG/1
0.4 CAPSULE ORAL DAILY
Qty: 14 CAPSULE | Refills: 0 | Status: SHIPPED | OUTPATIENT
Start: 2024-04-03 | End: 2024-05-13 | Stop reason: SDUPTHER

## 2024-04-03 RX ORDER — ONDANSETRON HYDROCHLORIDE 2 MG/ML
4 INJECTION, SOLUTION INTRAVENOUS
Status: COMPLETED | OUTPATIENT
Start: 2024-04-03 | End: 2024-04-03

## 2024-04-03 RX ORDER — HYDROMORPHONE HYDROCHLORIDE 1 MG/ML
1 INJECTION, SOLUTION INTRAMUSCULAR; INTRAVENOUS; SUBCUTANEOUS
Status: COMPLETED | OUTPATIENT
Start: 2024-04-03 | End: 2024-04-03

## 2024-04-03 RX ORDER — TAMSULOSIN HYDROCHLORIDE 0.4 MG/1
0.4 CAPSULE ORAL
Status: COMPLETED | OUTPATIENT
Start: 2024-04-03 | End: 2024-04-03

## 2024-04-03 RX ORDER — SULFAMETHOXAZOLE AND TRIMETHOPRIM 800; 160 MG/1; MG/1
1 TABLET ORAL 2 TIMES DAILY
Qty: 20 TABLET | Refills: 0 | Status: SHIPPED | OUTPATIENT
Start: 2024-04-03 | End: 2024-04-13

## 2024-04-03 RX ADMIN — TAMSULOSIN HYDROCHLORIDE 0.4 MG: 0.4 CAPSULE ORAL at 10:04

## 2024-04-03 RX ADMIN — ONDANSETRON 4 MG: 2 INJECTION INTRAMUSCULAR; INTRAVENOUS at 10:04

## 2024-04-03 RX ADMIN — CEFTRIAXONE 1 G: 1 INJECTION, POWDER, FOR SOLUTION INTRAMUSCULAR; INTRAVENOUS at 10:04

## 2024-04-03 RX ADMIN — HYDROMORPHONE HYDROCHLORIDE 1 MG: 1 INJECTION, SOLUTION INTRAMUSCULAR; INTRAVENOUS; SUBCUTANEOUS at 10:04

## 2024-04-03 RX ADMIN — SODIUM CHLORIDE 1000 ML: 9 INJECTION, SOLUTION INTRAVENOUS at 10:04

## 2024-04-03 NOTE — Clinical Note
Brice Reese accompanied their spouse to the emergency department on 4/3/2024. They may return to work on 04/04/2024.      If you have any questions or concerns, please don't hesitate to call.      Tess QUIGLEY

## 2024-04-03 NOTE — Clinical Note
Brice Reese accompanied their spouse to the emergency department on 4/3/2024. They may return to work on 04/05/2024.      If you have any questions or concerns, please don't hesitate to call.      Tess QUIGLEY

## 2024-04-04 VITALS
WEIGHT: 140 LBS | HEIGHT: 60 IN | OXYGEN SATURATION: 98 % | TEMPERATURE: 98 F | SYSTOLIC BLOOD PRESSURE: 111 MMHG | RESPIRATION RATE: 16 BRPM | DIASTOLIC BLOOD PRESSURE: 74 MMHG | HEART RATE: 71 BPM | BODY MASS INDEX: 27.48 KG/M2

## 2024-04-04 PROCEDURE — 63600175 PHARM REV CODE 636 W HCPCS: Performed by: EMERGENCY MEDICINE

## 2024-04-04 PROCEDURE — 96375 TX/PRO/DX INJ NEW DRUG ADDON: CPT

## 2024-04-04 RX ADMIN — HEPARIN 500 UNITS: 100 SYRINGE at 12:04

## 2024-04-04 NOTE — DISCHARGE INSTRUCTIONS
Be sure to complete all antibiotics.  Drink plenty of fluids and empty your bladder frequently.  Plan to follow-up with your urologist for further evaluation and management of your recurring urinary tract infections.  Return to the emergency department for worsening condition.

## 2024-04-04 NOTE — ED PROVIDER NOTES
Encounter Date: 4/3/2024       History     Chief Complaint   Patient presents with    Flank Pain     Referred from urgent care with Dx of UTI and possible kidney stones. Finished antibiotics 2 weeks ago.      This is a 43-year-old white female with a history of multiple sclerosis, kidney stones, chronic pain, and frequent UTIs who presents to the emergency department with complaints of left midback pain radiating into the left flank and left lower pelvic region, stating pain is relatively constant, varying in intensity. She reports urinary hesitancy, urgency, and feeling of incomplete emptying.  She has had a strong foul odor to her urine and burning upon urination.  She has also experienced nausea, excessive sweating, and generalized weakness as well.  The patient was evaluated here in the emergency department 1 month ago for similar symptoms, completed cefdinir and had complete resolution of symptoms. She denies known fever, vomiting, change in bowel movements, or any other relative symptoms at this time.      Review of patient's allergies indicates:   Allergen Reactions    Metoclopramide hcl     Tramadol Anaphylaxis     Past Medical History:   Diagnosis Date    Depression     Hypertension     Multiple sclerosis      Past Surgical History:   Procedure Laterality Date     SECTION  2006    DILATION AND CURETTAGE OF UTERUS      LITHOTRIPSY      PORTACATH PLACEMENT Right 02/10/2020     Family History   Adopted: Yes     Social History     Tobacco Use    Smoking status: Former     Types: Vaping with nicotine    Smokeless tobacco: Former   Substance Use Topics    Alcohol use: Yes     Comment: occassionally    Drug use: Never     Review of Systems   Constitutional:  Positive for appetite change. Negative for fever.   Gastrointestinal:  Positive for nausea. Negative for vomiting.   Genitourinary:  Positive for difficulty urinating, dysuria, flank pain and urgency.   Musculoskeletal:  Positive for back pain.    Neurological:  Positive for weakness.       Physical Exam     Initial Vitals [04/03/24 1913]   BP Pulse Resp Temp SpO2   108/71 69 18 98.4 °F (36.9 °C) 97 %      MAP       --         Physical Exam    Nursing note and vitals reviewed.  Constitutional: She appears well-developed and well-nourished. She is active. No distress.   HENT:   Head: Normocephalic and atraumatic.   Eyes: EOM are normal. Pupils are equal, round, and reactive to light.   Neck: Neck supple.   Normal range of motion.  Cardiovascular:  Normal rate, regular rhythm and normal heart sounds.           Pulmonary/Chest: Breath sounds normal. No respiratory distress.   Abdominal: Abdomen is soft. Bowel sounds are normal. She exhibits no distension. There is no abdominal tenderness.   Musculoskeletal:         General: Normal range of motion.      Cervical back: Normal range of motion and neck supple.     Neurological: She is alert and oriented to person, place, and time. GCS score is 15. GCS eye subscore is 4. GCS verbal subscore is 5. GCS motor subscore is 6.   Skin: Skin is warm and dry. Capillary refill takes less than 2 seconds.   Psychiatric: She has a normal mood and affect. Her behavior is normal. Thought content normal.         ED Course   Procedures  Labs Reviewed   CULTURE, URINE - Abnormal; Notable for the following components:       Result Value    Urine Culture, Routine   (*)     Value: ESCHERICHIA COLI  >100,000 cfu/ml  No other significant isolate      All other components within normal limits    Narrative:     Preferred Collection Type->Urine, Clean Catch  Specimen Source->Urine   URINALYSIS, REFLEX TO URINE CULTURE - Abnormal; Notable for the following components:    Appearance, UA Cloudy (*)     Protein, UA 2+ (*)     Occult Blood UA 3+ (*)     Nitrite, UA Positive (*)     Leukocytes, UA 2+ (*)     All other components within normal limits    Narrative:     Preferred Collection Type->Urine, Clean Catch  Specimen Source->Urine    URINALYSIS MICROSCOPIC - Abnormal; Notable for the following components:    RBC, UA >100 (*)     WBC, UA >100 (*)     Bacteria Moderate (*)     Hyaline Casts, UA 2.5 (*)     All other components within normal limits    Narrative:     Preferred Collection Type->Urine, Clean Catch  Specimen Source->Urine   PREGNANCY TEST, URINE RAPID    Narrative:     Specimen Source->Urine          Imaging Results    None          Medications   HYDROmorphone injection 1 mg (1 mg Intravenous Given 4/3/24 2221)   ondansetron injection 4 mg (4 mg Intravenous Given 4/3/24 2221)   tamsulosin 24 hr capsule 0.4 mg (0.4 mg Oral Given 4/3/24 2219)   sodium chloride 0.9% bolus 1,000 mL 1,000 mL ( Intravenous Stopped 4/3/24 2352)   cefTRIAXone (Rocephin) 1 g in dextrose 5 % in water (D5W) 100 mL IVPB (MB+) (0 g Intravenous Stopped 4/3/24 2319)     Medical Decision Making  Amount and/or Complexity of Data Reviewed  Labs: ordered.    Risk  Prescription drug management.               ED Course as of 04/08/24 1034   Wed Apr 03, 2024 2207  43-year-old white female with a history of MS and recurrent UTIs / kidney stones presents to ED for dysuria, flank pain for 2 days.  Patient was evaluated here in the emergency department 1 month ago for similar symptoms, CT abdomen pelvis did reveal some renal stones in the left, however no obvious ureteral stone.  Patient completed antibiotics with complete resolution of symptoms.  Given recent CT scan results will plan to cover for likely ureterolithiasis and avoid repeat CT scan if serum creatinine is stable/at baseline. Labs pending. U/A reveals >100 rbc's and wbc's in the presence of bacteria and nitrites. Will cover with Bactrim (given recent culture results) for UTI, culture pending.  [CB]   2324 Patient refusing further sticks. Will d/c labs and dispo home per previous plans. Counseled to return if worse in any way.  [MS]      ED Course User Index  [CB] Renee Martinez NP  [MS] Reid Phillips  MD CLARENCE                           Clinical Impression:  Final diagnoses:  [N39.0, R31.9] Urinary tract infection with hematuria, site unspecified (Primary)  [R10.9] Left flank pain  [Z87.442] History of kidney stones          ED Disposition Condition    Discharge Stable          ED Prescriptions       Medication Sig Dispense Start Date End Date Auth. Provider    tamsulosin (FLOMAX) 0.4 mg Cap Take 1 capsule (0.4 mg total) by mouth once daily. for 14 days 14 capsule 4/3/2024 2024 Renee Martinez NP    sulfamethoxazole-trimethoprim 800-160mg (BACTRIM DS) 800-160 mg Tab Take 1 tablet by mouth 2 (two) times daily. for 10 days 20 tablet 4/3/2024 2024 Renee Martinez NP    phenazopyridine (PYRIDIUM) 200 MG tablet () Take 1 tablet (200 mg total) by mouth 3 (three) times daily. for 2 days 6 tablet 4/3/2024 2024 Renee Martinez NP          Follow-up Information       Follow up With Specialties Details Why Contact Info    Your Urologist  Call in 1 day for re-evaluation of today's complaint              Renee Martinez NP  [phone]

## 2024-04-07 LAB — BACTERIA UR CULT: ABNORMAL

## 2024-04-22 RX ORDER — VERAPAMIL HYDROCHLORIDE 120 MG/1
120 TABLET, FILM COATED, EXTENDED RELEASE ORAL NIGHTLY
Qty: 28 TABLET | Refills: 4 | Status: SHIPPED | OUTPATIENT
Start: 2024-04-22

## 2024-04-24 ENCOUNTER — HOSPITAL ENCOUNTER (EMERGENCY)
Facility: HOSPITAL | Age: 44
Discharge: HOME | End: 2024-04-24
Attending: EMERGENCY MEDICINE
Payer: MEDICAID

## 2024-04-24 ENCOUNTER — HOSPITAL ENCOUNTER (OUTPATIENT)
Dept: PULMONOLOGY | Facility: HOSPITAL | Age: 44
Discharge: HOME | End: 2024-04-24
Attending: EMERGENCY MEDICINE
Payer: MEDICAID

## 2024-04-24 DIAGNOSIS — G35 MULTIPLE SCLEROSIS: ICD-10-CM

## 2024-04-24 DIAGNOSIS — Z13.6 SCREENING FOR ISCHEMIC HEART DISEASE: Primary | ICD-10-CM

## 2024-04-24 DIAGNOSIS — Z79.899 ENCOUNTER FOR LONG-TERM (CURRENT) USE OF OTHER MEDICATIONS: Primary | ICD-10-CM

## 2024-04-24 DIAGNOSIS — Z13.6 SCREENING FOR ISCHEMIC HEART DISEASE: ICD-10-CM

## 2024-04-24 PROCEDURE — 93005 ELECTROCARDIOGRAM TRACING: CPT

## 2024-04-24 PROCEDURE — 99999 HC NO LEVEL OF SERVICE - ED ONLY: CPT

## 2024-04-24 PROCEDURE — 63600175 PHARM REV CODE 636 W HCPCS

## 2024-04-24 PROCEDURE — 93010 ELECTROCARDIOGRAM REPORT: CPT | Mod: ,,, | Performed by: INTERNAL MEDICINE

## 2024-04-24 RX ORDER — HEPARIN 100 UNIT/ML
3 SYRINGE INTRAVENOUS ONCE
Status: COMPLETED | OUTPATIENT
Start: 2024-04-24 | End: 2024-04-24

## 2024-04-24 RX ORDER — HEPARIN 100 UNIT/ML
SYRINGE INTRAVENOUS
Status: COMPLETED
Start: 2024-04-24 | End: 2024-04-24

## 2024-04-24 RX ADMIN — HEPARIN 3 ML: 100 SYRINGE at 06:04

## 2024-04-24 RX ADMIN — HEPARIN SODIUM (PORCINE) LOCK FLUSH IV SOLN 100 UNIT/ML 3 ML: 100 SOLUTION at 06:04

## 2024-04-24 NOTE — NURSING
"Patient arrived for labs and was checked into the ER instead of OP lab. Patient sent to Lab for stick. Patient states "I am a hard stick and that is why I have a port". Patient would only let nurse access and de-access port for labs. DANN Knapp from ED obtained and flushed port. Heparin flush over ridden and given after lab draw.   "

## 2024-04-25 LAB
OHS QRS DURATION: 96 MS
OHS QTC CALCULATION: 415 MS

## 2024-04-25 NOTE — ED PROVIDER NOTES
Patient was incorrectly checked into ER. I was not involved in her care.      Joseph New MD  04/25/24 0621

## 2024-05-02 ENCOUNTER — PATIENT MESSAGE (OUTPATIENT)
Dept: PSYCHIATRY | Facility: CLINIC | Age: 44
End: 2024-05-02
Payer: MEDICAID

## 2024-05-13 ENCOUNTER — HOSPITAL ENCOUNTER (EMERGENCY)
Facility: HOSPITAL | Age: 44
Discharge: HOME | End: 2024-05-13
Attending: EMERGENCY MEDICINE
Payer: MEDICAID

## 2024-05-13 VITALS
HEART RATE: 81 BPM | RESPIRATION RATE: 16 BRPM | TEMPERATURE: 98 F | HEIGHT: 60 IN | SYSTOLIC BLOOD PRESSURE: 218 MMHG | DIASTOLIC BLOOD PRESSURE: 120 MMHG | OXYGEN SATURATION: 95 % | WEIGHT: 140 LBS | BODY MASS INDEX: 27.48 KG/M2

## 2024-05-13 DIAGNOSIS — R31.9 URINARY TRACT INFECTION WITH HEMATURIA, SITE UNSPECIFIED: Primary | ICD-10-CM

## 2024-05-13 DIAGNOSIS — N39.0 URINARY TRACT INFECTION WITH HEMATURIA, SITE UNSPECIFIED: Primary | ICD-10-CM

## 2024-05-13 LAB
BACTERIA #/AREA URNS HPF: ABNORMAL /HPF
BILIRUB UR QL STRIP: NEGATIVE
CAOX CRY URNS QL MICRO: ABNORMAL
CLARITY UR: ABNORMAL
COLOR UR: YELLOW
GLUCOSE UR QL STRIP: NEGATIVE
HGB UR QL STRIP: ABNORMAL
HYALINE CASTS #/AREA URNS LPF: 4 /LPF
KETONES UR QL STRIP: NEGATIVE
LEUKOCYTE ESTERASE UR QL STRIP: ABNORMAL
MICROSCOPIC COMMENT: ABNORMAL
NITRITE UR QL STRIP: POSITIVE
PH UR STRIP: 6 [PH] (ref 5–8)
PROT UR QL STRIP: ABNORMAL
RBC #/AREA URNS HPF: >100 /HPF (ref 0–4)
SP GR UR STRIP: 1.02 (ref 1–1.03)
SQUAMOUS #/AREA URNS HPF: 3 /HPF
URN SPEC COLLECT METH UR: ABNORMAL
UROBILINOGEN UR STRIP-ACNC: 1 EU/DL
WBC #/AREA URNS HPF: >100 /HPF (ref 0–5)

## 2024-05-13 PROCEDURE — 87086 URINE CULTURE/COLONY COUNT: CPT | Performed by: CLINICAL NURSE SPECIALIST

## 2024-05-13 PROCEDURE — 96375 TX/PRO/DX INJ NEW DRUG ADDON: CPT

## 2024-05-13 PROCEDURE — 87088 URINE BACTERIA CULTURE: CPT | Performed by: CLINICAL NURSE SPECIALIST

## 2024-05-13 PROCEDURE — 87077 CULTURE AEROBIC IDENTIFY: CPT | Performed by: CLINICAL NURSE SPECIALIST

## 2024-05-13 PROCEDURE — 87186 SC STD MICRODIL/AGAR DIL: CPT | Performed by: CLINICAL NURSE SPECIALIST

## 2024-05-13 PROCEDURE — 96365 THER/PROPH/DIAG IV INF INIT: CPT

## 2024-05-13 PROCEDURE — 63600175 PHARM REV CODE 636 W HCPCS: Performed by: CLINICAL NURSE SPECIALIST

## 2024-05-13 PROCEDURE — 99284 EMERGENCY DEPT VISIT MOD MDM: CPT | Mod: 25

## 2024-05-13 PROCEDURE — 81000 URINALYSIS NONAUTO W/SCOPE: CPT | Performed by: CLINICAL NURSE SPECIALIST

## 2024-05-13 PROCEDURE — 25000003 PHARM REV CODE 250: Performed by: CLINICAL NURSE SPECIALIST

## 2024-05-13 RX ORDER — PHENAZOPYRIDINE HYDROCHLORIDE 200 MG/1
200 TABLET, FILM COATED ORAL 3 TIMES DAILY
Qty: 15 TABLET | Refills: 0 | Status: SHIPPED | OUTPATIENT
Start: 2024-05-13 | End: 2024-05-18

## 2024-05-13 RX ORDER — HEPARIN SODIUM 5000 [USP'U]/ML
5000 INJECTION, SOLUTION INTRAVENOUS; SUBCUTANEOUS
Status: COMPLETED | OUTPATIENT
Start: 2024-05-13 | End: 2024-05-13

## 2024-05-13 RX ORDER — HYDROMORPHONE HYDROCHLORIDE 1 MG/ML
1 INJECTION, SOLUTION INTRAMUSCULAR; INTRAVENOUS; SUBCUTANEOUS ONCE
Status: COMPLETED | OUTPATIENT
Start: 2024-05-13 | End: 2024-05-13

## 2024-05-13 RX ORDER — TAMSULOSIN HYDROCHLORIDE 0.4 MG/1
0.4 CAPSULE ORAL DAILY
Qty: 14 CAPSULE | Refills: 0 | Status: SHIPPED | OUTPATIENT
Start: 2024-05-13 | End: 2024-05-27

## 2024-05-13 RX ORDER — HYDROMORPHONE HYDROCHLORIDE 1 MG/ML
1 INJECTION, SOLUTION INTRAMUSCULAR; INTRAVENOUS; SUBCUTANEOUS ONCE
Status: DISCONTINUED | OUTPATIENT
Start: 2024-05-13 | End: 2024-05-13

## 2024-05-13 RX ORDER — PHENAZOPYRIDINE HYDROCHLORIDE 100 MG/1
200 TABLET, FILM COATED ORAL
Status: COMPLETED | OUTPATIENT
Start: 2024-05-13 | End: 2024-05-13

## 2024-05-13 RX ORDER — SULFAMETHOXAZOLE AND TRIMETHOPRIM 800; 160 MG/1; MG/1
1 TABLET ORAL 2 TIMES DAILY
Qty: 14 TABLET | Refills: 0 | Status: SHIPPED | OUTPATIENT
Start: 2024-05-13 | End: 2024-05-20

## 2024-05-13 RX ORDER — HYDRALAZINE HYDROCHLORIDE 25 MG/1
50 TABLET, FILM COATED ORAL ONCE
Status: COMPLETED | OUTPATIENT
Start: 2024-05-13 | End: 2024-05-13

## 2024-05-13 RX ADMIN — CEFTRIAXONE 1 G: 1 INJECTION, POWDER, FOR SOLUTION INTRAMUSCULAR; INTRAVENOUS at 05:05

## 2024-05-13 RX ADMIN — PHENAZOPYRIDINE 200 MG: 100 TABLET ORAL at 05:05

## 2024-05-13 RX ADMIN — HYDROMORPHONE HYDROCHLORIDE 1 MG: 1 INJECTION, SOLUTION INTRAMUSCULAR; INTRAVENOUS; SUBCUTANEOUS at 05:05

## 2024-05-13 RX ADMIN — HYDRALAZINE HYDROCHLORIDE 50 MG: 25 TABLET ORAL at 06:05

## 2024-05-13 RX ADMIN — HEPARIN SODIUM 5000 UNITS: 5000 INJECTION INTRAVENOUS; SUBCUTANEOUS at 06:05

## 2024-05-13 NOTE — ED PROVIDER NOTES
"Encounter Date: 2024       History   No chief complaint on file.    HPI  Review of patient's allergies indicates:   Allergen Reactions    Metoclopramide hcl     Tramadol Anaphylaxis     Past Medical History:   Diagnosis Date    Depression     Hypertension     Multiple sclerosis      Past Surgical History:   Procedure Laterality Date     SECTION  2006    DILATION AND CURETTAGE OF UTERUS      LITHOTRIPSY      PORTACATH PLACEMENT Right 02/10/2020     Family History   Adopted: Yes     Social History     Tobacco Use    Smoking status: Former     Types: Vaping with nicotine    Smokeless tobacco: Former   Substance Use Topics    Alcohol use: Yes     Comment: occassionally    Drug use: Never     Review of Systems    Physical Exam     Initial Vitals   BP Pulse Resp Temp SpO2   -- -- -- -- --      MAP       --         Physical Exam    ED Course   Procedures  Labs Reviewed - No data to display       Imaging Results    None          Medications - No data to display  Medical Decision Making  Risk  Prescription drug management.                                      Clinical Impression:   ***Please document a Clinical Impression and click the "Refresh" button to refresh your note and automatically pull in before signing.***           "

## 2024-05-13 NOTE — ED PROVIDER NOTES
Encounter Date: 2024       History     Chief Complaint   Patient presents with    Dysuria     PT reports pain urination, abd pressure, urine incontinence and a strong odor that started 3 days ago.      43-year-old female presents emergency room with painful urination, abdominal pressure, urinary incontinence and strong urine smell for the last 3 days.  History of MS and wheelchair bound.        Review of patient's allergies indicates:   Allergen Reactions    Metoclopramide hcl     Tramadol Anaphylaxis     Past Medical History:   Diagnosis Date    Depression     Hypertension     Multiple sclerosis      Past Surgical History:   Procedure Laterality Date     SECTION  2006    DILATION AND CURETTAGE OF UTERUS      LITHOTRIPSY      PORTACATH PLACEMENT Right 02/10/2020     Family History   Adopted: Yes     Social History     Tobacco Use    Smoking status: Former     Types: Vaping with nicotine    Smokeless tobacco: Former   Substance Use Topics    Alcohol use: Yes     Comment: occassionally    Drug use: Never     Review of Systems   Constitutional:  Negative for fever.   HENT:  Negative for sore throat.    Respiratory:  Negative for shortness of breath.    Cardiovascular:  Negative for chest pain.   Gastrointestinal:  Positive for abdominal pain. Negative for nausea.   Genitourinary:  Positive for dysuria.   Musculoskeletal:  Negative for back pain.   Skin:  Negative for rash.   Neurological:  Negative for weakness.   Hematological:  Does not bruise/bleed easily.   All other systems reviewed and are negative.      Physical Exam     Initial Vitals [24 1600]   BP Pulse Resp Temp SpO2   (!) 171/112 76 18 97.9 °F (36.6 °C) 98 %      MAP       --         Physical Exam    Nursing note and vitals reviewed.  Constitutional: She appears well-developed and well-nourished.   HENT:   Head: Normocephalic and atraumatic.   Eyes: Pupils are equal, round, and reactive to light.   Neck:   Normal range of  motion.  Musculoskeletal:         General: Normal range of motion.      Cervical back: Normal range of motion.     Neurological: She is alert and oriented to person, place, and time.   Skin: Skin is warm and dry.   Psychiatric: She has a normal mood and affect.         ED Course   Procedures  Labs Reviewed   URINALYSIS, REFLEX TO URINE CULTURE - Abnormal; Notable for the following components:       Result Value    Appearance, UA Cloudy (*)     Protein, UA 2+ (*)     Occult Blood UA 3+ (*)     Nitrite, UA Positive (*)     Leukocytes, UA 3+ (*)     All other components within normal limits    Narrative:     Preferred Collection Type->Urine, Clean Catch  Specimen Source->Urine   URINALYSIS MICROSCOPIC - Abnormal; Notable for the following components:    RBC, UA >100 (*)     WBC, UA >100 (*)     Bacteria Many (*)     Hyaline Casts, UA 4.0 (*)     All other components within normal limits    Narrative:     Preferred Collection Type->Urine, Clean Catch  Specimen Source->Urine   CULTURE, URINE          Imaging Results    None          Medications   cefTRIAXone (Rocephin) 1 g in dextrose 5 % in water (D5W) 100 mL IVPB (MB+) (0 g Intravenous Stopped 5/13/24 1821)   phenazopyridine tablet 200 mg (200 mg Oral Given 5/13/24 1748)   HYDROmorphone injection 1 mg (1 mg Intravenous Given 5/13/24 1755)   heparin (porcine) injection 5,000 Units (5,000 Units Intravenous Given 5/13/24 1827)   hydrALAZINE tablet 50 mg (50 mg Oral Given 5/13/24 1844)     Medical Decision Making  Risk  Prescription drug management.                                      Clinical Impression:  Final diagnoses:  [N39.0, R31.9] Urinary tract infection with hematuria, site unspecified (Primary)          ED Disposition Condition    Discharge Stable          ED Prescriptions       Medication Sig Dispense Start Date End Date Auth. Provider    sulfamethoxazole-trimethoprim 800-160mg (BACTRIM DS) 800-160 mg Tab Take 1 tablet by mouth 2 (two) times daily. for 7 days  14 tablet 5/13/2024 5/20/2024 Lesley Hernandez NP    tamsulosin (FLOMAX) 0.4 mg Cap Take 1 capsule (0.4 mg total) by mouth once daily. for 14 days 14 capsule 5/13/2024 5/27/2024 Lesley Hernandez NP    phenazopyridine (PYRIDIUM) 200 MG tablet Take 1 tablet (200 mg total) by mouth 3 (three) times daily. for 5 days 15 tablet 5/13/2024 5/18/2024 Lesley Hernandez NP          Follow-up Information       Follow up With Specialties Details Why Contact Info    Rula Betancourt MD Internal Medicine  As needed [id number]  [facility] [address]  [address]  [facility] [address] [phone]               Lesley Hernandez NP  05/13/24 1940

## 2024-05-13 NOTE — Clinical Note
rBice Hugh accompanied their spouse to the emergency department on 5/13/2024. They may return to work on 05/14/2024.      If you have any questions or concerns, please don't hesitate to call.      Teresita QUIGLEY

## 2024-05-16 LAB — BACTERIA UR CULT: ABNORMAL

## 2024-07-09 ENCOUNTER — PATIENT MESSAGE (OUTPATIENT)
Dept: ADMINISTRATIVE | Facility: HOSPITAL | Age: 44
End: 2024-07-09

## 2024-07-20 ENCOUNTER — HOSPITAL ENCOUNTER (EMERGENCY)
Facility: HOSPITAL | Age: 44
Discharge: HOME | End: 2024-07-20
Attending: EMERGENCY MEDICINE
Payer: MEDICAID

## 2024-07-20 VITALS
RESPIRATION RATE: 16 BRPM | BODY MASS INDEX: 31.41 KG/M2 | DIASTOLIC BLOOD PRESSURE: 99 MMHG | HEART RATE: 73 BPM | SYSTOLIC BLOOD PRESSURE: 145 MMHG | HEIGHT: 60 IN | TEMPERATURE: 99 F | WEIGHT: 160 LBS | OXYGEN SATURATION: 98 %

## 2024-07-20 DIAGNOSIS — E87.6 HYPOKALEMIA: Primary | ICD-10-CM

## 2024-07-20 DIAGNOSIS — N39.0 URINARY TRACT INFECTION WITHOUT HEMATURIA, SITE UNSPECIFIED: ICD-10-CM

## 2024-07-20 LAB
ALBUMIN SERPL BCP-MCNC: 3 G/DL (ref 3.5–5.2)
ALP SERPL-CCNC: 127 U/L (ref 55–135)
ALT SERPL W/O P-5'-P-CCNC: 65 U/L (ref 10–44)
AMPHET+METHAMPHET UR QL: NEGATIVE
ANION GAP SERPL CALC-SCNC: 12 MMOL/L (ref 3–11)
AST SERPL-CCNC: 35 U/L (ref 10–40)
BACTERIA #/AREA URNS HPF: ABNORMAL /HPF
BARBITURATES UR QL SCN>200 NG/ML: NEGATIVE
BASOPHILS # BLD AUTO: 0.02 K/UL (ref 0–0.2)
BASOPHILS NFR BLD: 0.3 % (ref 0–1.9)
BENZODIAZ UR QL SCN>200 NG/ML: NEGATIVE
BILIRUB SERPL-MCNC: 0.4 MG/DL (ref 0.1–1)
BILIRUB UR QL STRIP: NEGATIVE
BUN SERPL-MCNC: 4 MG/DL (ref 6–20)
BZE UR QL SCN: NEGATIVE
CALCIUM SERPL-MCNC: 8.2 MG/DL (ref 8.7–10.5)
CANNABINOIDS UR QL SCN: NEGATIVE
CHLORIDE SERPL-SCNC: 99 MMOL/L (ref 95–110)
CLARITY UR: ABNORMAL
CO2 SERPL-SCNC: 22 MMOL/L (ref 23–29)
COLOR UR: YELLOW
CREAT SERPL-MCNC: 0.7 MG/DL (ref 0.5–1.4)
CREAT UR-MCNC: 80.1 MG/DL (ref 15–325)
DIFFERENTIAL METHOD BLD: ABNORMAL
EOSINOPHIL # BLD AUTO: 0.1 K/UL (ref 0–0.5)
EOSINOPHIL NFR BLD: 0.8 % (ref 0–8)
ERYTHROCYTE [DISTWIDTH] IN BLOOD BY AUTOMATED COUNT: 12.8 % (ref 11.5–14.5)
EST. GFR  (NO RACE VARIABLE): >60 ML/MIN/1.73 M^2
ETHANOL SERPL-MCNC: 35 MG/DL
GLUCOSE SERPL-MCNC: 193 MG/DL (ref 70–110)
GLUCOSE UR QL STRIP: NEGATIVE
HCT VFR BLD AUTO: 32.2 % (ref 37–48.5)
HGB BLD-MCNC: 11.2 G/DL (ref 12–16)
HGB UR QL STRIP: ABNORMAL
HYALINE CASTS #/AREA URNS LPF: 1 /LPF
IMM GRANULOCYTES # BLD AUTO: 0.02 K/UL (ref 0–0.04)
IMM GRANULOCYTES NFR BLD AUTO: 0.3 % (ref 0–0.5)
KETONES UR QL STRIP: NEGATIVE
LEUKOCYTE ESTERASE UR QL STRIP: ABNORMAL
LIPASE SERPL-CCNC: 9 U/L (ref 13–75)
LYMPHOCYTES # BLD AUTO: 0.7 K/UL (ref 1–4.8)
LYMPHOCYTES NFR BLD: 10.1 % (ref 18–48)
MCH RBC QN AUTO: 32.4 PG (ref 27–31)
MCHC RBC AUTO-ENTMCNC: 34.8 G/DL (ref 32–36)
MCV RBC AUTO: 93 FL (ref 82–98)
METHADONE UR QL SCN>300 NG/ML: NEGATIVE
MICROSCOPIC COMMENT: ABNORMAL
MONOCYTES # BLD AUTO: 0.9 K/UL (ref 0.3–1)
MONOCYTES NFR BLD: 13.8 % (ref 4–15)
NEUTROPHILS # BLD AUTO: 4.9 K/UL (ref 1.8–7.7)
NEUTROPHILS NFR BLD: 74.7 % (ref 38–73)
NITRITE UR QL STRIP: POSITIVE
NRBC BLD-RTO: 0 /100 WBC
OPIATES UR QL SCN: NEGATIVE
PCP UR QL SCN>25 NG/ML: NEGATIVE
PH UR STRIP: 7 [PH] (ref 5–8)
PLATELET # BLD AUTO: 154 K/UL (ref 150–450)
PMV BLD AUTO: 10.5 FL (ref 9.2–12.9)
POTASSIUM SERPL-SCNC: 3.1 MMOL/L (ref 3.5–5.1)
PROT SERPL-MCNC: 6.3 G/DL (ref 6–8.4)
PROT UR QL STRIP: ABNORMAL
RBC # BLD AUTO: 3.46 M/UL (ref 4–5.4)
RBC #/AREA URNS HPF: 80 /HPF (ref 0–4)
SODIUM SERPL-SCNC: 133 MMOL/L (ref 136–145)
SP GR UR STRIP: 1.01 (ref 1–1.03)
SQUAMOUS #/AREA URNS HPF: 2 /HPF
TOXICOLOGY INFORMATION: NORMAL
URN SPEC COLLECT METH UR: ABNORMAL
UROBILINOGEN UR STRIP-ACNC: 1 EU/DL
WBC # BLD AUTO: 6.52 K/UL (ref 3.9–12.7)
WBC #/AREA URNS HPF: >100 /HPF (ref 0–5)

## 2024-07-20 PROCEDURE — 80053 COMPREHEN METABOLIC PANEL: CPT | Performed by: CLINICAL NURSE SPECIALIST

## 2024-07-20 PROCEDURE — 63600175 PHARM REV CODE 636 W HCPCS: Performed by: CLINICAL NURSE SPECIALIST

## 2024-07-20 PROCEDURE — 83690 ASSAY OF LIPASE: CPT | Performed by: CLINICAL NURSE SPECIALIST

## 2024-07-20 PROCEDURE — 96375 TX/PRO/DX INJ NEW DRUG ADDON: CPT

## 2024-07-20 PROCEDURE — 87186 SC STD MICRODIL/AGAR DIL: CPT | Performed by: CLINICAL NURSE SPECIALIST

## 2024-07-20 PROCEDURE — 96365 THER/PROPH/DIAG IV INF INIT: CPT

## 2024-07-20 PROCEDURE — 85025 COMPLETE CBC W/AUTO DIFF WBC: CPT | Performed by: EMERGENCY MEDICINE

## 2024-07-20 PROCEDURE — 87088 URINE BACTERIA CULTURE: CPT | Performed by: CLINICAL NURSE SPECIALIST

## 2024-07-20 PROCEDURE — 25000003 PHARM REV CODE 250: Performed by: CLINICAL NURSE SPECIALIST

## 2024-07-20 PROCEDURE — 81000 URINALYSIS NONAUTO W/SCOPE: CPT | Performed by: CLINICAL NURSE SPECIALIST

## 2024-07-20 PROCEDURE — 99284 EMERGENCY DEPT VISIT MOD MDM: CPT | Mod: 25

## 2024-07-20 PROCEDURE — 87086 URINE CULTURE/COLONY COUNT: CPT | Performed by: CLINICAL NURSE SPECIALIST

## 2024-07-20 PROCEDURE — 36415 COLL VENOUS BLD VENIPUNCTURE: CPT | Performed by: CLINICAL NURSE SPECIALIST

## 2024-07-20 PROCEDURE — 80307 DRUG TEST PRSMV CHEM ANLYZR: CPT | Performed by: CLINICAL NURSE SPECIALIST

## 2024-07-20 PROCEDURE — 82077 ASSAY SPEC XCP UR&BREATH IA: CPT | Performed by: CLINICAL NURSE SPECIALIST

## 2024-07-20 RX ORDER — NITROFURANTOIN 25; 75 MG/1; MG/1
100 CAPSULE ORAL 2 TIMES DAILY
Qty: 10 CAPSULE | Refills: 0 | Status: SHIPPED | OUTPATIENT
Start: 2024-07-20 | End: 2024-07-25

## 2024-07-20 RX ORDER — HEPARIN 100 UNIT/ML
5 SYRINGE INTRAVENOUS
Status: DISCONTINUED | OUTPATIENT
Start: 2024-07-20 | End: 2024-07-20 | Stop reason: HOSPADM

## 2024-07-20 RX ORDER — PHENAZOPYRIDINE HYDROCHLORIDE 200 MG/1
200 TABLET, FILM COATED ORAL 3 TIMES DAILY
Qty: 15 TABLET | Refills: 0 | Status: SHIPPED | OUTPATIENT
Start: 2024-07-20 | End: 2024-07-25

## 2024-07-20 RX ORDER — HYDROMORPHONE HYDROCHLORIDE 1 MG/ML
0.5 INJECTION, SOLUTION INTRAMUSCULAR; INTRAVENOUS; SUBCUTANEOUS ONCE
Status: COMPLETED | OUTPATIENT
Start: 2024-07-20 | End: 2024-07-20

## 2024-07-20 RX ORDER — PHENAZOPYRIDINE HYDROCHLORIDE 100 MG/1
200 TABLET, FILM COATED ORAL ONCE
Status: COMPLETED | OUTPATIENT
Start: 2024-07-20 | End: 2024-07-20

## 2024-07-20 RX ADMIN — PHENAZOPYRIDINE 200 MG: 100 TABLET ORAL at 01:07

## 2024-07-20 RX ADMIN — CEFTRIAXONE 1 G: 1 INJECTION, POWDER, FOR SOLUTION INTRAMUSCULAR; INTRAVENOUS at 01:07

## 2024-07-20 RX ADMIN — HYDROMORPHONE HYDROCHLORIDE 0.5 MG: 1 INJECTION, SOLUTION INTRAMUSCULAR; INTRAVENOUS; SUBCUTANEOUS at 01:07

## 2024-07-20 RX ADMIN — HEPARIN 500 UNITS: 100 SYRINGE at 01:07

## 2024-07-20 RX ADMIN — POTASSIUM BICARBONATE 20 MEQ: 391 TABLET, EFFERVESCENT ORAL at 01:07

## 2024-07-20 NOTE — ED PROVIDER NOTES
"Encounter Date: 2024       History     Chief Complaint   Patient presents with    Abdominal Pain     Reports dysuria, malodorous urine, frequency x 3-4 days. Also states "my pancreas and kidney is hurting."     43-year-old female presents to the emergency room with dysuria, odorous urine, frequency for the last 3-4 days.  Patient concerned that she may have pancreatitis again due to her pancreas in her kidneys hurting her.  Patient also requesting that report be access.  History of MS, alcoholism.  Patient does smell of alcohol.  Patient states she drinks occasionally in her alcohol choice is crown.  Patient is also on Subutex        Review of patient's allergies indicates:   Allergen Reactions    Metoclopramide hcl     Tramadol Anaphylaxis     Past Medical History:   Diagnosis Date    Depression     Hypertension     Multiple sclerosis      Past Surgical History:   Procedure Laterality Date     SECTION  2006    DILATION AND CURETTAGE OF UTERUS      LITHOTRIPSY      PORTACATH PLACEMENT Right 02/10/2020     Family History   Adopted: Yes     Social History     Tobacco Use    Smoking status: Former     Types: Vaping with nicotine    Smokeless tobacco: Former   Substance Use Topics    Alcohol use: Yes     Comment: occassionally    Drug use: Never     Review of Systems   Constitutional:  Negative for fever.   HENT:  Negative for sore throat.    Respiratory:  Negative for shortness of breath.    Cardiovascular:  Negative for chest pain.   Gastrointestinal:  Positive for abdominal pain. Negative for nausea.   Genitourinary:  Positive for dysuria, frequency and urgency.   Musculoskeletal:  Negative for back pain.   Skin:  Negative for rash.   Neurological:  Negative for weakness.   Hematological:  Does not bruise/bleed easily.   All other systems reviewed and are negative.      Physical Exam     Initial Vitals [24 1147]   BP Pulse Resp Temp SpO2   (!) 113/93 78 18 99.1 °F (37.3 °C) 98 %      MAP     "   --         Physical Exam    Nursing note and vitals reviewed.  Constitutional: She appears well-developed and well-nourished.   HENT:   Head: Normocephalic and atraumatic.   Eyes: Pupils are equal, round, and reactive to light.   Neck:   Normal range of motion.  Cardiovascular:  Normal rate and regular rhythm.           Pulmonary/Chest: Breath sounds normal.   Abdominal: Abdomen is soft. Bowel sounds are normal.   Musculoskeletal:         General: Normal range of motion.      Cervical back: Normal range of motion.     Neurological: She is alert and oriented to person, place, and time.   Skin: Skin is warm and dry.   Psychiatric: She has a normal mood and affect.         ED Course   Procedures  Labs Reviewed   URINALYSIS, REFLEX TO URINE CULTURE - Abnormal       Result Value    Specimen UA Urine, Clean Catch      Color, UA Yellow      Appearance, UA Cloudy (*)     pH, UA 7.0      Specific Gravity, UA 1.015      Protein, UA 2+ (*)     Glucose, UA Negative      Ketones, UA Negative      Bilirubin (UA) Negative      Occult Blood UA 3+ (*)     Nitrite, UA Positive (*)     Urobilinogen, UA 1.0      Leukocytes, UA 3+ (*)     Narrative:     Preferred Collection Type->Urine, Clean Catch  Specimen Source->Urine   COMPREHENSIVE METABOLIC PANEL - Abnormal    Sodium 133 (*)     Potassium 3.1 (*)     Chloride 99      CO2 22 (*)     Glucose 193 (*)     BUN 4 (*)     Creatinine 0.7      Calcium 8.2 (*)     Total Protein 6.3      Albumin 3.0 (*)     Total Bilirubin 0.4      Alkaline Phosphatase 127      AST 35      ALT 65 (*)     eGFR >60.0      Anion Gap 12 (*)    LIPASE - Abnormal    Lipase 9 (*)    URINALYSIS MICROSCOPIC - Abnormal    RBC, UA 80 (*)     WBC, UA >100 (*)     Bacteria Many (*)     Squam Epithel, UA 2      Hyaline Casts, UA 1      Microscopic Comment SEE COMMENT      Narrative:     Preferred Collection Type->Urine, Clean Catch  Specimen Source->Urine   CBC W/ AUTO DIFFERENTIAL - Abnormal    WBC 6.52      RBC 3.46  (*)     Hemoglobin 11.2 (*)     Hematocrit 32.2 (*)     MCV 93      MCH 32.4 (*)     MCHC 34.8      RDW 12.8      Platelets 154      MPV 10.5      Immature Granulocytes 0.3      Gran # (ANC) 4.9      Immature Grans (Abs) 0.02      Lymph # 0.7 (*)     Mono # 0.9      Eos # 0.1      Baso # 0.02      nRBC 0      Gran % 74.7 (*)     Lymph % 10.1 (*)     Mono % 13.8      Eosinophil % 0.8      Basophil % 0.3      Differential Method Automated     ALCOHOL,MEDICAL (ETHANOL) - Abnormal    Alcohol, Serum 35 (*)    CULTURE, URINE   DRUG SCREEN PANEL, URINE EMERGENCY    Benzodiazepines Negative      Methadone metabolites Negative      Cocaine (Metab.) Negative      Opiate Scrn, Ur Negative      Barbiturate Screen, Ur Negative      Amphetamine Screen, Ur Negative      THC Negative      Phencyclidine Negative      Creatinine, Urine 80.1      Toxicology Information SEE COMMENT      Narrative:     Specimen Source->Urine          Imaging Results    None          Medications   heparin, porcine (PF) 100 unit/mL injection flush 500 Units (500 Units Intravenous Given 7/20/24 1357)   cefTRIAXone (Rocephin) 1 g in D5W 100 mL IVPB (MB+) (0 g Intravenous Stopped 7/20/24 1355)   potassium bicarbonate disintegrating tablet 20 mEq (20 mEq Oral Given 7/20/24 1318)   HYDROmorphone injection 0.5 mg (0.5 mg Intravenous Given 7/20/24 1335)   phenazopyridine tablet 200 mg (200 mg Oral Given 7/20/24 1335)     Medical Decision Making  Amount and/or Complexity of Data Reviewed  Labs: ordered.    Risk  Prescription drug management.                                      Clinical Impression:  Final diagnoses:  [E87.6] Hypokalemia (Primary)  [N39.0] Urinary tract infection without hematuria, site unspecified          ED Disposition Condition    Discharge Stable          ED Prescriptions       Medication Sig Dispense Start Date End Date Auth. Provider    nitrofurantoin, macrocrystal-monohydrate, (MACROBID) 100 MG capsule Take 1 capsule (100 mg total) by  mouth 2 (two) times daily. for 5 days 10 capsule 7/20/2024 7/25/2024 Lesley Hernandez NP    phenazopyridine (PYRIDIUM) 200 MG tablet Take 1 tablet (200 mg total) by mouth 3 (three) times daily. for 5 days 15 tablet 7/20/2024 7/25/2024 Lesley Hernandez NP          Follow-up Information       Follow up With Specialties Details Why Contact Info    Rula Betancourt MD Internal Medicine  As needed [address]  [address]  [address]  [facility] [address]  [phone]               Lesley Hernandez NP  07/20/24 7373

## 2024-07-24 LAB — BACTERIA UR CULT: ABNORMAL

## 2024-07-25 ENCOUNTER — PATIENT MESSAGE (OUTPATIENT)
Dept: PSYCHIATRY | Facility: CLINIC | Age: 44
End: 2024-07-25
Payer: MEDICAID

## 2024-07-30 NOTE — PROGRESS NOTES
[facility] Neurology Initial Office Visit Note    Initial Visit Date: 7/31/2024  Current Visit Date:  07/31/2024    Chief Complaint:     Chief Complaint   Patient presents with    Multiple Sclerosis     New Patient referral--has missed one dose due to loss of insurance       History of Present Illness:      This is 43 y.o. Left  hand dominant  wheelchair bound female with history of recurrent UTIs, who is referred for Multiple Sclerosis. Patient has been seen by Dr. Acevedo. Last visit with Dr. Acevedo in 1/17/2023.     Age of diagnosis: 22 years old     Presenting Symptoms/Progression: initially presented OD optic neuritis. Started having difficulty ambulating for at least 4-5 years, requiring a cane for at least 4-5 years ago. Has been progressively wheelchair bound for 2 years. Has neurogenic bladder, requiring diapers for the past 1 year. Also has recurrent UTI. Reports cognitive difficulties. Has been non compliant with DMD during earlier course of the disease.     Risk Factors:   Tobacco use: Yes smoked 1/2 ppd up until 2-3 years ago.   Alcohol use: Yes occasional  Cardiovascular exercise: Not Applicable  Chronic cardiovascular risk factors: Yes hypertension, DM II, CKD   Exposure to EBV: Not Applicable    Medications:     Current Disease Modifying Medication:   Ofatumamab 40 mg once per month (late 2020 to present): progression of disability to wheelchair bound.     Current CNS medications:   Not applicable.     Prior Disease Modifying Medications:   Natalizumab (5/2016 to 7/28/2020): ineffective   ?Cyclophosphamide   Avonex: ineffective   Copaxone: ineffective    Prior CNS medications:   Denied     Labs:       Results for orders placed or performed during the hospital encounter of 07/20/24   Urine culture    Specimen: Urine   Result Value Ref Range    Urine Culture, Routine ESCHERICHIA COLI  50,000 - 99,999 cfu/ml   (A)        Susceptibility    Escherichia coli - CULTURE, URINE     Amp/Sulbactam <=8/4  Sensitive mcg/mL     Ampicillin <=8 Sensitive mcg/mL     Ceftriaxone <=1 Sensitive mcg/mL     Cefazolin <=2 Sensitive mcg/mL     Ciprofloxacin <=0.25 Sensitive mcg/mL     Cefepime <=2 Sensitive mcg/mL     Ertapenem <=0.5 Sensitive mcg/mL     Nitrofurantoin <=32 Sensitive mcg/mL     Gentamicin <=2 Sensitive mcg/mL     Levofloxacin <=0.5 Sensitive mcg/mL     Meropenem <=1 Sensitive mcg/mL     Piperacillin/Tazo <=8 Sensitive mcg/mL     Trimeth/Sulfa <=2/38 Sensitive mcg/mL     Tobramycin <=2 Sensitive mcg/mL   Urinalysis, Reflex to Urine Culture Urine, Clean Catch    Specimen: Urine, Clean Catch   Result Value Ref Range    Specimen UA Urine, Clean Catch     Color, UA Yellow Yellow, Straw, Lauren    Appearance, UA Cloudy (A) Clear    pH, UA 7.0 5.0 - 8.0    Specific Gravity, UA 1.015 1.005 - 1.030    Protein, UA 2+ (A) Negative    Glucose, UA Negative Negative    Ketones, UA Negative Negative    Bilirubin (UA) Negative Negative    Occult Blood UA 3+ (A) Negative    Nitrite, UA Positive (A) Negative    Urobilinogen, UA 1.0 <2.0 EU/dL    Leukocytes, UA 3+ (A) Negative   Comprehensive metabolic panel   Result Value Ref Range    Sodium 133 (L) 136 - 145 mmol/L    Potassium 3.1 (L) 3.5 - 5.1 mmol/L    Chloride 99 95 - 110 mmol/L    CO2 22 (L) 23 - 29 mmol/L    Glucose 193 (H) 70 - 110 mg/dL    BUN 4 (L) 6 - 20 mg/dL    Creatinine 0.7 0.5 - 1.4 mg/dL    Calcium 8.2 (L) 8.7 - 10.5 mg/dL    Total Protein 6.3 6.0 - 8.4 g/dL    Albumin 3.0 (L) 3.5 - 5.2 g/dL    Total Bilirubin 0.4 0.1 - 1.0 mg/dL    Alkaline Phosphatase 127 55 - 135 U/L    AST 35 10 - 40 U/L    ALT 65 (H) 10 - 44 U/L    eGFR >60.0 >60 mL/min/1.73 m^2    Anion Gap 12 (H) 3 - 11 mmol/L   Lipase   Result Value Ref Range    Lipase 9 (L) 13 - 75 U/L   Urinalysis Microscopic   Result Value Ref Range    RBC, UA 80 (H) 0 - 4 /hpf    WBC, UA >100 (H) 0 - 5 /hpf    Bacteria Many (A) None-Occ /hpf    Squam Epithel, UA 2 /hpf    Hyaline Casts, UA 1 0-1/lpf /lpf    Microscopic  Comment SEE COMMENT    CBC Auto Differential   Result Value Ref Range    WBC 6.52 3.90 - 12.70 K/uL    RBC 3.46 (L) 4.00 - 5.40 M/uL    Hemoglobin 11.2 (L) 12.0 - 16.0 g/dL    Hematocrit 32.2 (L) 37.0 - 48.5 %    MCV 93 82 - 98 fL    MCH 32.4 (H) 27.0 - 31.0 pg    MCHC 34.8 32.0 - 36.0 g/dL    RDW 12.8 11.5 - 14.5 %    Platelets 154 150 - 450 K/uL    MPV 10.5 9.2 - 12.9 fL    Immature Granulocytes 0.3 0.0 - 0.5 %    Gran # (ANC) 4.9 1.8 - 7.7 K/uL    Immature Grans (Abs) 0.02 0.00 - 0.04 K/uL    Lymph # 0.7 (L) 1.0 - 4.8 K/uL    Mono # 0.9 0.3 - 1.0 K/uL    Eos # 0.1 0.0 - 0.5 K/uL    Baso # 0.02 0.00 - 0.20 K/uL    nRBC 0 0 /100 WBC    Gran % 74.7 (H) 38.0 - 73.0 %    Lymph % 10.1 (L) 18.0 - 48.0 %    Mono % 13.8 4.0 - 15.0 %    Eosinophil % 0.8 0.0 - 8.0 %    Basophil % 0.3 0.0 - 1.9 %    Differential Method Automated    Drug screen panel, emergency   Result Value Ref Range    Benzodiazepines Negative Negative    Methadone metabolites Negative Negative    Cocaine (Metab.) Negative Negative    Opiate Scrn, Ur Negative Negative    Barbiturate Screen, Ur Negative Negative    Amphetamine Screen, Ur Negative Negative    THC Negative Negative    Phencyclidine Negative Negative    Creatinine, Urine 80.1 15.0 - 325.0 mg/dL    Toxicology Information SEE COMMENT    Ethanol   Result Value Ref Range    Alcohol, Serum 35 (H) <10 mg/dL       Imaging:     MRI Brain w/o Shelton 8/20/2023:  I have reviewed the study independently and with the patient. Limited study. As per report, There is increase in the number of white matter lesions along the corpus callosum and periventricular white matter compared to the examination dated 11/29/2012.  There are stable lesions within the midbrain and the right brachium pontis.  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 There is a focus of T2/FLAIR signal hyperintensity within the left thalamus with no definitive diffusion restriction. No diffusion restriction is identified to suggest  acute demyelination process. I am unable to access prior images.     MRI C spine +/- Shelton 8/20/2023: I have reviewed the study independently and with the patient. Extensive white matter T2 changes in C and T cord without contrast enhancement.     Review of Systems:     Review of Systems   All other systems reviewed and are negative.      Physical Exams:     Vitals:    07/31/24 1130   BP: 139/89   Pulse: 82   Resp: 12   Temp: 98.1 °F (36.7 °C)       Physical Exam  Vitals and nursing note reviewed.   Constitutional:       Appearance: Normal appearance.   HENT:      Head: Normocephalic and atraumatic.      Nose: Nose normal.      Mouth/Throat:      Mouth: Mucous membranes are moist.      Pharynx: Oropharynx is clear.   Eyes:      Conjunctiva/sclera: Conjunctivae normal.   Cardiovascular:      Rate and Rhythm: Normal rate and regular rhythm.      Pulses: Normal pulses.   Pulmonary:      Effort: Pulmonary effort is normal.      Breath sounds: Normal breath sounds.   Abdominal:      General: Abdomen is flat.   Musculoskeletal:         General: Normal range of motion.      Cervical back: Normal range of motion.   Skin:     General: Skin is warm.   Neurological:      Mental Status: She is alert.         Comprehensive Neurological Exam:  Mental Status: Alert Oriented to Self, Date, and Place. Comprehension wnl. No dysarthria.   CN II - XII: GIANLUCA, No APD, VFFC, No ptosis OU, EOMI with OS EVELIA, LT/Temp symmetric in CN V1-3 distribution, Hearing grossly intact, Face Symmetric, Tongue and Uvula midline, Trapezius symmetric bilateral.   Motor: increased left tone in LUE and bilateral LE, and bulk wnl throughout, no abnormal involuntary or voluntary movements, 5/5 to confrontation except for 4-/5 in LUE, 3/5 in L IP/EHL/TA, 4/5 Right IP, left pronator drift.   Sensory: Vibration absent in L foot, decreased in R foot, decreased in left hand, decreased temperature in left arm and bilateral LE, LLE > RLE.   Reflexes:  plantar reflexes  present bilaterally.   Cerebellar: FNF wnl b/l, RAHM wnl b/l  Gait: wheelchair bound     Assessment:     This is 43 y.o. 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 Now with recurrent UTIs.     EDSS: 7.5    Problem List Items Addressed This Visit          Neuro    Multiple sclerosis - Primary    Relevant Medications    KESIMPTA PEN 20 mg/0.4 mL PnIj    Spastic diplegia, acquired, lower extremity    Relevant Medications    onabotulinumtoxina injection 800 Units    Other Relevant Orders    Hepatitis B Core Antibody, IgM    Hepatitis B Core Antibody, Total    Hepatitis B Surface Antigen    Hepatitis C Antibody    Immunoglobulins (IgG, IgA, IgM) Quantitative    Quantiferon Gold TB    CBC Auto Differential    Comprehensive Metabolic Panel       Renal/    Recurrent UTI    Relevant Orders    Hepatitis B Core Antibody, IgM    Hepatitis B Core Antibody, Total    Hepatitis B Surface Antigen    Hepatitis C Antibody    Immunoglobulins (IgG, IgA, IgM) Quantitative    Quantiferon Gold TB    CBC Auto Differential    Comprehensive Metabolic Panel    Neurogenic bladder    Relevant Orders    Hepatitis B Core Antibody, IgM    Hepatitis B Core Antibody, Total    Hepatitis B Surface Antigen    Hepatitis C Antibody    Immunoglobulins (IgG, IgA, IgM) Quantitative    Quantiferon Gold TB    CBC Auto Differential    Comprehensive Metabolic Panel    Ambulatory referral/consult to Urology       Palliative Care    High risk medication use       Plan:     [] c/w ofatumamab 40 mg subcutaneous once per month   [] repeat MRI Brain and C spine w/o Shelton   [] referral to urology for neurogenic bladder. Patient use to see Dr. Holcomb in [place].   [] labs today  [] Botox bilateral Lower Extremity Protocol:  Left  Gastrocnemius Lateral Head: 3 sites, 75 units   Gastrocnemius Medial  Head: 3 sites, 75 units  Soleus: 3 sites, 75 units  Tibialis Posterior: 3 sites, 75 units  Flexor Hallucis Longus: 1 site, 50 units   Flexor Digitorum Longus: 1 site, 50 units    Right  Gastrocnemius Lateral Head: 3 sites, 75 units   Gastrocnemius Medial Head: 3 sites, 75 units  Soleus: 3 sites, 75 units  Tibialis Posterior: 3 sites, 75 units  Flexor Hallucis Longus: 1 site, 50 units   Flexor Digitorum Longus: 1 site, 50 units      Total Units: 800 units      RTC for Botox Clinic  RTC 3 months     Visit today is associated with current or anticipated ongoing medical care related to this patient's single serious condition/complex condition as documented above.     I have explained the treatment plan, diagnosis, and prognosis to patient. All questions are answered to the best of my knowledge.     Face to face time 60 minutes, including documentation, chart review, counseling, education, review of test results, relevant medical records, and coordination of care.

## 2024-07-31 ENCOUNTER — LAB VISIT (OUTPATIENT)
Dept: LAB | Facility: HOSPITAL | Age: 44
End: 2024-07-31
Attending: PSYCHIATRY & NEUROLOGY
Payer: MEDICAID

## 2024-07-31 ENCOUNTER — OFFICE VISIT (OUTPATIENT)
Dept: NEUROLOGY | Facility: CLINIC | Age: 44
End: 2024-07-31
Payer: MEDICAID

## 2024-07-31 VITALS
HEIGHT: 60 IN | TEMPERATURE: 98 F | DIASTOLIC BLOOD PRESSURE: 89 MMHG | HEART RATE: 82 BPM | BODY MASS INDEX: 28.47 KG/M2 | RESPIRATION RATE: 12 BRPM | WEIGHT: 145 LBS | SYSTOLIC BLOOD PRESSURE: 139 MMHG

## 2024-07-31 DIAGNOSIS — N39.0 RECURRENT UTI: ICD-10-CM

## 2024-07-31 DIAGNOSIS — G35 SECONDARY PROGRESSIVE MULTIPLE SCLEROSIS: ICD-10-CM

## 2024-07-31 DIAGNOSIS — G35 SECONDARY PROGRESSIVE MULTIPLE SCLEROSIS: Primary | ICD-10-CM

## 2024-07-31 DIAGNOSIS — N31.9 NEUROGENIC BLADDER: ICD-10-CM

## 2024-07-31 DIAGNOSIS — G82.20 SPASTIC DIPLEGIA, ACQUIRED, LOWER EXTREMITY: ICD-10-CM

## 2024-07-31 DIAGNOSIS — Z79.899 HIGH RISK MEDICATION USE: ICD-10-CM

## 2024-07-31 LAB
ALBUMIN SERPL-MCNC: 3.9 G/DL (ref 3.5–5)
ALBUMIN/GLOB SERPL: 1.6 RATIO (ref 1.1–2)
ALP SERPL-CCNC: 136 UNIT/L (ref 40–150)
ALT SERPL-CCNC: 78 UNIT/L (ref 0–55)
ANION GAP SERPL CALC-SCNC: 11 MEQ/L
AST SERPL-CCNC: 65 UNIT/L (ref 5–34)
BASOPHILS # BLD AUTO: 0.05 X10(3)/MCL
BASOPHILS NFR BLD AUTO: 0.6 %
BILIRUB SERPL-MCNC: 0.5 MG/DL
BUN SERPL-MCNC: 8.1 MG/DL (ref 7–18.7)
CALCIUM SERPL-MCNC: 11.1 MG/DL (ref 8.4–10.2)
CHLORIDE SERPL-SCNC: 109 MMOL/L (ref 98–107)
CO2 SERPL-SCNC: 21 MMOL/L (ref 22–29)
CREAT SERPL-MCNC: 0.89 MG/DL (ref 0.55–1.02)
CREAT/UREA NIT SERPL: 9
EOSINOPHIL # BLD AUTO: 0.18 X10(3)/MCL (ref 0–0.9)
EOSINOPHIL NFR BLD AUTO: 2 %
ERYTHROCYTE [DISTWIDTH] IN BLOOD BY AUTOMATED COUNT: 12.4 % (ref 11.5–17)
GFR SERPLBLD CREATININE-BSD FMLA CKD-EPI: >60 ML/MIN/1.73/M2
GLOBULIN SER-MCNC: 2.5 GM/DL (ref 2.4–3.5)
GLUCOSE SERPL-MCNC: 197 MG/DL (ref 74–100)
HBV CORE AB SERPL QL IA: NONREACTIVE
HBV CORE IGM SERPL QL IA: NONREACTIVE
HBV SURFACE AG SERPL QL IA: NONREACTIVE
HCT VFR BLD AUTO: 36 % (ref 37–47)
HCV AB SERPL QL IA: NONREACTIVE
HGB BLD-MCNC: 12.4 G/DL (ref 12–16)
IGA SERPL-MCNC: 148 MG/DL (ref 65–421)
IGG SERPL-MCNC: 609 MG/DL (ref 522–1631)
IGM SERPL-MCNC: 29 MG/DL (ref 33–293)
IMM GRANULOCYTES # BLD AUTO: 0.04 X10(3)/MCL (ref 0–0.04)
IMM GRANULOCYTES NFR BLD AUTO: 0.5 %
LYMPHOCYTES # BLD AUTO: 1.71 X10(3)/MCL (ref 0.6–4.6)
LYMPHOCYTES NFR BLD AUTO: 19.3 %
MCH RBC QN AUTO: 32.6 PG (ref 27–31)
MCHC RBC AUTO-ENTMCNC: 34.4 G/DL (ref 33–36)
MCV RBC AUTO: 94.7 FL (ref 80–94)
MONOCYTES # BLD AUTO: 0.6 X10(3)/MCL (ref 0.1–1.3)
MONOCYTES NFR BLD AUTO: 6.8 %
NEUTROPHILS # BLD AUTO: 6.3 X10(3)/MCL (ref 2.1–9.2)
NEUTROPHILS NFR BLD AUTO: 70.8 %
NRBC BLD AUTO-RTO: 0 %
PLATELET # BLD AUTO: 351 X10(3)/MCL (ref 130–400)
PMV BLD AUTO: 10.4 FL (ref 7.4–10.4)
POTASSIUM SERPL-SCNC: 3.9 MMOL/L (ref 3.5–5.1)
PROT SERPL-MCNC: 6.4 GM/DL (ref 6.4–8.3)
RBC # BLD AUTO: 3.8 X10(6)/MCL (ref 4.2–5.4)
SODIUM SERPL-SCNC: 141 MMOL/L (ref 136–145)
WBC # BLD AUTO: 8.88 X10(3)/MCL (ref 4.5–11.5)

## 2024-07-31 PROCEDURE — 85025 COMPLETE CBC W/AUTO DIFF WBC: CPT

## 2024-07-31 PROCEDURE — 86705 HEP B CORE ANTIBODY IGM: CPT

## 2024-07-31 PROCEDURE — 36415 COLL VENOUS BLD VENIPUNCTURE: CPT

## 2024-07-31 PROCEDURE — 99215 OFFICE O/P EST HI 40 MIN: CPT | Mod: PBBFAC | Performed by: PSYCHIATRY & NEUROLOGY

## 2024-07-31 PROCEDURE — 87340 HEPATITIS B SURFACE AG IA: CPT

## 2024-07-31 PROCEDURE — 80053 COMPREHEN METABOLIC PANEL: CPT

## 2024-07-31 PROCEDURE — 82784 ASSAY IGA/IGD/IGG/IGM EACH: CPT

## 2024-07-31 PROCEDURE — 86704 HEP B CORE ANTIBODY TOTAL: CPT

## 2024-07-31 PROCEDURE — 86803 HEPATITIS C AB TEST: CPT

## 2024-07-31 PROCEDURE — 86480 TB TEST CELL IMMUN MEASURE: CPT

## 2024-07-31 RX ORDER — OFATUMUMAB 20 MG/.4ML
20 INJECTION, SOLUTION SUBCUTANEOUS
Qty: 0.4 ML | Refills: 5 | Status: SHIPPED | OUTPATIENT
Start: 2024-07-31 | End: 2024-11-28

## 2024-07-31 RX ORDER — OFATUMUMAB 20 MG/.4ML
20 INJECTION, SOLUTION SUBCUTANEOUS
Start: 2024-07-31 | End: 2024-07-31

## 2024-07-31 RX ORDER — OFATUMUMAB 20 MG/.4ML
20 INJECTION, SOLUTION SUBCUTANEOUS
Start: 2024-07-31 | End: 2024-07-31 | Stop reason: SDUPTHER

## 2024-08-02 LAB
GAMMA INTERFERON BACKGROUND BLD IA-ACNC: 0.01 IU/ML
M TB IFN-G BLD-IMP: NEGATIVE
M TB IFN-G CD4+ BCKGRND COR BLD-ACNC: 0.21 IU/ML
M TB IFN-G CD4+CD8+ BCKGRND COR BLD-ACNC: 0.12 IU/ML
MITOGEN IGNF BCKGRD COR BLD-ACNC: 9.99 IU/ML

## 2024-08-04 ENCOUNTER — HOSPITAL ENCOUNTER (EMERGENCY)
Facility: HOSPITAL | Age: 44
Discharge: HOME | End: 2024-08-04
Attending: EMERGENCY MEDICINE
Payer: MEDICAID

## 2024-08-04 VITALS
OXYGEN SATURATION: 93 % | SYSTOLIC BLOOD PRESSURE: 158 MMHG | HEIGHT: 60 IN | TEMPERATURE: 98 F | RESPIRATION RATE: 18 BRPM | WEIGHT: 145 LBS | DIASTOLIC BLOOD PRESSURE: 77 MMHG | BODY MASS INDEX: 28.47 KG/M2 | HEART RATE: 100 BPM

## 2024-08-04 DIAGNOSIS — I10 HYPERTENSION, UNSPECIFIED TYPE: ICD-10-CM

## 2024-08-04 DIAGNOSIS — F10.920 ALCOHOLIC INTOXICATION WITHOUT COMPLICATION: Primary | ICD-10-CM

## 2024-08-04 DIAGNOSIS — R51.9 NONINTRACTABLE HEADACHE, UNSPECIFIED CHRONICITY PATTERN, UNSPECIFIED HEADACHE TYPE: ICD-10-CM

## 2024-08-04 LAB
ANION GAP SERPL CALC-SCNC: 18 MMOL/L (ref 3–11)
BASOPHILS # BLD AUTO: 0.07 K/UL (ref 0–0.2)
BASOPHILS NFR BLD: 0.6 % (ref 0–1.9)
BUN SERPL-MCNC: 7 MG/DL (ref 6–20)
CALCIUM SERPL-MCNC: 9.1 MG/DL (ref 8.7–10.5)
CHLORIDE SERPL-SCNC: 107 MMOL/L (ref 95–110)
CO2 SERPL-SCNC: 23 MMOL/L (ref 23–29)
CREAT SERPL-MCNC: 0.7 MG/DL (ref 0.5–1.4)
DIFFERENTIAL METHOD BLD: ABNORMAL
EOSINOPHIL # BLD AUTO: 0.1 K/UL (ref 0–0.5)
EOSINOPHIL NFR BLD: 0.8 % (ref 0–8)
ERYTHROCYTE [DISTWIDTH] IN BLOOD BY AUTOMATED COUNT: 12.5 % (ref 11.5–14.5)
EST. GFR  (NO RACE VARIABLE): >60 ML/MIN/1.73 M^2
ETHANOL SERPL-MCNC: 249 MG/DL
GLUCOSE SERPL-MCNC: 128 MG/DL (ref 70–110)
HCT VFR BLD AUTO: 38.2 % (ref 37–48.5)
HGB BLD-MCNC: 13.2 G/DL (ref 12–16)
IMM GRANULOCYTES # BLD AUTO: 0.05 K/UL (ref 0–0.04)
IMM GRANULOCYTES NFR BLD AUTO: 0.4 % (ref 0–0.5)
LYMPHOCYTES # BLD AUTO: 1.4 K/UL (ref 1–4.8)
LYMPHOCYTES NFR BLD: 12.2 % (ref 18–48)
MAGNESIUM SERPL-MCNC: 1.3 MG/DL (ref 1.6–2.6)
MCH RBC QN AUTO: 32 PG (ref 27–31)
MCHC RBC AUTO-ENTMCNC: 34.6 G/DL (ref 32–36)
MCV RBC AUTO: 93 FL (ref 82–98)
MONOCYTES # BLD AUTO: 0.9 K/UL (ref 0.3–1)
MONOCYTES NFR BLD: 7.4 % (ref 4–15)
NEUTROPHILS # BLD AUTO: 9 K/UL (ref 1.8–7.7)
NEUTROPHILS NFR BLD: 78.6 % (ref 38–73)
NRBC BLD-RTO: 0 /100 WBC
PLATELET # BLD AUTO: 455 K/UL (ref 150–450)
PMV BLD AUTO: 9.8 FL (ref 9.2–12.9)
POTASSIUM SERPL-SCNC: 3.7 MMOL/L (ref 3.5–5.1)
RBC # BLD AUTO: 4.13 M/UL (ref 4–5.4)
SODIUM SERPL-SCNC: 148 MMOL/L (ref 136–145)
WBC # BLD AUTO: 11.46 K/UL (ref 3.9–12.7)

## 2024-08-04 PROCEDURE — 96374 THER/PROPH/DIAG INJ IV PUSH: CPT

## 2024-08-04 PROCEDURE — 85025 COMPLETE CBC W/AUTO DIFF WBC: CPT | Performed by: EMERGENCY MEDICINE

## 2024-08-04 PROCEDURE — 96376 TX/PRO/DX INJ SAME DRUG ADON: CPT

## 2024-08-04 PROCEDURE — 80048 BASIC METABOLIC PNL TOTAL CA: CPT | Performed by: EMERGENCY MEDICINE

## 2024-08-04 PROCEDURE — 99284 EMERGENCY DEPT VISIT MOD MDM: CPT | Mod: 25

## 2024-08-04 PROCEDURE — 82077 ASSAY SPEC XCP UR&BREATH IA: CPT | Performed by: EMERGENCY MEDICINE

## 2024-08-04 PROCEDURE — 36415 COLL VENOUS BLD VENIPUNCTURE: CPT | Performed by: EMERGENCY MEDICINE

## 2024-08-04 PROCEDURE — 96375 TX/PRO/DX INJ NEW DRUG ADDON: CPT

## 2024-08-04 PROCEDURE — 96361 HYDRATE IV INFUSION ADD-ON: CPT

## 2024-08-04 PROCEDURE — 63600175 PHARM REV CODE 636 W HCPCS: Performed by: EMERGENCY MEDICINE

## 2024-08-04 PROCEDURE — 83735 ASSAY OF MAGNESIUM: CPT | Performed by: EMERGENCY MEDICINE

## 2024-08-04 PROCEDURE — 25000003 PHARM REV CODE 250: Performed by: EMERGENCY MEDICINE

## 2024-08-04 RX ORDER — ONDANSETRON HYDROCHLORIDE 2 MG/ML
4 INJECTION, SOLUTION INTRAVENOUS
Status: COMPLETED | OUTPATIENT
Start: 2024-08-04 | End: 2024-08-04

## 2024-08-04 RX ORDER — LORAZEPAM 2 MG/ML
0.5 INJECTION INTRAMUSCULAR
Status: COMPLETED | OUTPATIENT
Start: 2024-08-04 | End: 2024-08-04

## 2024-08-04 RX ORDER — ACETAMINOPHEN 500 MG
1000 TABLET ORAL
Status: COMPLETED | OUTPATIENT
Start: 2024-08-04 | End: 2024-08-04

## 2024-08-04 RX ORDER — HEPARIN 100 UNIT/ML
100 SYRINGE INTRAVENOUS ONCE
Status: COMPLETED | OUTPATIENT
Start: 2024-08-04 | End: 2024-08-04

## 2024-08-04 RX ORDER — KETOROLAC TROMETHAMINE 30 MG/ML
15 INJECTION, SOLUTION INTRAMUSCULAR; INTRAVENOUS
Status: DISCONTINUED | OUTPATIENT
Start: 2024-08-04 | End: 2024-08-04

## 2024-08-04 RX ORDER — ACETAMINOPHEN 500 MG
1000 TABLET ORAL EVERY 8 HOURS PRN
Qty: 30 TABLET | Refills: 0 | Status: SHIPPED | OUTPATIENT
Start: 2024-08-04

## 2024-08-04 RX ORDER — VERAPAMIL HYDROCHLORIDE 40 MG/1
80 TABLET ORAL DAILY
COMMUNITY

## 2024-08-04 RX ORDER — LORAZEPAM 2 MG/ML
1 INJECTION INTRAMUSCULAR
Status: COMPLETED | OUTPATIENT
Start: 2024-08-04 | End: 2024-08-04

## 2024-08-04 RX ORDER — KETOROLAC TROMETHAMINE 30 MG/ML
15 INJECTION, SOLUTION INTRAMUSCULAR; INTRAVENOUS
Status: COMPLETED | OUTPATIENT
Start: 2024-08-04 | End: 2024-08-04

## 2024-08-04 RX ORDER — LANOLIN ALCOHOL/MO/W.PET/CERES
400 CREAM (GRAM) TOPICAL ONCE
Status: COMPLETED | OUTPATIENT
Start: 2024-08-04 | End: 2024-08-04

## 2024-08-04 RX ORDER — TIZANIDINE 2 MG/1
4 TABLET ORAL ONCE
Status: COMPLETED | OUTPATIENT
Start: 2024-08-04 | End: 2024-08-04

## 2024-08-04 RX ORDER — ONDANSETRON 4 MG/1
4 TABLET, ORALLY DISINTEGRATING ORAL EVERY 8 HOURS PRN
Qty: 8 TABLET | Refills: 0 | Status: SHIPPED | OUTPATIENT
Start: 2024-08-04

## 2024-08-04 RX ORDER — FAMOTIDINE 10 MG/ML
40 INJECTION INTRAVENOUS
Status: COMPLETED | OUTPATIENT
Start: 2024-08-04 | End: 2024-08-04

## 2024-08-04 RX ADMIN — ONDANSETRON 4 MG: 2 INJECTION INTRAMUSCULAR; INTRAVENOUS at 02:08

## 2024-08-04 RX ADMIN — Medication 400 MG: at 05:08

## 2024-08-04 RX ADMIN — LORAZEPAM 0.5 MG: 2 INJECTION INTRAMUSCULAR; INTRAVENOUS at 01:08

## 2024-08-04 RX ADMIN — ONDANSETRON 4 MG: 2 INJECTION INTRAMUSCULAR; INTRAVENOUS at 01:08

## 2024-08-04 RX ADMIN — TIZANIDINE 4 MG: 2 TABLET ORAL at 03:08

## 2024-08-04 RX ADMIN — ACETAMINOPHEN 1000 MG: 500 TABLET ORAL at 01:08

## 2024-08-04 RX ADMIN — KETOROLAC TROMETHAMINE 15 MG: 30 INJECTION, SOLUTION INTRAMUSCULAR at 05:08

## 2024-08-04 RX ADMIN — HEPARIN 100 UNITS: 100 SYRINGE at 05:08

## 2024-08-04 RX ADMIN — FAMOTIDINE 40 MG: 10 INJECTION, SOLUTION INTRAVENOUS at 02:08

## 2024-08-04 RX ADMIN — SODIUM CHLORIDE 1000 ML: 9 INJECTION, SOLUTION INTRAVENOUS at 01:08

## 2024-08-04 RX ADMIN — LORAZEPAM 1 MG: 2 INJECTION INTRAMUSCULAR; INTRAVENOUS at 03:08

## 2024-08-04 NOTE — ED PROVIDER NOTES
EMERGENCY DEPARTMENT HISTORY AND PHYSICAL EXAM     This note is dictated on M*Modal word recognition program.  There are word recognition mistakes and grammatical errors that are occasionally missed on review.     Date: 8/4/2024   Patient Name: Janis Reese       History of Presenting Illness      Chief Complaint   Patient presents with    Dizziness     Pt to ED via EMS complaining of headache and dizziness. Hypertensive in EMS care. Patient took a Clonidine x 30 min pta.            Janis Reese is a 43 y.o. female with PMHX of hypertension, bipolar, type 2 diabetes, MS who presents to the emergency department C/O hypertension.    Patient reports waking up this morning with headache and dizziness.  Checked home BP and it was high.  Patient reported not feeling well and  called EMS.   reports typical blood pressure is usually around 130s to 140s over 90s.  Patient has been compliant of her medication but did not take her verapamil today.  Patient streaking whiskey and colon last night.  Denies dinner.   states she does not drink frequently.      PCP: Rula Betancourt MD        Current Facility-Administered Medications   Medication Dose Route Frequency Provider Last Rate Last Admin    onabotulinumtoxina injection 800 Units  800 Units Intramuscular 1 time in Clinic/HOD          Current Outpatient Medications   Medication Sig Dispense Refill    acetaminophen (TYLENOL) 500 MG tablet Take 2 tablets (1,000 mg total) by mouth every 8 (eight) hours as needed for Pain or Temperature greater than (101). 30 tablet 0    blood sugar diagnostic (TRUE METRIX GLUCOSE TEST STRIP) Strp 1 strip by Misc.(Non-Drug; Combo Route) route once daily. 90 strip 3    blood-glucose meter (TRUE METRIX GLUCOSE METER) kit Use as instructed 1 each 0    buprenorphine-naloxone 8-2 mg (SUBOXONE) 8-2 mg Place 1 each under the tongue 2 (two) times a day.      buPROPion (WELLBUTRIN XL) 300 MG 24 hr tablet Take 300 mg  by mouth once daily.      cloNIDine (CATAPRES) 0.1 MG tablet Take 0.1 mg by mouth 3 (three) times daily as needed (withdrawal, anxiety, insomnia).      EScitalopram oxalate (LEXAPRO) 10 MG tablet Take 10 mg by mouth once daily.      glimepiride (AMARYL) 2 MG tablet Take 1 tablet (2 mg total) by mouth before breakfast. 90 tablet 3    KESIMPTA PEN 20 mg/0.4 mL PnIj Inject 20 mg into the skin every 28 days. 0.4 mL 5    lamoTRIgine (LAMICTAL) 100 MG tablet Take 100 mg by mouth. 1 tablet in the am and 1/2 tablet in the pm      lancets 32 gauge Misc 1 lancet  by Misc.(Non-Drug; Combo Route) route once daily. 100 each 3    levothyroxine (SYNTHROID) 50 MCG tablet Take 1 tablet (50 mcg total) by mouth before breakfast. 90 tablet 3    losartan (COZAAR) 50 MG tablet Take 1 tablet (50 mg total) by mouth once daily. 90 tablet 3    ondansetron (ZOFRAN-ODT) 4 MG TbDL Take 1 tablet (4 mg total) by mouth every 8 (eight) hours as needed (Nasuea). 8 tablet 0    QUEtiapine (SEROQUEL) 100 MG Tab Take 1 tablet (100 mg total) by mouth every evening.      tiZANidine (ZANAFLEX) 4 MG tablet Take 4 mg by mouth every 8 (eight) hours.      verapamiL (CALAN) 40 MG Tab Take 80 mg by mouth once daily. Pt doesn't know the strength.             Past History     Past Medical History:   Past Medical History:   Diagnosis Date    Depression     Hypertension     Multiple sclerosis         Past Surgical History:   Past Surgical History:   Procedure Laterality Date     SECTION  2006    DILATION AND CURETTAGE OF UTERUS      LITHOTRIPSY      PORTACATH PLACEMENT Right 02/10/2020        Family History:   Family History   Adopted: Yes        Social History:   Social History     Tobacco Use    Smoking status: Every Day     Types: Vaping with nicotine    Smokeless tobacco: Former   Substance Use Topics    Alcohol use: Yes     Comment: occassionally    Drug use: Not Currently     Types: Marijuana        Allergies:   Review of patient's allergies  indicates:   Allergen Reactions    Metoclopramide hcl     Tramadol Anaphylaxis          Review of Systems   Review of Systems   See HPI for pertinent positives and negatives       Physical Exam     Vitals:    08/04/24 0406 08/04/24 0436 08/04/24 0506 08/04/24 0536   BP: (!) 191/107 (!) 188/103 (!) 204/116 (!) 171/96   Pulse: (!) 111 105 (!) 118 103   Resp: 20 16 (!) 36 18   Temp:       SpO2: (!) 92% 95% 95% (!) 93%   Weight:       Height:          Physical Exam  Vitals and nursing note reviewed.   Constitutional:       General: She is not in acute distress.     Appearance: Normal appearance. She is not ill-appearing.      Comments: Pleasantly intoxicated   HENT:      Head: Normocephalic and atraumatic.      Right Ear: External ear normal.      Left Ear: External ear normal.      Nose: Nose normal. No congestion or rhinorrhea.      Mouth/Throat:      Mouth: Mucous membranes are moist.   Eyes:      Conjunctiva/sclera: Conjunctivae normal.      Pupils: Pupils are equal, round, and reactive to light.   Pulmonary:      Effort: Pulmonary effort is normal. No respiratory distress.   Musculoskeletal:         General: No deformity. Normal range of motion.      Cervical back: Normal range of motion. No rigidity.   Skin:     General: Skin is dry.   Neurological:      General: No focal deficit present.      Mental Status: She is alert and oriented to person, place, and time. Mental status is at baseline.   Psychiatric:         Mood and Affect: Mood normal.              Diagnostic Study Results      Labs -   Recent Results (from the past 12 hour(s))   Basic Metabolic Panel    Collection Time: 08/04/24  1:26 AM   Result Value Ref Range    Sodium 148 (H) 136 - 145 mmol/L    Potassium 3.7 3.5 - 5.1 mmol/L    Chloride 107 95 - 110 mmol/L    CO2 23 23 - 29 mmol/L    Glucose 128 (H) 70 - 110 mg/dL    BUN 7 6 - 20 mg/dL    Creatinine 0.7 0.5 - 1.4 mg/dL    Calcium 9.1 8.7 - 10.5 mg/dL    Anion Gap 18 (H) 3 - 11 mmol/L    eGFR >60.0 >60  mL/min/1.73 m^2   CBC Auto Differential    Collection Time: 08/04/24  1:26 AM   Result Value Ref Range    WBC 11.46 3.90 - 12.70 K/uL    RBC 4.13 4.00 - 5.40 M/uL    Hemoglobin 13.2 12.0 - 16.0 g/dL    Hematocrit 38.2 37.0 - 48.5 %    MCV 93 82 - 98 fL    MCH 32.0 (H) 27.0 - 31.0 pg    MCHC 34.6 32.0 - 36.0 g/dL    RDW 12.5 11.5 - 14.5 %    Platelets 455 (H) 150 - 450 K/uL    MPV 9.8 9.2 - 12.9 fL    Immature Granulocytes 0.4 0.0 - 0.5 %    Gran # (ANC) 9.0 (H) 1.8 - 7.7 K/uL    Immature Grans (Abs) 0.05 (H) 0.00 - 0.04 K/uL    Lymph # 1.4 1.0 - 4.8 K/uL    Mono # 0.9 0.3 - 1.0 K/uL    Eos # 0.1 0.0 - 0.5 K/uL    Baso # 0.07 0.00 - 0.20 K/uL    nRBC 0 0 /100 WBC    Gran % 78.6 (H) 38.0 - 73.0 %    Lymph % 12.2 (L) 18.0 - 48.0 %    Mono % 7.4 4.0 - 15.0 %    Eosinophil % 0.8 0.0 - 8.0 %    Basophil % 0.6 0.0 - 1.9 %    Differential Method Automated    Ethanol    Collection Time: 08/04/24  1:26 AM   Result Value Ref Range    Alcohol, Serum 249 (H) <10 mg/dL   Magnesium    Collection Time: 08/04/24  1:26 AM   Result Value Ref Range    Magnesium 1.3 (L) 1.6 - 2.6 mg/dL        Radiologic Studies -    CT Head Without Contrast    (Results Pending)        Medications given in the ED-   Medications   sodium chloride 0.9% bolus 1,000 mL 1,000 mL (0 mLs Intravenous Stopped 8/4/24 2916)   ondansetron injection 4 mg (4 mg Intravenous Given 8/4/24 5421)   LORazepam injection 0.5 mg (0.5 mg Intravenous Given 8/4/24 0145)   acetaminophen tablet 1,000 mg (1,000 mg Oral Given 8/4/24 0142)   ondansetron injection 4 mg (4 mg Intravenous Given 8/4/24 0256)   famotidine (PF) injection 40 mg (40 mg Intravenous Given 8/4/24 0257)   LORazepam injection 1 mg (1 mg Intravenous Given 8/4/24 0326)   tiZANidine tablet 4 mg (4 mg Oral Given 8/4/24 0326)   magnesium oxide tablet 400 mg (400 mg Oral Given 8/4/24 0519)   ketorolac injection 15 mg (15 mg Intravenous Given 8/4/24 0519)           Medical Decision Making    I am the first provider for  this patient.     I reviewed the vital signs, available nursing notes, past medical history, past surgical history, family history and social history.     Vital Signs:  Reviewed the patient's vital signs.     Pulse Oximetry Analysis and Interpretation:    96% on Room Air, normal      External Test Results (Pertinent to encounter):    Records Reviewed: Nursing Notes, Current Prescription Medications, Old Medical Records, and External Medical Records     History Obtained By: Patient and Spouse    Provider Notes: Janis Reese is a 43 y.o. female with hypertension, dizziness, headache    Co-morbidities Considered:  MS, hypertension, alcohol use    Differential Diagnosis:  Alcohol intoxication, asymptomatic hypertension, hypertensive emergency      ED Course:    4:14 AM  Patient reports continuing headache.  Nausea has improved.  Will obtain CT head rule out SAH.  Patient presented with dizziness, headache, nausea in the setting of heavy alcohol use. No trauma. Patient has had some anxiety, now resting BP gradually improving.     5:34 AM  CT head negative for acute findings  Patient headache improved with Toradol. BP improving. Appropriate for discharge at this time in the care of her ,     ED Course as of 08/04/24 0544   Sun Aug 04, 2024   0145 Alcohol, Serum(!): 249 [MO]   0145 Creatinine: 0.7 [MO]      ED Course User Index  [MO] Joseph New MD       Problems Addressed:  Headache, alcohol intoxication    Procedures:   Procedures       Diagnosis and Disposition     Critical Care:      DISCHARGE NOTE:       Janis Reese's  results have been reviewed with her.  She has been counseled regarding her diagnosis, treatment, and plan.  She verbally conveys understanding and agreement of the signs, symptoms, diagnosis, treatment and prognosis and additionally agrees to follow up as discussed.  She also agrees with the care-plan and conveys that all of her questions have been answered.  I have also  provided discharge instructions for her that include: educational information regarding their diagnosis and treatment, and list of reasons why they would want to return to the ED prior to their follow-up appointment, should her condition change. She has been provided with education for proper emergency department utilization.         CLINICAL IMPRESSION:         1. Alcoholic intoxication without complication    2. Hypertension, unspecified type    3. Nonintractable headache, unspecified chronicity pattern, unspecified headache type              PLAN:   1. Discharge Home  2.      Medication List        START taking these medications      acetaminophen 500 MG tablet  Commonly known as: TYLENOL  Take 2 tablets (1,000 mg total) by mouth every 8 (eight) hours as needed for Pain or Temperature greater than (101).     ondansetron 4 MG Tbdl  Commonly known as: ZOFRAN-ODT  Take 1 tablet (4 mg total) by mouth every 8 (eight) hours as needed (Nasuea).            ASK your doctor about these medications      blood sugar diagnostic Strp  Commonly known as: TRUE METRIX GLUCOSE TEST STRIP  1 strip by Misc.(Non-Drug; Combo Route) route once daily.     blood-glucose meter kit  Commonly known as: TRUE METRIX GLUCOSE METER  Use as instructed     buprenorphine-naloxone 8-2 mg 8-2 mg  Commonly known as: SUBOXONE     buPROPion 300 MG 24 hr tablet  Commonly known as: WELLBUTRIN XL     cloNIDine 0.1 MG tablet  Commonly known as: CATAPRES     EScitalopram oxalate 10 MG tablet  Commonly known as: LEXAPRO     glimepiride 2 MG tablet  Commonly known as: AMARYL  Take 1 tablet (2 mg total) by mouth before breakfast.     KESIMPTA PEN 20 mg/0.4 mL Pnij  Generic drug: ofatumumab  Inject 20 mg into the skin every 28 days.     lamoTRIgine 100 MG tablet  Commonly known as: LAMICTAL     lancets 32 gauge Misc  1 lancet  by Misc.(Non-Drug; Combo Route) route once daily.     levothyroxine 50 MCG tablet  Commonly known as: SYNTHROID  Take 1 tablet (50 mcg  total) by mouth before breakfast.     losartan 50 MG tablet  Commonly known as: COZAAR  Take 1 tablet (50 mg total) by mouth once daily.     QUEtiapine 100 MG Tab  Commonly known as: SEROQUEL  Take 1 tablet (100 mg total) by mouth every evening.     tiZANidine 4 MG tablet  Commonly known as: ZANAFLEX     verapamiL 40 MG Tab  Commonly known as: CALAN               Where to Get Your Medications        These medications were sent to [facility][id number] - [address] AT [facility] [address]  [address] [facility] [address]      Phone: [phone]   acetaminophen 500 MG tablet  ondansetron 4 MG Tbdl        3. Rula Betancourt MD  [address] [facility]  [address] [place] [id number] [phone]    Schedule an appointment as soon as possible for a visit   Primary care follow up       _______________________________     Please note that this dictation was completed with [employer], the computer voice recognition software.  Quite often unanticipated grammatical, syntax, homophones, and other interpretive errors are inadvertently transcribed by the computer software.  Please disregard these errors.  Please excuse any errors that have escaped final proofreading.             Joseph New MD  08/04/24 0544

## 2024-08-05 ENCOUNTER — PATIENT MESSAGE (OUTPATIENT)
Dept: PSYCHIATRY | Facility: CLINIC | Age: 44
End: 2024-08-05
Payer: MEDICAID

## 2024-08-28 ENCOUNTER — OFFICE VISIT (OUTPATIENT)
Dept: UROLOGY | Facility: CLINIC | Age: 44
End: 2024-08-28
Payer: MEDICAID

## 2024-08-28 VITALS
WEIGHT: 140 LBS | SYSTOLIC BLOOD PRESSURE: 117 MMHG | BODY MASS INDEX: 27.48 KG/M2 | HEART RATE: 56 BPM | DIASTOLIC BLOOD PRESSURE: 83 MMHG | TEMPERATURE: 98 F | HEIGHT: 60 IN | RESPIRATION RATE: 18 BRPM | OXYGEN SATURATION: 97 %

## 2024-08-28 DIAGNOSIS — N31.9 NEUROGENIC BLADDER: ICD-10-CM

## 2024-08-28 DIAGNOSIS — N39.3 SUI (STRESS URINARY INCONTINENCE, FEMALE): Primary | ICD-10-CM

## 2024-08-28 LAB
BILIRUB SERPL-MCNC: NORMAL MG/DL
BLOOD URINE, POC: NORMAL
COLOR, POC UA: NORMAL
GLUCOSE UR QL STRIP: 100
KETONES UR QL STRIP: NEGATIVE
LEUKOCYTE ESTERASE URINE, POC: NORMAL
NITRITE, POC UA: NEGATIVE
PH, POC UA: 6.5
PROTEIN, POC: 300
SPECIFIC GRAVITY, POC UA: 1.03
UROBILINOGEN, POC UA: 2

## 2024-08-28 PROCEDURE — 3008F BODY MASS INDEX DOCD: CPT | Mod: CPTII,,, | Performed by: NURSE PRACTITIONER

## 2024-08-28 PROCEDURE — 1160F RVW MEDS BY RX/DR IN RCRD: CPT | Mod: CPTII,,, | Performed by: NURSE PRACTITIONER

## 2024-08-28 PROCEDURE — 87086 URINE CULTURE/COLONY COUNT: CPT | Performed by: NURSE PRACTITIONER

## 2024-08-28 PROCEDURE — 99204 OFFICE O/P NEW MOD 45 MIN: CPT | Mod: S$PBB,,, | Performed by: NURSE PRACTITIONER

## 2024-08-28 PROCEDURE — 99215 OFFICE O/P EST HI 40 MIN: CPT | Mod: PBBFAC | Performed by: NURSE PRACTITIONER

## 2024-08-28 PROCEDURE — 81001 URINALYSIS AUTO W/SCOPE: CPT | Mod: PBBFAC | Performed by: NURSE PRACTITIONER

## 2024-08-28 PROCEDURE — 4010F ACE/ARB THERAPY RXD/TAKEN: CPT | Mod: CPTII,,, | Performed by: NURSE PRACTITIONER

## 2024-08-28 PROCEDURE — 3079F DIAST BP 80-89 MM HG: CPT | Mod: CPTII,,, | Performed by: NURSE PRACTITIONER

## 2024-08-28 PROCEDURE — 3074F SYST BP LT 130 MM HG: CPT | Mod: CPTII,,, | Performed by: NURSE PRACTITIONER

## 2024-08-28 PROCEDURE — 1159F MED LIST DOCD IN RCRD: CPT | Mod: CPTII,,, | Performed by: NURSE PRACTITIONER

## 2024-08-28 RX ORDER — LUMATEPERONE 42 MG/1
42 CAPSULE ORAL
COMMUNITY

## 2024-08-28 RX ORDER — VIBEGRON 75 MG/1
75 TABLET, FILM COATED ORAL DAILY
Qty: 30 TABLET | Refills: 11 | Status: SHIPPED | OUTPATIENT
Start: 2024-08-28

## 2024-08-28 NOTE — PROGRESS NOTES
Chief Complaint:   Chief Complaint   Patient presents with    Neurogenic Bladder       HPI:   Patient a year female referred to neurogenic bladder due to multiple sclerosis.  Patient history multiple sclerosis patient has been wheelchair-bound for the past 2 years.  Has neurogenic bladder, requiring diapers for the past 1 year. Also has recurrent UTI.  Patient having increased episodes of stress urinary incontinence associated with laughing, coughing, straining for the past year she has been gone to 2-3 briefs per day.  Today patient's urine is dark and she has has a foul odor and feels burning with urination.    Allergies:  Review of patient's allergies indicates:   Allergen Reactions    Metoclopramide hcl     Tramadol Anaphylaxis       Medications:  Current Outpatient Medications   Medication Sig Dispense Refill    acetaminophen (TYLENOL) 500 MG tablet Take 2 tablets (1,000 mg total) by mouth every 8 (eight) hours as needed for Pain or Temperature greater than (101). 30 tablet 0    blood sugar diagnostic (TRUE METRIX GLUCOSE TEST STRIP) Strp 1 strip by Misc.(Non-Drug; Combo Route) route once daily. 90 strip 3    blood-glucose meter (TRUE METRIX GLUCOSE METER) kit Use as instructed 1 each 0    buprenorphine-naloxone 8-2 mg (SUBOXONE) 8-2 mg Place 1 each under the tongue 2 (two) times a day.      buPROPion (WELLBUTRIN XL) 300 MG 24 hr tablet Take 300 mg by mouth once daily.      glimepiride (AMARYL) 2 MG tablet Take 1 tablet (2 mg total) by mouth before breakfast. 90 tablet 3    KESIMPTA PEN 20 mg/0.4 mL PnIj Inject 20 mg into the skin every 28 days. 0.4 mL 5    lamoTRIgine (LAMICTAL) 100 MG tablet Take 100 mg by mouth. 1 tablet in the am and 1/2 tablet in the pm      lancets 32 gauge Misc 1 lancet  by Misc.(Non-Drug; Combo Route) route once daily. 100 each 3    levothyroxine (SYNTHROID) 50 MCG tablet Take 1 tablet (50 mcg total) by mouth before breakfast. 90 tablet 3    losartan (COZAAR) 50 MG tablet Take 1 tablet  (50 mg total) by mouth once daily. 90 tablet 3    lumateperone (CAPLYTA) 42 mg Cap Take 42 mg by mouth.      ondansetron (ZOFRAN-ODT) 4 MG TbDL Take 1 tablet (4 mg total) by mouth every 8 (eight) hours as needed ([place]). 8 tablet 0    QUEtiapine (SEROQUEL) 100 MG Tab Take 1 tablet (100 mg total) by mouth every evening.      tiZANidine (ZANAFLEX) 4 MG tablet Take 4 mg by mouth every 8 (eight) hours.      verapamiL (CALAN) 40 MG Tab Take 80 mg by mouth once daily. Pt doesn't know the strength.      cloNIDine (CATAPRES) 0.1 MG tablet Take 0.1 mg by mouth 3 (three) times daily as needed (withdrawal, anxiety, insomnia). (Patient not taking: Reported on 2024)      EScitalopram oxalate (LEXAPRO) 10 MG tablet Take 10 mg by mouth once daily. (Patient not taking: Reported on 2024)       Current Facility-Administered Medications   Medication Dose Route Frequency Provider Last Rate Last Admin    onabotulinumtoxina injection 800 Units  800 Units Intramuscular 1 time in Clinic/HOD            Review of Systems:  General: No fever, chills, fatigability, or weight loss.  Skin: No rashes, itching, or changes in color or texture of skin.  Chest: Denies SERRATO, cyanosis, wheezing, cough, and sputum production.  Abdomen: Appetite fine. No weight loss. Denies diarrhea, abdominal pain, hematemesis, or blood in stool.  Musculoskeletal: No joint stiffness or swelling. Denies back pain.  : As above.  All other review of systems negative.    PMH:  Past Medical History:   Diagnosis Date    Depression     Hypertension     Multiple sclerosis        PSH:  Past Surgical History:   Procedure Laterality Date     SECTION  2006    DILATION AND CURETTAGE OF UTERUS      LITHOTRIPSY      PORTACATH PLACEMENT Right 02/10/2020       FamHx:  Family History   Adopted: Yes       SocHx:  Social History     Socioeconomic History    Marital status:    Tobacco Use    Smoking status: Every Day     Types: Vaping with nicotine     Smokeless tobacco: Former   Substance and Sexual Activity    Alcohol use: Yes     Comment: occassionally    Drug use: Not Currently     Types: Marijuana   Social History Narrative    ** Merged History Encounter **            Physical Exam:  Vitals:    08/28/24 1512   BP: 117/83   Pulse: (!) 56   Resp: 18   Temp: 97.9 °F (36.6 °C)     General: A&Ox3, no apparent distress, no deformities  Neck: No masses, normal thyroid  Lungs: CTA tamar, no use of accessory muscles  Heart: RRR, no arrhythmias  Abdomen: Soft, NT, ND, no masses, no hernias, no hepatosplenomegaly  Lymphatic: Neck and groin nodes negative  Skin: The skin is warm and dry. No jaundice.  Ext: No c/c/e.        Urinalysis:  Component 15:16   Color, UA Brown   Spec Grav UA 1.030   pH, UA 6.5   WBC, UA Small   Nitrite, UA Negative   Protein,    Glucose,    Ketones, UA Negative   Urobilinogen, UA 2.0   Bilirubin, POC Small   Blood, UA Large              Specimen Collected: 08/28/24 15:16 CDT Last Resulted: 08/28/24 15:16 CDT         Microscopic urinalysis revealed large RBCs, small WBCs 20-30 per HPF, bacteria rare per HPF, nitrites negative.  Instructed patient will send urine for urinary culture and sensitivity and notify her of results when completed and treat accordingly.        Impression:  1. Neurogenic bladder  - Ambulatory referral/consult to Urology  - POCT URINE DIPSTICK WITH MICROSCOPE, AUTOMATED      Plan:  Instructed patient to start Gemtesa 75 mg p.o. daily.  Instructed patient will be notified of culture sensitivity when completed and treat accordingly.  Instructed patient on timed voiding every 2-3 hrs, double voiding, avoidance of bladder irritants such as alcohol, citrus foods, chocolate, caffeinated drinks, energy drinks, spicy foods, sugar, caffeine products, sodas. Instructed patient to avoid drinking fluids 1-2 hours prior to bedtime & void immediately before bedtime.   RTC one-month for re-evaluation with bladder  scan.  Instructed patient if develops any abnormal urologic symptoms notify clinic to be re-evaluate treated or during after hours go to emergency room versus urgent here.                           [facility]

## 2024-08-28 NOTE — PROGRESS NOTES
Patient seen by Ronnie Shaw NP. Patient instructed to RTC in 1 months with bladder scan. Patient will be prescribed Gemtesa.

## 2024-08-31 LAB — BACTERIA UR CULT: NORMAL

## 2024-09-18 ENCOUNTER — DOCUMENTATION ONLY (OUTPATIENT)
Dept: NEUROLOGY | Facility: CLINIC | Age: 44
End: 2024-09-18
Payer: MEDICAID

## 2024-09-19 ENCOUNTER — PROCEDURE VISIT (OUTPATIENT)
Dept: NEUROLOGY | Facility: CLINIC | Age: 44
End: 2024-09-19
Payer: MEDICAID

## 2024-09-19 VITALS
RESPIRATION RATE: 20 BRPM | WEIGHT: 140 LBS | BODY MASS INDEX: 27.48 KG/M2 | TEMPERATURE: 99 F | HEIGHT: 60 IN | DIASTOLIC BLOOD PRESSURE: 84 MMHG | OXYGEN SATURATION: 97 % | SYSTOLIC BLOOD PRESSURE: 125 MMHG | HEART RATE: 91 BPM

## 2024-09-19 DIAGNOSIS — K85.20 ALCOHOL-INDUCED ACUTE PANCREATITIS WITHOUT INFECTION OR NECROSIS: ICD-10-CM

## 2024-09-19 DIAGNOSIS — G82.20 SPASTIC DIPLEGIA, ACQUIRED, LOWER EXTREMITY: Primary | ICD-10-CM

## 2024-09-19 PROCEDURE — 64645 CHEMODENERV 1 EXTREM 5/> EA: CPT | Mod: PBBFAC | Performed by: PSYCHIATRY & NEUROLOGY

## 2024-09-19 PROCEDURE — 95874 GUIDE NERV DESTR NEEDLE EMG: CPT | Mod: PBBFAC | Performed by: PSYCHIATRY & NEUROLOGY

## 2024-09-19 PROCEDURE — 64642 CHEMODENERV 1 EXTREMITY 1-4: CPT | Mod: PBBFAC | Performed by: PSYCHIATRY & NEUROLOGY

## 2024-09-19 PROCEDURE — 64643 CHEMODENERV 1 EXTREM 1-4 EA: CPT | Mod: PBBFAC | Performed by: PSYCHIATRY & NEUROLOGY

## 2024-09-19 PROCEDURE — 64644 CHEMODENERV 1 EXTREM 5/> MUS: CPT | Mod: PBBFAC | Performed by: PSYCHIATRY & NEUROLOGY

## 2024-09-19 RX ORDER — HYDROCHLOROTHIAZIDE 25 MG/1
120 TABLET ORAL
COMMUNITY
Start: 2024-08-27

## 2024-09-19 RX ORDER — ONDANSETRON 4 MG/1
4 TABLET, ORALLY DISINTEGRATING ORAL EVERY 8 HOURS PRN
Qty: 60 TABLET | Refills: 0 | Status: SHIPPED | OUTPATIENT
Start: 2024-09-19 | End: 2024-10-19

## 2024-09-19 NOTE — PROCEDURES
[facility] Neurology Outpatient Botox Procedure Note    History of Present Illness     This is 44 y.o. 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 Patient is here for Botox for spasticity for spastic diplegia.     Review of System      reviewed. stable.    Focused Exam     Motor: increased left tone in LUE and bilateral LE, and bulk wnl throughout, no abnormal involuntary or voluntary movements, 5/5 to confrontation except for 4-/5 in LUE, 3/5 in L IP/EHL/TA, 4/5 Right IP, left pronator drift.     Assessment     This is 44 y.o. 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    Procedure     Date of procedure: 09/19/2024    Procedure: Bilateral Lower Extremity Chemodenervation with Botulinum toxin, 12 muscles, EMG guided    The patient was identified and informed consent was reviewed with the patient, and we discussed the risks, benefits and alternatives.  Specifically, we discussed the risks of bleeding, infection and nerve injury with worsened pain and function. Specifically, we discussed the most frequently reported adverse reactions following injection of BOTOX for chronic migraine include muscular weakness (4%), musculoskeletal stiffness (4%), bronchitis (3%), injection-site pain (3%), musculoskeletal pain (3%), myalgia (3%), hypertension (2%), and muscle spasms (2%). The patient verbalized an understanding of these risks and the symptoms and the potentially catastrophic consequences of this occurrence. The patient verbalized an understanding that if she should begin to have these symptoms that she should immediately go to the nearest emergency room for evaluation.  The patient was then positioned. The injection sites were  identified and was prepped with Alcohol. 8 cc of preservative free normal saline was mixed with 800 units of Botox. A 25-gauge, 1.5 inch needle was then used. Muscles injected as below. Injection site confirmed via EMG. Patient tolerated the procedure well with no complaints.    Left  Gastrocnemius Lateral Head: 2 sites, 50 units   Gastrocnemius Medial Head: 2 sites, 50 units  Soleus: 2 sites, 60 units  Tibialis Posterior: 1 sites, 40 units  Flexor Hallucis Longus: 1 site, 30 units   Flexor Digitorum Longus: 1 site, 30 units  Semitendenosis: 1 site, 20 units  Bicep Femoris short head: 1 site, 40 units     Right  Gastrocnemius Lateral Head: 2 sites, 40 units   Gastrocnemius Medial Head: 2 sites, 40 units  Soleus: 2 sites, 40 units  Tibialis Posterior: 2 sites, 50 units  Flexor Hallucis Longus: 1 site, 30 units   Flexor Digitorum Longus: 1 site, 30 units  Semitendenosis: 1 site, 20 units  Bicep Femoris short head: 1 site, 20 units    Total Units: 590 units  Total Muscles Injected: 16        Follow Up       RTC office visit      Keila Lewis MD   [facility] General Neurology

## 2024-09-23 ENCOUNTER — TELEPHONE (OUTPATIENT)
Dept: GASTROENTEROLOGY | Facility: CLINIC | Age: 44
End: 2024-09-23
Payer: MEDICAID

## 2024-09-23 DIAGNOSIS — N31.9 NEUROGENIC BLADDER: ICD-10-CM

## 2024-09-23 DIAGNOSIS — G35 MULTIPLE SCLEROSIS: Primary | ICD-10-CM

## 2024-09-23 NOTE — TELEPHONE ENCOUNTER
----- Message from Lebron Bonilla LPN sent at 9/23/2024 11:43 AM CDT -----  Regarding: FW: MRI    ----- Message -----  From: Mansi Stout  Sent: 9/23/2024  11:39 AM CDT  To: [facility] Neurology Clinical Support Staff  Subject: MRI                                              Pt called to see if order for  MRI can be transferred to [place] ( [facility]). Please advise    Pt # [phone]

## 2024-09-23 NOTE — TELEPHONE ENCOUNTER
Spoke with scheduling. MRI's would need to be reordered and [facility] would need to be selected as the location.  We do not have an option of selecting anything in [place]. I spoke with scheduling @ [facility]. She advised me to fax the orders and they will enter them.

## 2024-09-25 ENCOUNTER — TELEPHONE (OUTPATIENT)
Dept: GASTROENTEROLOGY | Facility: CLINIC | Age: 44
End: 2024-09-25
Payer: MEDICAID

## 2024-09-25 NOTE — TELEPHONE ENCOUNTER
----- Message from Lebron Bonilla LPN sent at 9/23/2024 11:43 AM CDT -----  Regarding: FW: MRI    ----- Message -----  From: Mansi Stout  Sent: 9/23/2024  11:39 AM CDT  To: [facility] Neurology Clinical Support Staff  Subject: MRI                                              Pt called to see if order for  MRI can be transferred to [place] ( [facility]). Please advise    Pt # [phone]

## 2024-09-30 NOTE — TELEPHONE ENCOUNTER
"C/o left arm and leg not working and is painful. Pt is sched for MRI on Oct 4, 2024. She is questioning if she needs steroids. States, " I think I have an active lesion. Dr. Acevedo would give me steroids when this happens. I have a port, so I usually do them at home. I will do whatever she wants."   "

## 2024-10-03 ENCOUNTER — TELEPHONE (OUTPATIENT)
Dept: GASTROENTEROLOGY | Facility: CLINIC | Age: 44
End: 2024-10-03
Payer: MEDICAID

## 2024-10-03 NOTE — TELEPHONE ENCOUNTER
----- Message from Nurse Diana sent at 10/3/2024 12:10 PM CDT -----  Regarding: FW: Prior Kelly    ----- Message -----  From: Mariya Ching  Sent: 10/3/2024  12:05 PM CDT  To: [facility] Neurology Clinical Support Staff  Subject: Prior Kelly Gravesine from [facility] is calling for a Prior Auth for MRI and Cervical Spine for the pt she is scheduled for tomorrow.     Callback [phone]

## 2024-10-04 ENCOUNTER — TELEPHONE (OUTPATIENT)
Dept: NEUROLOGY | Facility: CLINIC | Age: 44
End: 2024-10-04
Payer: MEDICAID

## 2024-10-08 ENCOUNTER — PATIENT MESSAGE (OUTPATIENT)
Dept: ADMINISTRATIVE | Facility: HOSPITAL | Age: 44
End: 2024-10-08
Payer: MEDICAID

## 2024-10-14 ENCOUNTER — OFFICE VISIT (OUTPATIENT)
Dept: UROLOGY | Facility: CLINIC | Age: 44
End: 2024-10-14
Payer: MEDICAID

## 2024-10-14 VITALS
RESPIRATION RATE: 20 BRPM | HEART RATE: 88 BPM | BODY MASS INDEX: 26.43 KG/M2 | DIASTOLIC BLOOD PRESSURE: 99 MMHG | TEMPERATURE: 99 F | WEIGHT: 140 LBS | HEIGHT: 61 IN | SYSTOLIC BLOOD PRESSURE: 152 MMHG

## 2024-10-14 DIAGNOSIS — N31.9 NEUROGENIC BLADDER: Primary | ICD-10-CM

## 2024-10-14 LAB — POC RESIDUAL URINE VOLUME: 11 ML (ref 0–100)

## 2024-10-14 PROCEDURE — 3077F SYST BP >= 140 MM HG: CPT | Mod: CPTII,,, | Performed by: NURSE PRACTITIONER

## 2024-10-14 PROCEDURE — 51798 US URINE CAPACITY MEASURE: CPT | Mod: PBBFAC | Performed by: NURSE PRACTITIONER

## 2024-10-14 PROCEDURE — 3008F BODY MASS INDEX DOCD: CPT | Mod: CPTII,,, | Performed by: NURSE PRACTITIONER

## 2024-10-14 PROCEDURE — 1160F RVW MEDS BY RX/DR IN RCRD: CPT | Mod: CPTII,,, | Performed by: NURSE PRACTITIONER

## 2024-10-14 PROCEDURE — 4010F ACE/ARB THERAPY RXD/TAKEN: CPT | Mod: CPTII,,, | Performed by: NURSE PRACTITIONER

## 2024-10-14 PROCEDURE — 99213 OFFICE O/P EST LOW 20 MIN: CPT | Mod: S$PBB,,, | Performed by: NURSE PRACTITIONER

## 2024-10-14 PROCEDURE — 3080F DIAST BP >= 90 MM HG: CPT | Mod: CPTII,,, | Performed by: NURSE PRACTITIONER

## 2024-10-14 PROCEDURE — 99215 OFFICE O/P EST HI 40 MIN: CPT | Mod: PBBFAC | Performed by: NURSE PRACTITIONER

## 2024-10-14 PROCEDURE — 1159F MED LIST DOCD IN RCRD: CPT | Mod: CPTII,,, | Performed by: NURSE PRACTITIONER

## 2024-10-14 RX ORDER — MIRABEGRON 50 MG/1
50 TABLET, EXTENDED RELEASE ORAL DAILY
Qty: 30 TABLET | Refills: 11 | Status: SHIPPED | OUTPATIENT
Start: 2024-10-14 | End: 2025-10-14

## 2024-10-14 NOTE — PROGRESS NOTES
Chief Complaint: No chief complaint on file.      HPI:   Patient a year female referred to neurogenic bladder due to multiple sclerosis.  Patient history multiple sclerosis patient has been wheelchair-bound for the past 2 years.  Patient has a history of neurogenic bladder, requiring diapers for the past 1 year. Also has recurrent UTI.  Patient having increased episodes of stress urinary incontinence associated with laughing, coughing, straining for the past year she has been gone to 2-3 briefs per day.  Today patient complains of persistent stress urinary incontinence due to she had to stop Gemtesa because it was causing urinary retention.  Instructed patient to start Myrbetriq 50 mg p.o. daily RTC one-month for re-evaluation.  Bladder scan 11 mL.      Allergies:  Review of patient's allergies indicates:   Allergen Reactions    Metoclopramide hcl     Tramadol Anaphylaxis       Medications:  Current Outpatient Medications   Medication Sig Dispense Refill    blood sugar diagnostic (TRUE METRIX GLUCOSE TEST STRIP) Strp 1 strip by Misc.(Non-Drug; Combo Route) route once daily. 90 strip 3    blood-glucose meter (TRUE METRIX GLUCOSE METER) kit Use as instructed 1 each 0    buprenorphine-naloxone 8-2 mg (SUBOXONE) 8-2 mg Place 1 each under the tongue 2 (two) times a day.      buPROPion (WELLBUTRIN XL) 300 MG 24 hr tablet Take 300 mg by mouth once daily.      cloNIDine (CATAPRES) 0.1 MG tablet Take 0.1 mg by mouth 3 (three) times daily as needed (withdrawal, anxiety, insomnia).      glimepiride (AMARYL) 2 MG tablet Take 1 tablet (2 mg total) by mouth before breakfast. 90 tablet 3    KESIMPTA PEN 20 mg/0.4 mL PnIj Inject 20 mg into the skin every 28 days. 0.4 mL 5    lamoTRIgine (LAMICTAL) 100 MG tablet Take 100 mg by mouth. 1 tablet in the am and 1/2 tablet in the pm      lancets 32 gauge Misc 1 lancet  by Misc.(Non-Drug; Combo Route) route once daily. 100 each 3    levothyroxine (SYNTHROID) 50 MCG tablet Take 1 tablet (50  mcg total) by mouth before breakfast. 90 tablet 3    losartan (COZAAR) 50 MG tablet Take 1 tablet (50 mg total) by mouth once daily. 90 tablet 3    lumateperone (CAPLYTA) 42 mg Cap Take 42 mg by mouth.      ondansetron (ZOFRAN-ODT) 4 MG TbDL Take 1 tablet (4 mg total) by mouth every 8 (eight) hours as needed (Nausea). 60 tablet 0    tiZANidine (ZANAFLEX) 4 MG tablet Take 4 mg by mouth every 8 (eight) hours.      verapamiL (CALAN-SR) 120 MG CR tablet Take 120 mg by mouth.      vibegron (GEMTESA) 75 mg Tab Take 1 tablet (75 mg total) by mouth once daily. 30 tablet 11     Current Facility-Administered Medications   Medication Dose Route Frequency Provider Last Rate Last Admin    onabotulinumtoxina injection 800 Units  800 Units Intramuscular 1 time in Clinic/HOD            Review of Systems:  General: No fever, chills, fatigability, or weight loss.  Skin: No rashes, itching, or changes in color or texture of skin.  Chest: Denies SERRATO, cyanosis, wheezing, cough, and sputum production.  Abdomen: Appetite fine. No weight loss. Denies diarrhea, abdominal pain, hematemesis, or blood in stool.  Musculoskeletal: No joint stiffness or swelling. Denies back pain.  : As above.  All other review of systems negative.    PMH:  Past Medical History:   Diagnosis Date    Depression     Hypertension     Multiple sclerosis        PSH:  Past Surgical History:   Procedure Laterality Date     SECTION  2006    DILATION AND CURETTAGE OF UTERUS      LITHOTRIPSY      PORTACATH PLACEMENT Right 02/10/2020       FamHx:  Family History   Adopted: Yes       SocHx:  Social History     Socioeconomic History    Marital status:    Tobacco Use    Smoking status: Every Day     Types: Vaping with nicotine    Smokeless tobacco: Former   Substance and Sexual Activity    Alcohol use: Yes     Comment: occassionally    Drug use: Not Currently    Sexual activity: Not Currently   Social History Narrative    ** Merged History Encounter **             Physical Exam:  There were no vitals filed for this visit.  General: A&Ox3, no apparent distress, no deformities  Neck: No masses, normal thyroid  Lungs: CTA tamar, no use of accessory muscles  Heart: RRR, no arrhythmias  Abdomen: Soft, NT, ND, no masses, no hernias, no hepatosplenomegaly  Lymphatic: Neck and groin nodes negative  Skin: The skin is warm and dry. No jaundice.  Ext: No c/c/e.      Impression:  Stress urinary incontinence    Plan:  Instructed patient to discontinue Gemtesa and start Myrbetriq 50 mg p.o. daily.  Instructed patient on timed voiding every 2-3 hrs, double voiding, avoidance of bladder irritants such as alcohol, citrus foods, chocolate, caffeinated drinks, energy drinks, spicy foods, sugar, caffeine products, sodas. Instructed patient to avoid drinking fluids 1-2 hours prior to bedtime & void immediately before bedtime.   RTC one-month for re-evaluation.  Instructed patient if develops any abnormal urologic symptoms notify clinic to be re-evaluate treated or during after hours go to emergency room versus urgent here.                           [facility]

## 2024-10-18 ENCOUNTER — PATIENT MESSAGE (OUTPATIENT)
Dept: NEUROLOGY | Facility: CLINIC | Age: 44
End: 2024-10-18
Payer: MEDICAID

## 2024-10-22 ENCOUNTER — TELEPHONE (OUTPATIENT)
Dept: NEUROLOGY | Facility: CLINIC | Age: 44
End: 2024-10-22
Payer: MEDICAID

## 2024-10-22 NOTE — TELEPHONE ENCOUNTER
----- Message from Mariya sent at 10/22/2024  2:37 PM CDT -----  Pt husbands calling to speak to the nurse about his wife.     Callback - [phone]

## 2024-10-22 NOTE — TELEPHONE ENCOUNTER
Spoke with patient's  per her permission with patient present due to her needing his assistance in explaining her situation.     They believe she is having an exacerbation. She c/o severe vertigo. States last two months unable to use left arm, can hardly move legs, has been bed bound recently. Requires 's assistance to place her in wheelchair for transport.     Also c/o severe leg pain, fatigue.    Please advise.

## 2024-11-19 ENCOUNTER — OFFICE VISIT (OUTPATIENT)
Dept: PRIMARY CARE CLINIC | Facility: CLINIC | Age: 44
End: 2024-11-19
Payer: MEDICAID

## 2024-11-19 VITALS
WEIGHT: 140 LBS | SYSTOLIC BLOOD PRESSURE: 109 MMHG | HEIGHT: 61 IN | BODY MASS INDEX: 26.43 KG/M2 | RESPIRATION RATE: 16 BRPM | HEART RATE: 80 BPM | DIASTOLIC BLOOD PRESSURE: 76 MMHG | OXYGEN SATURATION: 98 % | TEMPERATURE: 99 F

## 2024-11-19 DIAGNOSIS — E11.9 TYPE 2 DIABETES MELLITUS WITHOUT COMPLICATION, WITHOUT LONG-TERM CURRENT USE OF INSULIN: Primary | ICD-10-CM

## 2024-11-19 DIAGNOSIS — G35 MULTIPLE SCLEROSIS: ICD-10-CM

## 2024-11-19 DIAGNOSIS — E83.42 HYPOMAGNESEMIA: ICD-10-CM

## 2024-11-19 DIAGNOSIS — Z12.31 BREAST CANCER SCREENING BY MAMMOGRAM: ICD-10-CM

## 2024-11-19 DIAGNOSIS — E55.9 VITAMIN D DEFICIENCY: ICD-10-CM

## 2024-11-19 PROBLEM — Z87.19 HISTORY OF PANCREATITIS: Status: ACTIVE | Noted: 2021-08-03

## 2024-11-19 LAB — GLUCOSE SERPL-MCNC: 77 MG/DL (ref 70–110)

## 2024-11-19 PROCEDURE — 1159F MED LIST DOCD IN RCRD: CPT | Mod: CPTII,,, | Performed by: INTERNAL MEDICINE

## 2024-11-19 PROCEDURE — 99214 OFFICE O/P EST MOD 30 MIN: CPT | Mod: S$PBB,,, | Performed by: INTERNAL MEDICINE

## 2024-11-19 PROCEDURE — 1160F RVW MEDS BY RX/DR IN RCRD: CPT | Mod: CPTII,,, | Performed by: INTERNAL MEDICINE

## 2024-11-19 PROCEDURE — 3074F SYST BP LT 130 MM HG: CPT | Mod: CPTII,,, | Performed by: INTERNAL MEDICINE

## 2024-11-19 PROCEDURE — 4010F ACE/ARB THERAPY RXD/TAKEN: CPT | Mod: CPTII,,, | Performed by: INTERNAL MEDICINE

## 2024-11-19 PROCEDURE — 3008F BODY MASS INDEX DOCD: CPT | Mod: CPTII,,, | Performed by: INTERNAL MEDICINE

## 2024-11-19 PROCEDURE — 3078F DIAST BP <80 MM HG: CPT | Mod: CPTII,,, | Performed by: INTERNAL MEDICINE

## 2024-11-19 RX ORDER — GLIMEPIRIDE 1 MG/1
1 TABLET ORAL
Qty: 90 TABLET | Refills: 3 | Status: SHIPPED | OUTPATIENT
Start: 2024-11-19 | End: 2025-11-19

## 2024-11-19 NOTE — PROGRESS NOTES
Subjective:      Patient ID: Janis Reese is a 44 y.o. female.    Chief Complaint: Hypertension (F/u ) and Diabetes (F/u )    :  With multiple sclerosis diagnosed at age of 22.  Patient is being followed by Dr. Lewis.  Patient has bilateral leg weakness and diffuse muscle spasm/pain. Patient is on Kesimpta and is also receiving Botox injection bilateral legs.  Patient is also receiving Suboxone prescribed by psychiatrist.  Patient also has bipolar disorder and is on multiple medication and reports symptoms are under good control.     Patient has diabetes with last A1c of 7.1 more than a year ago.  Patient reports home blood sugars are monitored and are running in 70s fasting and nonfasting.  Patient reports rarely having hypoglycemic episodes.  Patient denies polyuria polydipsia but has numbness in hands and feet (probably secondary to multiple sclerosis) patient also has hypertension and blood pressure seem to be under good control.  Patient is on verapamil and that is helping with hypertension and tachycardia.  Patient is also on losartan.  Patient also has hypothyroidism with last TSH was at goal.  Patient reports compliance with levothyroxine.      Review of Systems   Constitutional:  Negative for chills, diaphoresis, fever, malaise/fatigue and weight loss.   HENT:  Negative for congestion, ear pain, sinus pain, sore throat and tinnitus.    Eyes:  Negative for blurred vision and photophobia.   Respiratory:  Negative for cough, hemoptysis, shortness of breath and wheezing.    Cardiovascular:  Negative for chest pain, palpitations, orthopnea, leg swelling and PND.   Gastrointestinal:  Negative for abdominal pain, blood in stool, constipation, diarrhea, heartburn, melena, nausea and vomiting.   Genitourinary:  Negative for dysuria, frequency and urgency.   Musculoskeletal:  Positive for myalgias (Muscle cramps). Negative for back pain and neck pain.   Skin:  Negative for rash.   Neurological:  Positive for  "tingling and weakness (Bilateral leg and left arm weakness). Negative for dizziness, tremors, seizures and loss of consciousness.   Endo/Heme/Allergies:  Negative for polydipsia.   Psychiatric/Behavioral:  Negative for depression and hallucinations. The patient does not have insomnia.      Objective:   /76 (BP Location: Right arm, Patient Position: Sitting)   Pulse 80   Temp 99 °F (37.2 °C) (Oral)   Resp 16   Ht 5' 1" (1.549 m)   Wt 63.5 kg (140 lb)   SpO2 98%   BMI 26.45 kg/m²   Physical Exam  Constitutional:       General: She is not in acute distress.     Appearance: She is not diaphoretic.   Neck:      Thyroid: No thyromegaly.   Cardiovascular:      Rate and Rhythm: Normal rate and regular rhythm.      Heart sounds: Normal heart sounds. No murmur heard.  Pulmonary:      Effort: Pulmonary effort is normal. No respiratory distress.      Breath sounds: Normal breath sounds. No wheezing.   Abdominal:      General: Bowel sounds are normal. There is no distension.      Palpations: Abdomen is soft.      Tenderness: There is no abdominal tenderness.   Lymphadenopathy:      Cervical: No cervical adenopathy.   Skin:     Comments: 1 x 3 inches lipoma noted on 9th and 10th rib on left side in mid axillary line   Neurological:      Mental Status: She is alert and oriented to person, place, and time.      Comments: Bilateral leg strength is 1/5   Left arm strength is 1/5  Right arm strength is 3/5   Psychiatric:         Behavior: Behavior normal.         Thought Content: Thought content normal.         Judgment: Judgment normal.           ICD-10-CM ICD-9-CM   1. Type 2 diabetes mellitus without complication, without long-term current use of insulin  E11.9 250.00   2. Breast cancer screening by mammogram  Z12.31 V76.12   3. Hypomagnesemia  E83.42 275.2   4. Vitamin D deficiency  E55.9 268.9   5. Multiple sclerosis  G35 340       Plan:   Patient with diabetes with last A1c of 7.1 was slightly high  Patient home " blood sugars are running on lower side  Will decrease glimepiride to 1 mg  Patient has hypertension and blood pressure seem under okay control.   Will continue medication  Patient has multiple sclerosis and is being followed by neurologist.   Advised patient to keep a appointments.  Patient has bilateral leg weakness and left arm weakness.  Will refer to home health for physical and occupational therapy  Will order annual labs.  Will order mammogram  Patient lab in outside facility suggested vitamin-D deficiency and low magnesium.  Will check levels again.  Patient has bipolar disorder and is being followed by psychiatrist.  Advised patient to keep appointment.  Patient has history of alcohol abuse and reports abstinence from alcohol use.  Patient is encouraged to avoid alcohol consumption.    Medication List with Changes/Refills   Current Medications    BLOOD SUGAR DIAGNOSTIC (TRUE METRIX GLUCOSE TEST STRIP) STRP    1 strip by Misc.(Non-Drug; Combo Route) route once daily.    BLOOD-GLUCOSE METER (TRUE METRIX GLUCOSE METER) KIT    Use as instructed    BUPRENORPHINE-NALOXONE 8-2 MG (SUBOXONE) 8-2 MG    Place 1 each under the tongue 2 (two) times a day.    BUPROPION (WELLBUTRIN XL) 300 MG 24 HR TABLET    Take 300 mg by mouth once daily.    CLONIDINE (CATAPRES) 0.1 MG TABLET    Take 0.1 mg by mouth 3 (three) times daily as needed (withdrawal, anxiety, insomnia).    KESIMPTA PEN 20 MG/0.4 ML PNIJ    Inject 20 mg into the skin every 28 days.    LAMOTRIGINE (LAMICTAL) 100 MG TABLET    Take 100 mg by mouth. 1 tablet in the am and 1/2 tablet in the pm    LANCETS 32 GAUGE MISC    1 lancet  by Misc.(Non-Drug; Combo Route) route once daily.    LEVOTHYROXINE (SYNTHROID) 50 MCG TABLET    Take 1 tablet (50 mcg total) by mouth before breakfast.    LOSARTAN (COZAAR) 50 MG TABLET    Take 1 tablet (50 mg total) by mouth once daily.    LUMATEPERONE (CAPLYTA) 42 MG CAP    Take 42 mg by mouth.    MIRABEGRON (MYRBETRIQ) 50 MG TB24     Take 1 tablet (50 mg total) by mouth once daily.    TIZANIDINE (ZANAFLEX) 4 MG TABLET    Take 4 mg by mouth every 8 (eight) hours.    VERAPAMIL (CALAN-SR) 120 MG CR TABLET    Take 120 mg by mouth.   Changed and/or Refilled Medications    Modified Medication Previous Medication    GLIMEPIRIDE (AMARYL) 1 MG TABLET glimepiride (AMARYL) 2 MG tablet       Take 1 tablet (1 mg total) by mouth before breakfast.    Take 1 tablet (2 mg total) by mouth before breakfast.

## 2024-11-26 RX ORDER — HYDROCHLOROTHIAZIDE 25 MG/1
120 TABLET ORAL NIGHTLY
Qty: 30 TABLET | Refills: 3 | Status: SHIPPED | OUTPATIENT
Start: 2024-11-26

## 2024-12-16 ENCOUNTER — PATIENT MESSAGE (OUTPATIENT)
Dept: PSYCHIATRY | Facility: CLINIC | Age: 44
End: 2024-12-16
Payer: MEDICAID

## 2024-12-23 ENCOUNTER — OFFICE VISIT (OUTPATIENT)
Dept: UROLOGY | Facility: CLINIC | Age: 44
End: 2024-12-23
Payer: MEDICAID

## 2024-12-23 ENCOUNTER — HOSPITAL ENCOUNTER (EMERGENCY)
Facility: HOSPITAL | Age: 44
Discharge: ANOTHER HOSPITAL | End: 2024-12-23
Attending: EMERGENCY MEDICINE
Payer: MEDICAID

## 2024-12-23 VITALS
RESPIRATION RATE: 18 BRPM | BODY MASS INDEX: 25.76 KG/M2 | HEART RATE: 77 BPM | WEIGHT: 140 LBS | TEMPERATURE: 98 F | SYSTOLIC BLOOD PRESSURE: 118 MMHG | HEIGHT: 62 IN | DIASTOLIC BLOOD PRESSURE: 87 MMHG | OXYGEN SATURATION: 98 %

## 2024-12-23 VITALS
OXYGEN SATURATION: 97 % | HEART RATE: 66 BPM | WEIGHT: 140 LBS | DIASTOLIC BLOOD PRESSURE: 93 MMHG | SYSTOLIC BLOOD PRESSURE: 138 MMHG | HEIGHT: 61 IN | RESPIRATION RATE: 18 BRPM | BODY MASS INDEX: 26.43 KG/M2 | TEMPERATURE: 98 F

## 2024-12-23 DIAGNOSIS — R52 UNCONTROLLED PAIN: ICD-10-CM

## 2024-12-23 DIAGNOSIS — N13.30 HYDRONEPHROSIS, UNSPECIFIED HYDRONEPHROSIS TYPE: ICD-10-CM

## 2024-12-23 DIAGNOSIS — E87.6 HYPOKALEMIA: ICD-10-CM

## 2024-12-23 DIAGNOSIS — N20.0 KIDNEY STONE: Primary | ICD-10-CM

## 2024-12-23 DIAGNOSIS — N20.1 LEFT URETERAL STONE: Primary | ICD-10-CM

## 2024-12-23 LAB
ALBUMIN SERPL-MCNC: 3.2 G/DL (ref 3.5–5)
ALBUMIN/GLOB SERPL: 1 RATIO (ref 1.1–2)
ALP SERPL-CCNC: 114 UNIT/L (ref 40–150)
ALT SERPL-CCNC: 52 UNIT/L (ref 0–55)
ANION GAP SERPL CALC-SCNC: 5 MEQ/L
AST SERPL-CCNC: 55 UNIT/L (ref 5–34)
BACTERIA #/AREA URNS AUTO: ABNORMAL /HPF
BASOPHILS # BLD AUTO: 0.04 X10(3)/MCL
BASOPHILS NFR BLD AUTO: 0.5 %
BILIRUB SERPL-MCNC: 0.9 MG/DL
BILIRUB UR QL STRIP.AUTO: ABNORMAL
BUN SERPL-MCNC: 6.5 MG/DL (ref 7–18.7)
CALCIUM SERPL-MCNC: 9.9 MG/DL (ref 8.4–10.2)
CHLORIDE SERPL-SCNC: 108 MMOL/L (ref 98–107)
CLARITY UR: ABNORMAL
CO2 SERPL-SCNC: 26 MMOL/L (ref 22–29)
COLOR UR AUTO: YELLOW
CREAT SERPL-MCNC: 0.67 MG/DL (ref 0.55–1.02)
CREAT/UREA NIT SERPL: 10
EOSINOPHIL # BLD AUTO: 0.07 X10(3)/MCL (ref 0–0.9)
EOSINOPHIL NFR BLD AUTO: 0.9 %
ERYTHROCYTE [DISTWIDTH] IN BLOOD BY AUTOMATED COUNT: 13 % (ref 11.5–17)
GFR SERPLBLD CREATININE-BSD FMLA CKD-EPI: >60 ML/MIN/1.73/M2
GLOBULIN SER-MCNC: 3.2 GM/DL (ref 2.4–3.5)
GLUCOSE SERPL-MCNC: 184 MG/DL (ref 74–100)
GLUCOSE UR QL STRIP: NORMAL
HCT VFR BLD AUTO: 38.8 % (ref 37–47)
HGB BLD-MCNC: 12.7 G/DL (ref 12–16)
HGB UR QL STRIP: ABNORMAL
HYALINE CASTS #/AREA URNS LPF: ABNORMAL /LPF
IMM GRANULOCYTES # BLD AUTO: 0.04 X10(3)/MCL (ref 0–0.04)
IMM GRANULOCYTES NFR BLD AUTO: 0.5 %
KETONES UR QL STRIP: ABNORMAL
LEUKOCYTE ESTERASE UR QL STRIP: 75
LIPASE SERPL-CCNC: 4 U/L
LYMPHOCYTES # BLD AUTO: 0.84 X10(3)/MCL (ref 0.6–4.6)
LYMPHOCYTES NFR BLD AUTO: 10.5 %
MAGNESIUM SERPL-MCNC: 2 MG/DL (ref 1.6–2.6)
MCH RBC QN AUTO: 33 PG (ref 27–31)
MCHC RBC AUTO-ENTMCNC: 32.7 G/DL (ref 33–36)
MCV RBC AUTO: 100.8 FL (ref 80–94)
MONOCYTES # BLD AUTO: 0.64 X10(3)/MCL (ref 0.1–1.3)
MONOCYTES NFR BLD AUTO: 8 %
MUCOUS THREADS URNS QL MICRO: ABNORMAL /LPF
NEUTROPHILS # BLD AUTO: 6.36 X10(3)/MCL (ref 2.1–9.2)
NEUTROPHILS NFR BLD AUTO: 79.6 %
NITRITE UR QL STRIP: NEGATIVE
NRBC BLD AUTO-RTO: 0 %
PH UR STRIP: 6 [PH]
PLATELET # BLD AUTO: 286 X10(3)/MCL (ref 130–400)
PMV BLD AUTO: 11.1 FL (ref 7.4–10.4)
POTASSIUM SERPL-SCNC: 3.1 MMOL/L (ref 3.5–5.1)
PROT SERPL-MCNC: 6.4 GM/DL (ref 6.4–8.3)
PROT UR QL STRIP: ABNORMAL
RBC # BLD AUTO: 3.85 X10(6)/MCL (ref 4.2–5.4)
RBC #/AREA URNS AUTO: >100 /HPF
SODIUM SERPL-SCNC: 139 MMOL/L (ref 136–145)
SP GR UR STRIP.AUTO: 1.02 (ref 1–1.03)
SQUAMOUS #/AREA URNS LPF: ABNORMAL /HPF
UROBILINOGEN UR STRIP-ACNC: ABNORMAL
WBC # BLD AUTO: 7.99 X10(3)/MCL (ref 4.5–11.5)
WBC #/AREA URNS AUTO: ABNORMAL /HPF

## 2024-12-23 PROCEDURE — 3008F BODY MASS INDEX DOCD: CPT | Mod: CPTII,,, | Performed by: NURSE PRACTITIONER

## 2024-12-23 PROCEDURE — 80053 COMPREHEN METABOLIC PANEL: CPT | Performed by: PHYSICIAN ASSISTANT

## 2024-12-23 PROCEDURE — 83735 ASSAY OF MAGNESIUM: CPT | Performed by: PHYSICIAN ASSISTANT

## 2024-12-23 PROCEDURE — 4010F ACE/ARB THERAPY RXD/TAKEN: CPT | Mod: CPTII,,, | Performed by: NURSE PRACTITIONER

## 2024-12-23 PROCEDURE — 1160F RVW MEDS BY RX/DR IN RCRD: CPT | Mod: CPTII,,, | Performed by: NURSE PRACTITIONER

## 2024-12-23 PROCEDURE — 63600175 PHARM REV CODE 636 W HCPCS: Performed by: PHYSICIAN ASSISTANT

## 2024-12-23 PROCEDURE — 99285 EMERGENCY DEPT VISIT HI MDM: CPT | Mod: 25,27

## 2024-12-23 PROCEDURE — 25000003 PHARM REV CODE 250: Performed by: PHYSICIAN ASSISTANT

## 2024-12-23 PROCEDURE — 3075F SYST BP GE 130 - 139MM HG: CPT | Mod: CPTII,,, | Performed by: NURSE PRACTITIONER

## 2024-12-23 PROCEDURE — 3080F DIAST BP >= 90 MM HG: CPT | Mod: CPTII,,, | Performed by: NURSE PRACTITIONER

## 2024-12-23 PROCEDURE — 99214 OFFICE O/P EST MOD 30 MIN: CPT | Mod: S$PBB,,, | Performed by: NURSE PRACTITIONER

## 2024-12-23 PROCEDURE — 96372 THER/PROPH/DIAG INJ SC/IM: CPT | Performed by: PHYSICIAN ASSISTANT

## 2024-12-23 PROCEDURE — 81001 URINALYSIS AUTO W/SCOPE: CPT | Performed by: PHYSICIAN ASSISTANT

## 2024-12-23 PROCEDURE — 1159F MED LIST DOCD IN RCRD: CPT | Mod: CPTII,,, | Performed by: NURSE PRACTITIONER

## 2024-12-23 PROCEDURE — 87086 URINE CULTURE/COLONY COUNT: CPT | Performed by: PHYSICIAN ASSISTANT

## 2024-12-23 PROCEDURE — 85025 COMPLETE CBC W/AUTO DIFF WBC: CPT | Performed by: PHYSICIAN ASSISTANT

## 2024-12-23 PROCEDURE — 83690 ASSAY OF LIPASE: CPT | Performed by: PHYSICIAN ASSISTANT

## 2024-12-23 PROCEDURE — 96374 THER/PROPH/DIAG INJ IV PUSH: CPT

## 2024-12-23 PROCEDURE — 99214 OFFICE O/P EST MOD 30 MIN: CPT | Mod: PBBFAC | Performed by: NURSE PRACTITIONER

## 2024-12-23 PROCEDURE — 25000003 PHARM REV CODE 250: Performed by: EMERGENCY MEDICINE

## 2024-12-23 RX ORDER — NITROFURANTOIN 25; 75 MG/1; MG/1
100 CAPSULE ORAL
COMMUNITY

## 2024-12-23 RX ORDER — SODIUM CHLORIDE 9 MG/ML
1000 INJECTION, SOLUTION INTRAVENOUS
Status: COMPLETED | OUTPATIENT
Start: 2024-12-23 | End: 2024-12-23

## 2024-12-23 RX ORDER — HYDROMORPHONE HYDROCHLORIDE 2 MG/1
2 TABLET ORAL
Status: COMPLETED | OUTPATIENT
Start: 2024-12-23 | End: 2024-12-23

## 2024-12-23 RX ORDER — KETOROLAC TROMETHAMINE 30 MG/ML
30 INJECTION, SOLUTION INTRAMUSCULAR; INTRAVENOUS
Status: COMPLETED | OUTPATIENT
Start: 2024-12-23 | End: 2024-12-23

## 2024-12-23 RX ORDER — LORAZEPAM 1 MG/1
1 TABLET ORAL
Status: COMPLETED | OUTPATIENT
Start: 2024-12-23 | End: 2024-12-23

## 2024-12-23 RX ORDER — ESCITALOPRAM OXALATE 10 MG/1
10 TABLET ORAL DAILY
COMMUNITY

## 2024-12-23 RX ORDER — TAMSULOSIN HYDROCHLORIDE 0.4 MG/1
0.4 CAPSULE ORAL
Status: COMPLETED | OUTPATIENT
Start: 2024-12-23 | End: 2024-12-23

## 2024-12-23 RX ORDER — CEFTRIAXONE 1 G/1
1 INJECTION, POWDER, FOR SOLUTION INTRAMUSCULAR; INTRAVENOUS
Status: COMPLETED | OUTPATIENT
Start: 2024-12-23 | End: 2024-12-23

## 2024-12-23 RX ADMIN — POTASSIUM BICARBONATE 40 MEQ: 391 TABLET, EFFERVESCENT ORAL at 04:12

## 2024-12-23 RX ADMIN — TAMSULOSIN HYDROCHLORIDE 0.4 MG: 0.4 CAPSULE ORAL at 08:12

## 2024-12-23 RX ADMIN — LORAZEPAM 1 MG: 1 TABLET ORAL at 08:12

## 2024-12-23 RX ADMIN — KETOROLAC TROMETHAMINE 30 MG: 30 INJECTION, SOLUTION INTRAMUSCULAR; INTRAVENOUS at 03:12

## 2024-12-23 RX ADMIN — HYDROMORPHONE HYDROCHLORIDE 2 MG: 2 TABLET ORAL at 05:12

## 2024-12-23 RX ADMIN — CEFTRIAXONE SODIUM 1 G: 1 INJECTION, POWDER, FOR SOLUTION INTRAMUSCULAR; INTRAVENOUS at 05:12

## 2024-12-23 RX ADMIN — SODIUM CHLORIDE 1000 ML: 9 INJECTION, SOLUTION INTRAVENOUS at 05:12

## 2024-12-23 NOTE — ED PROVIDER NOTES
Encounter Date: 2024     History     Chief Complaint   Patient presents with    Flank Pain     Left sided flank pain since . Recent dx of kidney stones at [facility]. States she is on suboxone.      Patient with pmhx of HTN, DM, MS, and recently dx ureteral stone presents today from urology clinic for repeat evaluation of stone. Patient was seen at [facility] in [place] 1 week ago and was dx with a 15mm left UPJ stone. She was seen by urology clinic today and was sent to the ED for evaluation to see if stone has moved per clinic note. Patient reports left flank pain at this time. She states dilaudid is the only medication she can get for pain because she is on suboxone and she would like her mediport to be accessed.     The history is provided by the patient. No  was used.     Review of patient's allergies indicates:   Allergen Reactions    Metoclopramide hcl      Reglan    Tramadol Anaphylaxis     Past Medical History:   Diagnosis Date    Depression     Hypertension     Multiple sclerosis      Past Surgical History:   Procedure Laterality Date     SECTION  2006    DILATION AND CURETTAGE OF UTERUS      LITHOTRIPSY      PORTACATH PLACEMENT Right 02/10/2020     Family History   Adopted: Yes     Social History     Tobacco Use    Smoking status: Every Day     Types: Vaping with nicotine    Smokeless tobacco: Current   Substance Use Topics    Alcohol use: Yes     Comment: occassionally    Drug use: Not Currently     Review of Systems   Constitutional:  Negative for chills and fever.   Respiratory:  Negative for cough and shortness of breath.    Cardiovascular:  Negative for chest pain.   Gastrointestinal:  Negative for abdominal pain, constipation, diarrhea, nausea and vomiting.   Genitourinary:  Positive for flank pain and hematuria. Negative for difficulty urinating and dysuria.   Musculoskeletal:  Negative for arthralgias and myalgias.   Skin:  Negative for rash.    Neurological:  Negative for syncope, light-headedness and headaches.   All other systems reviewed and are negative.      Physical Exam     Initial Vitals [12/23/24 1411]   BP Pulse Resp Temp SpO2   135/87 66 20 97.8 °F (36.6 °C) 96 %      MAP       --         Physical Exam    Vitals reviewed.  Constitutional: She is not diaphoretic. No distress.   HENT:   Head: Normocephalic and atraumatic. Mouth/Throat: Oropharynx is clear and moist. No oropharyngeal exudate.   Eyes: Conjunctivae and EOM are normal.   Neck: Neck supple.   Cardiovascular:  Normal rate, regular rhythm, normal heart sounds and intact distal pulses.           Pulmonary/Chest: Breath sounds normal. No respiratory distress.   Abdominal: Abdomen is soft. Bowel sounds are normal. She exhibits no distension. There is no abdominal tenderness.   No right CVA tenderness.  No left CVA tenderness. There is no rebound and no guarding.   Musculoskeletal:         General: No edema.      Cervical back: Neck supple.     Neurological: She is alert and oriented to person, place, and time. GCS score is 15. GCS eye subscore is 4. GCS verbal subscore is 5. GCS motor subscore is 6.   Skin: Skin is warm and dry. Capillary refill takes less than 2 seconds. No rash noted.   Psychiatric: She has a normal mood and affect.         ED Course   Procedures  Labs Reviewed   URINALYSIS, REFLEX TO URINE CULTURE - Abnormal       Result Value    Color, UA Yellow      Appearance, UA Turbid (*)     Specific Gravity, UA 1.020      pH, UA 6.0      Protein, UA 2+ (*)     Glucose, UA Normal      Ketones, UA 1+ (*)     Blood, UA 3+ (*)     Bilirubin, UA 1+ (*)     Urobilinogen, UA 1+ (*)     Nitrites, UA Negative      Leukocyte Esterase, UA 75 (*)     RBC, UA >100 (*)     WBC, UA 21-50 (*)     Bacteria, UA None Seen      Squamous Epithelial Cells, UA Moderate (*)     Mucous, UA Trace (*)     Hyaline Casts, UA None Seen     COMPREHENSIVE METABOLIC PANEL - Abnormal    Sodium 139      Potassium  3.1 (*)     Chloride 108 (*)     CO2 26      Glucose 184 (*)     Blood Urea Nitrogen 6.5 (*)     Creatinine 0.67      Calcium 9.9      Protein Total 6.4      Albumin 3.2 (*)     Globulin 3.2      Albumin/Globulin Ratio 1.0 (*)     Bilirubin Total 0.9            ALT 52      AST 55 (*)     eGFR >60      Anion Gap 5.0      BUN/Creatinine Ratio 10     CBC WITH DIFFERENTIAL - Abnormal    WBC 7.99      RBC 3.85 (*)     Hgb 12.7      Hct 38.8      .8 (*)     MCH 33.0 (*)     MCHC 32.7 (*)     RDW 13.0      Platelet 286      MPV 11.1 (*)     Neut % 79.6      Lymph % 10.5      Mono % 8.0      Eos % 0.9      Basophil % 0.5      Lymph # 0.84      Neut # 6.36      Mono # 0.64      Eos # 0.07      Baso # 0.04      IG# 0.04      IG% 0.5      NRBC% 0.0     LIPASE - Normal    Lipase Level 4     MAGNESIUM - Normal    Magnesium Level 2.00     CULTURE, URINE   CBC W/ AUTO DIFFERENTIAL    Narrative:     The following orders were created for panel order CBC Auto Differential.  Procedure                               Abnormality         Status                     ---------                               -----------         ------                     CBC with Differential[[id number]]       Abnormal            Final result                 Please view results for these tests on the individual orders.   URINALYSIS, REFLEX TO URINE CULTURE          Imaging Results              CT Renal Stone Study ABD Pelvis WO (Final result)  Result time 12/23/24 16:04:48      Final result by Tao Cleaning MD (12/23/24 16:04:48)                   Impression:      1. Left pelvic change in calculus causing moderate hydronephrosis.    2. Left kidney multiple non occluding calculi.    3. Details of other findings above.      Electronically signed by: Tao Cleaning  Date:    12/23/2024  Time:    16:04               Narrative:    EXAMINATION:  CT RENAL STONE STUDY ABD PELVIS WO    CLINICAL HISTORY:  Flank pain, kidney stone  suspected;    TECHNIQUE:  Multidetector axial images were obtained from the  diaphragms to below symphysis pubis without the administration of IV contrast. Oral contrast was not administered.    Dose length product of 335 mGycm. Automated exposure control was utilized to minimize radiation dose.    COMPARISON:  March 13, 2024    FINDINGS:  Included lungs are without suspicious nodularity, acute air space infiltrates or fluid within the pleural spaces.    Liver is enlarged in size maximum diameter of 18.5 cm and is remarkable for pronounced steatosis.  No focal hepatic space-occupying lesion.  Within limitations of noncontrast technique, no acute findings of the liver, pancreas and spleen identified. Gallbladder wall is not thickened and there is no intraluminal calcified calculus. No apparent biliary dilation.    The adrenal glands noncontrast evaluation is unremarkable.  Right kidney is markedly atrophic in size with few calcifications.  Left kidney cortical volume is unremarkable.  Left kidney is remarkable for multiple non occluding calculi within the lower pole.  There is left ureteropelvic junction 1.2 cm calculus which results in moderate left hydronephrosis.  No significant perinephric strandings.    Stomach is mostly decompressed.  No abnormal dilatation of loops of small bowel.  Appendix is unremarkable on image 122 series 5.  Colon is nondistended there are no pericolonic acute standings.  No free fluid.    Urinary bladder is mostly decompressed without thickened wall.  No intravesical stone identified. There is no pelvic free fluid.                                       Medications   cefTRIAXone injection 1 g (has no administration in time range)   ketorolac injection 30 mg (30 mg Intramuscular Given 12/23/24 1523)   potassium bicarbonate disintegrating tablet 40 mEq (40 mEq Oral Given 12/23/24 1640)   HYDROmorphone tablet 2 mg (2 mg Oral Given 12/23/24 1712)     Medical Decision Making  Ddx includes but  is not limited to ureteral stone, acute pyelonephritis, acute cystitis, amongst others     Amount and/or Complexity of Data Reviewed  External Data Reviewed: notes.  Labs: ordered. Decision-making details documented in ED Course.  Radiology: ordered. Decision-making details documented in ED Course.  Discussion of management or test interpretation with external provider(s): Case discussed with Dr. Covarrubias who agrees with current management and plan     Risk  Prescription drug management.               ED Course as of 12/23/24 1738   Mon Dec 23, 2024   1611 WBC: 7.99 [SA]   1611 Hemoglobin: 12.7 [SA]   1611 Hematocrit: 38.8 [SA]   1611 Platelet Count: 286 [SA]   1611 BUN(!): 6.5 [SA]   1611 Creatinine: 0.67 [SA]   1611 Potassium(!): 3.1 [SA]   1611 Lipase: 4 [SA]   1611 NITRITE UA: Negative [SA]   1611 Leukocyte Esterase, UA(!): 75 [SA]   1611 RBC, UA(!): >100 [SA]   1611 WBC, UA(!): 21-50 [SA]   1611 Bacteria, UA: None Seen [SA]   1656 Cased discussed with urology at [facility] per their request and they discussed patient with [facility] who recommended transferring patient today for urology for stent placement today due to uncontrolled pain. Transfer process initiated. Patient requesting to go to [facility] if possible - she lives in [place].  [SA]   1713 Patient accepted by Dr. Beltran at [facility].  [SA]   1738 Patient is stable for transfer [SA]      ED Course User Index  [SA] Halie Orozco PA                     Clinical Impression:  Final diagnoses:  [N20.1] Left ureteral stone (Primary)  [N13.30] Hydronephrosis, unspecified hydronephrosis type  [E87.6] Hypokalemia  [R52] Uncontrolled pain        ED Disposition Condition    Transfer to Another Facility Stable                Halie Orozco PA  12/23/24 1738

## 2024-12-23 NOTE — PROGRESS NOTES
Chief Complaint:   Chief Complaint   Patient presents with    1 month follow up       HPI:   Patient a year female here for one-month follow-up neurogenic bladder due to multiple sclerosis.  Patient history multiple sclerosis patient has been wheelchair-bound for the past 2 years.  Patient has a history of neurogenic bladder, requiring diapers for the past 1 year. Also has recurrent UTI.  Patient having increased episodes of stress urinary incontinence associated with laughing, coughing, straining for the past year she has been gone to 2-3 briefs per day.  Today patient complains of persistent left kidney stone pain she was seen in emergency room in [place] on 12/16/2024 diagnosed with left hydronephrosis and left UPJ kidney stone approximately 1.5 cm diameter.  Patient's pain at a 8-9/10 currently.  Patient transferred via wheelchair ASAP to emergency room for kidney stone workup.  On patient's last office visit instructed patient to start Myrbetriq 50 mg p.o. daily RTC one-month for re-evaluation.           Allergies:  Review of patient's allergies indicates:   Allergen Reactions    Metoclopramide hcl      Reglan    Tramadol Anaphylaxis       Medications:  Current Outpatient Medications   Medication Sig Dispense Refill    blood sugar diagnostic (TRUE METRIX GLUCOSE TEST STRIP) Strp 1 strip by Misc.(Non-Drug; Combo Route) route once daily. 90 strip 3    blood-glucose meter (TRUE METRIX GLUCOSE METER) kit Use as instructed 1 each 0    buprenorphine-naloxone 8-2 mg (SUBOXONE) 8-2 mg Place 1 each under the tongue 3 (three) times daily.      buPROPion (WELLBUTRIN XL) 300 MG 24 hr tablet Take 300 mg by mouth once daily.      cloNIDine (CATAPRES) 0.1 MG tablet Take 0.1 mg by mouth 3 (three) times daily as needed (withdrawal, anxiety, insomnia).      glimepiride (AMARYL) 1 MG tablet Take 1 tablet (1 mg total) by mouth before breakfast. 90 tablet 3    lamoTRIgine (LAMICTAL) 100 MG tablet Take 100 mg by mouth. 1 tablet  in the am and 1/2 tablet in the pm      lancets 32 gauge Misc 1 lancet  by Misc.(Non-Drug; Combo Route) route once daily. 100 each 3    levothyroxine (SYNTHROID) 50 MCG tablet Take 1 tablet (50 mcg total) by mouth before breakfast. 90 tablet 3    losartan (COZAAR) 50 MG tablet Take 1 tablet (50 mg total) by mouth once daily. 90 tablet 3    lumateperone (CAPLYTA) 42 mg Cap Take 42 mg by mouth.      mirabegron (MYRBETRIQ) 50 mg Tb24 Take 1 tablet (50 mg total) by mouth once daily. 30 tablet 11    tiZANidine (ZANAFLEX) 4 MG tablet Take 4 mg by mouth every 8 (eight) hours.      verapamiL (CALAN-SR) 120 MG CR tablet TAKE 1 TABLET BY MOUTH EVERY NIGHT 30 tablet 3    EScitalopram oxalate (LEXAPRO) 10 MG tablet Take 10 mg by mouth once daily.      nitrofurantoin, macrocrystal-monohydrate, (MACROBID) 100 MG capsule Take 100 mg by mouth every 12 (twelve) hours.       No current facility-administered medications for this visit.       Review of Systems:  General: No fever, chills, fatigability, or weight loss.  Skin: No rashes, itching, or changes in color or texture of skin.  Chest: Denies SERRATO, cyanosis, wheezing, cough, and sputum production.  Abdomen: Appetite fine. No weight loss. Denies diarrhea, abdominal pain, hematemesis, or blood in stool.  Musculoskeletal: No joint stiffness or swelling. Denies back pain.  : As above.  All other review of systems negative.    PMH:  Past Medical History:   Diagnosis Date    Depression     Hypertension     Multiple sclerosis        PSH:  Past Surgical History:   Procedure Laterality Date     SECTION  2006    DILATION AND CURETTAGE OF UTERUS      LITHOTRIPSY      PORTACATH PLACEMENT Right 02/10/2020       FamHx:  Family History   Adopted: Yes       SocHx:  Social History     Socioeconomic History    Marital status:    Tobacco Use    Smoking status: Every Day     Types: Vaping with nicotine    Smokeless tobacco: Current   Substance and Sexual Activity    Alcohol  use: Yes     Comment: occassionally    Drug use: Not Currently    Sexual activity: Not Currently   Social History Narrative    ** Merged History Encounter **            Physical Exam:  Vitals:    12/23/24 1338   BP: (!) 138/93   Pulse: 66   Resp: 18   Temp: 98.1 °F (36.7 °C)     General: A&Ox3, no apparent distress, no deformities  Neck: No masses, normal thyroid  Lungs: CTA tamar, no use of accessory muscles  Heart: RRR, no arrhythmias  Abdomen: Soft, NT, ND, no masses, no hernias, no hepatosplenomegaly  Lymphatic: Neck and groin nodes negative  Skin: The skin is warm and dry. No jaundice.  Ext: No c/c/e.       Imaging:  CT of the abdomen pelvis without contrast on 12/16/2024:Large 1.5 cm calculus at the left ureteropelvic junction causing mild left-sided hydronephrosis. There are multiple other left renal calculi seen.   Atrophic left kidney. Severe fatty infiltration of the liver with hepatomegaly. Increased attenuation within the gallbladder suspicious for sludge or possible small stones.       Impression:  Kidney stone    Plan:  Instructed patient to be transported directly to emergency room [facility] to have a kidney stone workup to evaluate location of left kidney stone previously in the left UPJ along with hydronephrosis in the left.  Instructed patient we will discuss with the emergency room where kidney stone is and will converse with [place] to determine plan of care for kidney stone pain.    RTC 6 months with renal ultrasound.  Instructed patient if develops any abnormal urologic symptoms notify clinic to be re-evaluate treated or during after hours go to emergency room versus urgent here.                           F

## 2024-12-23 NOTE — PROGRESS NOTES
Patient seen in clinic by Gabe Shaw.  Patient escorted to the ED for further evaluation and treatment due to renal calculi. Instructed patient and spouse to contact provider in AM for further follow up after ED visit.

## 2024-12-24 ENCOUNTER — TELEPHONE (OUTPATIENT)
Dept: UROLOGY | Facility: CLINIC | Age: 44
End: 2024-12-24
Payer: MEDICAID

## 2024-12-24 DIAGNOSIS — N20.0 KIDNEY STONE: Primary | ICD-10-CM

## 2024-12-24 NOTE — TELEPHONE ENCOUNTER
Spoke to patient's  Lane he stated a stent was placed in [place] by urologist this morning on 12/24/2024.  I instructed patient to return in 2 weeks with a KUB to evaluate presence of stone.

## 2024-12-26 LAB — BACTERIA UR CULT: NORMAL

## 2025-01-07 ENCOUNTER — TELEPHONE (OUTPATIENT)
Dept: UROLOGY | Facility: CLINIC | Age: 45
End: 2025-01-07
Payer: MEDICAID

## 2025-01-07 NOTE — TELEPHONE ENCOUNTER
Attempted to contact patient to confirm appointment for Urology on 01/08/25. Unable to reach patient or leave voicemail to confirm appointment. Will attempt later to remind patient to have KUB done prior to visit.

## 2025-01-08 ENCOUNTER — OFFICE VISIT (OUTPATIENT)
Dept: UROLOGY | Facility: CLINIC | Age: 45
End: 2025-01-08
Payer: MEDICAID

## 2025-01-08 ENCOUNTER — HOSPITAL ENCOUNTER (OUTPATIENT)
Dept: RADIOLOGY | Facility: HOSPITAL | Age: 45
Discharge: HOME | End: 2025-01-08
Attending: NURSE PRACTITIONER
Payer: MEDICAID

## 2025-01-08 VITALS
DIASTOLIC BLOOD PRESSURE: 63 MMHG | RESPIRATION RATE: 20 BRPM | HEART RATE: 84 BPM | SYSTOLIC BLOOD PRESSURE: 90 MMHG | WEIGHT: 123 LBS | OXYGEN SATURATION: 100 % | HEIGHT: 62 IN | BODY MASS INDEX: 22.63 KG/M2 | TEMPERATURE: 98 F

## 2025-01-08 DIAGNOSIS — N20.0 KIDNEY STONE: ICD-10-CM

## 2025-01-08 DIAGNOSIS — N31.9 NEUROGENIC BLADDER: ICD-10-CM

## 2025-01-08 DIAGNOSIS — N20.0 RENAL CALCULUS, LEFT: Primary | ICD-10-CM

## 2025-01-08 PROCEDURE — 3078F DIAST BP <80 MM HG: CPT | Mod: CPTII,,, | Performed by: NURSE PRACTITIONER

## 2025-01-08 PROCEDURE — 1159F MED LIST DOCD IN RCRD: CPT | Mod: CPTII,,, | Performed by: NURSE PRACTITIONER

## 2025-01-08 PROCEDURE — 3008F BODY MASS INDEX DOCD: CPT | Mod: CPTII,,, | Performed by: NURSE PRACTITIONER

## 2025-01-08 PROCEDURE — 74018 RADEX ABDOMEN 1 VIEW: CPT | Mod: TC

## 2025-01-08 PROCEDURE — 4010F ACE/ARB THERAPY RXD/TAKEN: CPT | Mod: CPTII,,, | Performed by: NURSE PRACTITIONER

## 2025-01-08 PROCEDURE — 99213 OFFICE O/P EST LOW 20 MIN: CPT | Mod: S$PBB,,, | Performed by: NURSE PRACTITIONER

## 2025-01-08 PROCEDURE — 99215 OFFICE O/P EST HI 40 MIN: CPT | Mod: PBBFAC,25 | Performed by: NURSE PRACTITIONER

## 2025-01-08 PROCEDURE — 3074F SYST BP LT 130 MM HG: CPT | Mod: CPTII,,, | Performed by: NURSE PRACTITIONER

## 2025-01-08 NOTE — PROGRESS NOTES
Chief Complaint:   Chief Complaint   Patient presents with    kidney stone- neurogenic       HPI:   Patient a year female here for one-month follow-up neurogenic bladder due to multiple sclerosis.  Patient history multiple sclerosis patient has been wheelchair-bound for the past 2 years.  Patient has a history of neurogenic bladder, requiring diapers for the past 1 year. Also has recurrent UTI.  Patient having increased episodes of stress urinary incontinence associated with laughing, coughing, straining for the past year she has been gone to 2-3 briefs per day.  Today patient complains of persistent left kidney stone pain she was seen in emergency room in [place] on 12/16/2024 diagnosed with left hydronephrosis and left UPJ kidney stone approximately 1.5 cm diameter.  Patient's pain at a 8-9/10 currently.  Patient seen in [facility] for a JJ stent placement with a cystoscope on 12/24/2024.  Patient seen in emergency room in [place] on 01/07/2025 CT performed not currently on chart, spoke to nurse to retrieve CT results from [place] and will speak to Dr. Clements regarding further treatment with the possibility of lithotripsy.  Patient currently having intermittent waxing and waning pain she is on Suboxone program and can only get injections to emergency room.  On patient's previous visit started Myrbetriq 50 mg p.o. daily RTC one-month for re-evaluation.  Patient states current urinary frequency much improved from last visit due to Myrbetriq.      Allergies:  Review of patient's allergies indicates:   Allergen Reactions    Metoclopramide hcl      Reglan    Tramadol Anaphylaxis       Medications:  Current Outpatient Medications   Medication Sig Dispense Refill    blood sugar diagnostic (TRUE METRIX GLUCOSE TEST STRIP) Strp 1 strip by Misc.(Non-Drug; Combo Route) route once daily. 90 strip 3    blood-glucose meter (TRUE METRIX GLUCOSE METER) kit Use as instructed 1 each 0     buprenorphine-naloxone 8-2 mg (SUBOXONE) 8-2 mg Place 1 each under the tongue 3 (three) times daily.      buPROPion (WELLBUTRIN XL) 300 MG 24 hr tablet Take 300 mg by mouth once daily.      cloNIDine (CATAPRES) 0.1 MG tablet Take 0.1 mg by mouth 3 (three) times daily as needed (withdrawal, anxiety, insomnia).      EScitalopram oxalate (LEXAPRO) 10 MG tablet Take 10 mg by mouth once daily.      glimepiride (AMARYL) 1 MG tablet Take 1 tablet (1 mg total) by mouth before breakfast. 90 tablet 3    lamoTRIgine (LAMICTAL) 100 MG tablet Take 100 mg by mouth. 1 tablet in the am and 1/2 tablet in the pm      lancets 32 gauge Misc 1 lancet  by Misc.(Non-Drug; Combo Route) route once daily. 100 each 3    levothyroxine (SYNTHROID) 50 MCG tablet Take 1 tablet (50 mcg total) by mouth before breakfast. 90 tablet 3    losartan (COZAAR) 50 MG tablet Take 1 tablet (50 mg total) by mouth once daily. 90 tablet 3    lumateperone (CAPLYTA) 42 mg Cap Take 42 mg by mouth.      tiZANidine (ZANAFLEX) 4 MG tablet Take 4 mg by mouth every 8 (eight) hours.      verapamiL (CALAN-SR) 120 MG CR tablet TAKE 1 TABLET BY MOUTH EVERY NIGHT 30 tablet 3    mirabegron (MYRBETRIQ) 50 mg Tb24 Take 1 tablet (50 mg total) by mouth once daily. (Patient not taking: Reported on 1/8/2025) 30 tablet 11    nitrofurantoin, macrocrystal-monohydrate, (MACROBID) 100 MG capsule Take 100 mg by mouth every 12 (twelve) hours. (Patient not taking: Reported on 1/8/2025)       No current facility-administered medications for this visit.       Review of Systems:  General: No fever, chills, fatigability, or weight loss.  Skin: No rashes, itching, or changes in color or texture of skin.  Chest: Denies SERRATO, cyanosis, wheezing, cough, and sputum production.  Abdomen: Appetite fine. No weight loss. Denies diarrhea, abdominal pain, hematemesis, or blood in stool.  Musculoskeletal: No joint stiffness or swelling. Denies back pain.  : As above.  All other review of systems  negative.    PMH:  Past Medical History:   Diagnosis Date    Depression     Hypertension     Multiple sclerosis        PSH:  Past Surgical History:   Procedure Laterality Date     SECTION  2006    DILATION AND CURETTAGE OF UTERUS      LITHOTRIPSY      PORTACATH PLACEMENT Right 02/10/2020       FamHx:  Family History   Adopted: Yes       SocHx:  Social History     Socioeconomic History    Marital status:    Tobacco Use    Smoking status: Every Day     Types: Vaping with nicotine     Passive exposure: Current    Smokeless tobacco: Current   Substance and Sexual Activity    Alcohol use: Yes     Comment: occassionally    Drug use: Not Currently    Sexual activity: Not Currently   Social History Narrative    ** Merged History Encounter **          Social Drivers of Health     Financial Resource Strain: Low Risk  (2024)    [email or web address]    Overall Financial Resource Strain (CARDIA)     Difficulty of Paying Living Expenses: Not very hard   Food Insecurity: No Food Insecurity (2024)    Received from [employer] [facility]    Hunger Vital Sign     Worried About Running Out of Food in the Last Year: Never true     Ran Out of Food in the Last Year: Never true   Transportation Needs: No Transportation Needs (2024)    Received from [employer] [facility]    PRAPARE - Transportation     Lack of Transportation (Medical): No     Lack of Transportation (Non-Medical): No   Housing Stability: Low Risk  (2024)    [facility] [facility]    Housing Stability Vital Sign     Unable to Pay for Housing in the Last Year: No     Number of Times Moved in the Last Year: 0     Homeless in the Last Year: No        Physical Exam:  Vitals:    01/08/25 0921   BP: 90/63   Pulse: 84   Resp: 20   Temp: 97.7 °F (36.5 °C)     General: A&Ox3, no apparent distress, no deformities  Neck: No masses, normal thyroid  Lungs: CTA tamar, no use of accessory muscles  Heart: RRR, no arrhythmias  Abdomen: Soft, NT, ND, no masses, no hernias, no hepatosplenomegaly  Lymphatic: Neck and groin nodes negative  Skin: The skin is warm and dry. No jaundice.  Ext: No c/c/e.      Imaging:  CT of the abdomen pelvis without contrast on 12/23/2024:  Right kidney is markedly atrophic in size with few calcifications. Left kidney cortical volume is unremarkable. Left kidney is remarkable for multiple non occluding calculi within the lower pole. There is left ureteropelvic junction 1.2 cm calculus which results in moderate left hydronephrosis. No significant perinephric strandings.  Pending current CT performed yesterday we will retrieve from [facility].      Impression:  1. Renal calculus, left  - POCT URINE DIPSTICK WITH MICROSCOPE, AUTOMATED  - X-Ray Abdomen AP 1 View; Future  - X-Ray Abdomen AP 1 View; Future  - Case Request Operating Room: LITHOTRIPSY, ESWL    2. Neurogenic bladder  - POCT URINE DIPSTICK WITH MICROSCOPE, AUTOMATED  - X-Ray Abdomen AP 1 View; Future      Plan:  Instructed patient to get KUB in [place] and then will notify patient of results when completed and determine whether to do lithotripsy or weight for patient to past.  Increase fluid intake, limit salt in diet, limit protein to 30%, intake adequate calcium, decrease brand, soy, Beets, Almonds, Rhubard, Buckwheat flour, baked potato, raspberry, potato chips Spinach, Miso, Tahini, sesame, Swiss Chard. Instructed patient on Avoiding bladder irritants, such as alcohol, citrus drinks or foods,salty foods, energy drinks, spicy foods, caffeine, sodas.  RTC after KUB will notify patient of plan.  RTC 2 weeks if needed unless patient has lithotripsy.  Instructed patient if  develops any abnormal urologic symptoms notify clinic to be re-evaluate treated or during after hours go to emergency room versus urgent here.                           F

## 2025-01-14 ENCOUNTER — TELEPHONE (OUTPATIENT)
Dept: UROLOGY | Facility: CLINIC | Age: 45
End: 2025-01-14
Payer: MEDICAID

## 2025-01-14 DIAGNOSIS — R82.90 ABNORMAL URINE: Primary | ICD-10-CM

## 2025-01-14 NOTE — TELEPHONE ENCOUNTER
Voice message left for patient to perform a urine culture and sensitivity in regards to upcoming lithotripsy test.  I instructed patient to perform test either today Tuesday or latest by tomorrow Wednesday for next Tuesdays lithotripsy.

## 2025-01-23 ENCOUNTER — ANESTHESIA EVENT (OUTPATIENT)
Dept: SURGERY | Facility: HOSPITAL | Age: 45
End: 2025-01-23
Payer: MEDICAID

## 2025-01-23 NOTE — ANESTHESIA PREPROCEDURE EVALUATION
Janis Reese is a 44 y.o. female PRESENTING FOR LITHOTRIPSY, ESWL (Left)  with a history of   -Renal calculus, left   -HTN  -MS     -WC BOUND  -T2DM ( 12/23/24); AM CBG  -HYPOTHYROIDISM; TSH= 0.889 4/24/24  -BIPOLAR D/O/DEPRESSION  -H/O ETOH ABUSE & PANCREATITIS   -STRESS UI/NEUROGENIC BLADDER  -VAPES  -MILD ELEVATION IN LFTS  -ON SUBOXONE       Per Dr. Nydia Reese- Md instructions are as follows:  -Pt to continue on current dose of Suboxone  -Pain control for immediately after procedure should be IV pain control  -then may switch to OTC NSAID/Tylenol-NO opioids  -Pt will be instructed by Dr. Reese to call her if having uncontrolled pain in the days following the procedure for MD to adjust suboxone dose     BETA-BLOCKER: NONE    New Orders for Anesthesia: DM PROTOCOL, UPT    Patient Active Problem List   Diagnosis    Multiple sclerosis    Essential hypertension    Bipolar 1 disorder    Hypokalemia    History of pancreatitis    Leukocytosis    Hypercalcemia    Chronic pain    Alcohol abuse    Hyponatremia    Transaminitis    Hypomagnesemia    Hypothyroidism    Type 2 diabetes mellitus, without long-term current use of insulin    Recurrent UTI    Neurogenic bladder    Spastic diplegia, acquired, lower extremity    High risk medication use    LISA (stress urinary incontinence, female)    Kidney stone       Pre-op Assessment    I have reviewed the NPO Status.      Review of Systems  Anesthesia Hx:  No problems with previous Anesthesia                Social:  Alcohol Use, Vaping       Cardiovascular:     Hypertension, poorly controlled                                          Pulmonary:  Pulmonary Normal                       Renal/:   renal calculi               Hepatic/GI:  Hepatic/GI Normal                    Neurological:           Multiple Sclerosis--last flare over a month ago                            Endocrine:  Diabetes, well controlled, type 2 Hypothyroidism          Psych:  Psychiatric  Noted, and I reviewed yest's E-advice to Dr. Becerra RE these symptoms.   History  depression              Vitals:    02/04/25 0850 02/04/25 0853 02/04/25 1058   BP:  (!) 161/97 (!) 147/99   Pulse:  77 78   Resp:  18 20   Temp:  36.9 °C (98.5 °F) 36.3 °C (97.3 °F)   TempSrc:  Oral Temporal   SpO2:  98% 100%   Weight: 52.7 kg (116 lb 3.2 oz)           Physical Exam  General: Alert, Cooperative and Well nourished    Airway:  Mallampati: II   Mouth Opening: Normal  TM Distance: Normal  Tongue: Normal  Neck ROM: Normal ROM    Dental:  Intact    Chest/Lungs:  Clear to auscultation, Normal Respiratory Rate    Heart:  Rate: Normal  Rhythm: Regular Rhythm  Sounds: Normal       Latest Reference Range & Units 02/04/25 09:21   hCG Qualitative, Urine Negative  Negative      Latest Reference Range & Units 02/04/25 10:04   POCT Glucose 70 - 110 mg/dL 150 (H)   (H): Data is abnormally high  Lab Results   Component Value Date    WBC 7.99 12/23/2024    HGB 12.7 12/23/2024    HCT 38.8 12/23/2024    .8 (H) 12/23/2024     12/23/2024       CMP  Sodium   Date Value Ref Range Status   12/23/2024 139 136 - 145 mmol/L Final   08/04/2024 148 (H) 136 - 145 mmol/L Final   05/05/2022 138 136 - 145 mmol/L Final     Potassium   Date Value Ref Range Status   12/23/2024 3.1 (L) 3.5 - 5.1 mmol/L Final   08/04/2024 3.7 3.5 - 5.1 mmol/L Final   05/05/2022 3.8 3.5 - 5.1 mmol/L Final     Chloride   Date Value Ref Range Status   12/23/2024 108 (H) 98 - 107 mmol/L Final   08/04/2024 107 95 - 110 mmol/L Final   05/05/2022 101 98 - 107 mmol/L Final     CO2   Date Value Ref Range Status   12/23/2024 26 22 - 29 mmol/L Final   08/04/2024 23 23 - 29 mmol/L Final   05/05/2022 25 22 - 29 mmol/L Final     Glucose   Date Value Ref Range Status   08/04/2024 128 (H) 70 - 110 mg/dL Final     BUN   Date Value Ref Range Status   08/04/2024 7 6 - 20 mg/dL Final   05/05/2022 8.3 6 - 20 mg/dL Final     Blood Urea Nitrogen   Date Value Ref Range Status   12/23/2024 6.5 (L) 7.0 - 18.7 mg/dL Final     Creatinine   Date Value Ref Range  Status   12/23/2024 0.67 0.55 - 1.02 mg/dL Final   08/04/2024 0.7 0.5 - 1.4 mg/dL Final   05/05/2022 0.70 0.50 - 0.90 mg/dL Final     Calcium   Date Value Ref Range Status   12/23/2024 9.9 8.4 - 10.2 mg/dL Final   08/04/2024 9.1 8.7 - 10.5 mg/dL Final   05/05/2022 9.9 8.6 - 10.2 mg/dL Final     Total Protein   Date Value Ref Range Status   07/20/2024 6.3 6.0 - 8.4 g/dL Final     Albumin   Date Value Ref Range Status   12/23/2024 3.2 (L) 3.5 - 5.0 g/dL Final   07/20/2024 3.0 (L) 3.5 - 5.2 g/dL Final   05/05/2022 4.5 3.5 - 5.2 g/dL Final     Total Bilirubin   Date Value Ref Range Status   07/20/2024 0.4 0.1 - 1.0 mg/dL Final     Comment:     For infants and newborns, interpretation of results should be based  on gestational age, weight and in agreement with clinical  observations.    Premature Infant recommended reference ranges:  Up to 24 hours.............<8.0 mg/dL  Up to 48 hours............<12.0 mg/dL  3-5 days..................<15.0 mg/dL  6-29 days.................<15.0 mg/dL    For patients on Eltrombopag therapy, use of Dimension [place] TBIL is   not   recommended.       Bilirubin Total   Date Value Ref Range Status   12/23/2024 0.9 <=1.5 mg/dL Final     Alkaline Phosphatase   Date Value Ref Range Status   07/20/2024 127 55 - 135 U/L Final     ALP   Date Value Ref Range Status   12/23/2024 114 40 - 150 unit/L Final     AST   Date Value Ref Range Status   12/23/2024 55 (H) 5 - 34 unit/L Final   07/20/2024 35 10 - 40 U/L Final   05/05/2022 23 0 - 32 U/L Final     ALT   Date Value Ref Range Status   12/23/2024 52 0 - 55 unit/L Final   07/20/2024 65 (H) 10 - 44 U/L Final     Anion Gap   Date Value Ref Range Status   08/04/2024 18 (H) 3 - 11 mmol/L Final   05/05/2022 12.0 8.0 - 17.0 mmol/L Final     Comment:     NOTE  Testing performed at:  [facility], [address] [place] [address]  [id number] CLIA #:[id number]       eGFR   Date Value Ref Range Status   12/23/2024 >60 mL/min/1.73/m2 Final   08/04/2024  >60.0 >60 mL/min/1.73 m^2 Final     Lab Results   Component Value Date    HGBA1C 7.1 (H) 08/21/2023       Past anesthesia records: cysto/stent        Anesthesia Plan  Type of Anesthesia, risks & benefits discussed:    Anesthesia Type: Gen Supraglottic Airway  Intra-op Monitoring Plan: Standard ASA Monitors  Post Op Pain Control Plan: IV/PO Opioids PRN  Induction:  IV  Airway Plan: Direct  Informed Consent: Informed consent signed with the Patient and all parties understand the risks and agree with anesthesia plan.  All questions answered.   ASA Score: 3  Day of Surgery Review of History & Physical: H&P Update referred to the surgeon/provider.    Ready For Surgery From Anesthesia Perspective.     .

## 2025-01-27 NOTE — PROGRESS NOTES
I phoned [facility] (Dr. Nydia Reese [phone]) on 1/27/25 at 1:00 pm to request a clearance to hold Suboxene and/or to provide recommendations. I also left a message requesting the wellness office to call patient with recommendations to make sure she understands what to do. Awaiting return call from Addiction wellness facility.

## 2025-01-29 NOTE — OR NURSING
"PACE: Concerning Suboxone recommendations:    Received call from [facility], they do not take care of patient Janis Reese but the caller was able to provide me with the following information:     If the patient sees Dr Nydia Reese she most likely goes to [facility] in either [place] or [place]. The phone number for the [place] office is [phone].    Call made to [facility] in [place]. Spoke to Marciano, she was not able to provide me with any information concerning Ms Janis Reese at this time as they are having "computer issues." I left our contact information over here at OPS/PACE so that someone could call us back.     "

## 2025-01-30 NOTE — OR NURSING
"PACE  1050-Follow up phone call made to [facility] [phone] in attempt to reach Dr Nydia Reese in regards to recommendations for suboxone prior to upcoming procedure on 2/4/2025. Spoke with "Valery" who stated that Dr Reese will not be back at the clinic until 2/3/2025 and that pt Janis Reese has an appt day before procedure-2/3/2025 at [facility]. Valery states that she will email Dr Reese in regards to the requested information and call back if she gets any information.    Called pt-left message-asked for a return call to verify suboxone medication instructions that pt might have received from provider -waiting for return call    8596-Spoke with Dr Nydia Reese- Md instructions are as follows:  -Pt to continue on current dose of Suboxone  -Pain control for immediately after procedure should be IV pain control  -then may switch to OTC NSAID/Tylenol-NO opioids  -Pt will be instructed by Dr Reese to call her if having uncontrolled pain in the days following the procedure for MD to adjust suboxone dose     Attempted to call pt with given information-no answer-waiting for return call        "

## 2025-02-03 DIAGNOSIS — G35 SECONDARY PROGRESSIVE MULTIPLE SCLEROSIS: ICD-10-CM

## 2025-02-03 DIAGNOSIS — I10 ESSENTIAL HYPERTENSION: ICD-10-CM

## 2025-02-03 RX ORDER — OFATUMUMAB 20 MG/.4ML
20 INJECTION, SOLUTION SUBCUTANEOUS
Qty: 0.4 ML | Refills: 5 | Status: SHIPPED | OUTPATIENT
Start: 2025-02-03 | End: 2025-06-03

## 2025-02-03 RX ORDER — GLIMEPIRIDE 2 MG/1
2 TABLET ORAL
Qty: 90 TABLET | Refills: 2 | Status: SHIPPED | OUTPATIENT
Start: 2025-02-03

## 2025-02-03 RX ORDER — LOSARTAN POTASSIUM 50 MG/1
50 TABLET ORAL
Qty: 90 TABLET | Refills: 3 | Status: SHIPPED | OUTPATIENT
Start: 2025-02-03

## 2025-02-04 ENCOUNTER — HOSPITAL ENCOUNTER (OUTPATIENT)
Facility: HOSPITAL | Age: 45
Discharge: HOME | End: 2025-02-04
Attending: UROLOGY | Admitting: UROLOGY
Payer: MEDICAID

## 2025-02-04 ENCOUNTER — ANESTHESIA (OUTPATIENT)
Dept: SURGERY | Facility: HOSPITAL | Age: 45
End: 2025-02-04
Payer: MEDICAID

## 2025-02-04 DIAGNOSIS — N20.0 RENAL CALCULUS: ICD-10-CM

## 2025-02-04 LAB
B-HCG UR QL: NEGATIVE
CTP QC/QA: YES
POCT GLUCOSE: 150 MG/DL (ref 70–110)
POCT GLUCOSE: 55 MG/DL (ref 70–110)
POCT GLUCOSE: 72 MG/DL (ref 70–110)

## 2025-02-04 PROCEDURE — D9220A PRA ANESTHESIA: Mod: CRNA,,, | Performed by: NURSE ANESTHETIST, CERTIFIED REGISTERED

## 2025-02-04 PROCEDURE — 71000016 HC POSTOP RECOV ADDL HR: Performed by: UROLOGY

## 2025-02-04 PROCEDURE — 36000705 HC OR TIME LEV I EA ADD 15 MIN: Performed by: UROLOGY

## 2025-02-04 PROCEDURE — D9220A PRA ANESTHESIA: Mod: ANES,,, | Performed by: ANESTHESIOLOGY

## 2025-02-04 PROCEDURE — 82962 GLUCOSE BLOOD TEST: CPT | Performed by: UROLOGY

## 2025-02-04 PROCEDURE — 36000704 HC OR TIME LEV I 1ST 15 MIN: Performed by: UROLOGY

## 2025-02-04 PROCEDURE — 71000033 HC RECOVERY, INTIAL HOUR: Performed by: UROLOGY

## 2025-02-04 PROCEDURE — 50590 FRAGMENTING OF KIDNEY STONE: CPT | Mod: LT,,, | Performed by: UROLOGY

## 2025-02-04 PROCEDURE — 37000009 HC ANESTHESIA EA ADD 15 MINS: Performed by: UROLOGY

## 2025-02-04 PROCEDURE — 63600175 PHARM REV CODE 636 W HCPCS: Performed by: NURSE ANESTHETIST, CERTIFIED REGISTERED

## 2025-02-04 PROCEDURE — 25000003 PHARM REV CODE 250: Performed by: NURSE ANESTHETIST, CERTIFIED REGISTERED

## 2025-02-04 PROCEDURE — 63600175 PHARM REV CODE 636 W HCPCS: Performed by: ANESTHESIOLOGY

## 2025-02-04 PROCEDURE — 37000008 HC ANESTHESIA 1ST 15 MINUTES: Performed by: UROLOGY

## 2025-02-04 PROCEDURE — 71000015 HC POSTOP RECOV 1ST HR: Performed by: UROLOGY

## 2025-02-04 PROCEDURE — 25000003 PHARM REV CODE 250: Performed by: NURSE PRACTITIONER

## 2025-02-04 PROCEDURE — 81025 URINE PREGNANCY TEST: CPT | Performed by: NURSE PRACTITIONER

## 2025-02-04 RX ORDER — ONDANSETRON HYDROCHLORIDE 2 MG/ML
4 INJECTION, SOLUTION INTRAVENOUS DAILY PRN
Status: DISCONTINUED | OUTPATIENT
Start: 2025-02-04 | End: 2025-02-04 | Stop reason: HOSPADM

## 2025-02-04 RX ORDER — CEFAZOLIN SODIUM 1 G/3ML
INJECTION, POWDER, FOR SOLUTION INTRAMUSCULAR; INTRAVENOUS
Status: DISCONTINUED | OUTPATIENT
Start: 2025-02-04 | End: 2025-02-04

## 2025-02-04 RX ORDER — GLUCAGON 1 MG
1 KIT INJECTION
Status: DISCONTINUED | OUTPATIENT
Start: 2025-02-04 | End: 2025-02-04 | Stop reason: HOSPADM

## 2025-02-04 RX ORDER — MIDAZOLAM HYDROCHLORIDE 2 MG/2ML
2 INJECTION, SOLUTION INTRAMUSCULAR; INTRAVENOUS ONCE AS NEEDED
Status: COMPLETED | OUTPATIENT
Start: 2025-02-04 | End: 2025-02-04

## 2025-02-04 RX ORDER — CEFAZOLIN SODIUM 1 G/3ML
2 INJECTION, POWDER, FOR SOLUTION INTRAMUSCULAR; INTRAVENOUS ONCE
Status: DISCONTINUED | OUTPATIENT
Start: 2025-02-04 | End: 2025-02-04 | Stop reason: HOSPADM

## 2025-02-04 RX ORDER — DEXTROSE 50 % IN WATER (D50W) INTRAVENOUS SYRINGE
12.5
Status: DISCONTINUED | OUTPATIENT
Start: 2025-02-04 | End: 2025-02-04

## 2025-02-04 RX ORDER — KETOROLAC TROMETHAMINE 30 MG/ML
INJECTION, SOLUTION INTRAMUSCULAR; INTRAVENOUS
Status: DISCONTINUED | OUTPATIENT
Start: 2025-02-04 | End: 2025-02-04

## 2025-02-04 RX ORDER — DEXAMETHASONE SODIUM PHOSPHATE 4 MG/ML
INJECTION, SOLUTION INTRA-ARTICULAR; INTRALESIONAL; INTRAMUSCULAR; INTRAVENOUS; SOFT TISSUE
Status: DISCONTINUED | OUTPATIENT
Start: 2025-02-04 | End: 2025-02-04

## 2025-02-04 RX ORDER — HYDROMORPHONE HYDROCHLORIDE 1 MG/ML
0.2 INJECTION, SOLUTION INTRAMUSCULAR; INTRAVENOUS; SUBCUTANEOUS EVERY 5 MIN PRN
Status: DISCONTINUED | OUTPATIENT
Start: 2025-02-04 | End: 2025-02-04 | Stop reason: HOSPADM

## 2025-02-04 RX ORDER — PROPOFOL 10 MG/ML
INJECTION, EMULSION INTRAVENOUS
Status: DISCONTINUED | OUTPATIENT
Start: 2025-02-04 | End: 2025-02-04

## 2025-02-04 RX ORDER — MEPERIDINE HYDROCHLORIDE 25 MG/ML
12.5 INJECTION INTRAMUSCULAR; INTRAVENOUS; SUBCUTANEOUS EVERY 10 MIN PRN
Status: DISCONTINUED | OUTPATIENT
Start: 2025-02-04 | End: 2025-02-04 | Stop reason: HOSPADM

## 2025-02-04 RX ORDER — SODIUM CHLORIDE, SODIUM LACTATE, POTASSIUM CHLORIDE, CALCIUM CHLORIDE 600; 310; 30; 20 MG/100ML; MG/100ML; MG/100ML; MG/100ML
INJECTION, SOLUTION INTRAVENOUS CONTINUOUS
Status: DISCONTINUED | OUTPATIENT
Start: 2025-02-04 | End: 2025-02-04 | Stop reason: HOSPADM

## 2025-02-04 RX ORDER — OXYCODONE HYDROCHLORIDE 5 MG/1
5 TABLET ORAL
Status: DISCONTINUED | OUTPATIENT
Start: 2025-02-04 | End: 2025-02-04 | Stop reason: HOSPADM

## 2025-02-04 RX ORDER — ONDANSETRON HYDROCHLORIDE 2 MG/ML
INJECTION, SOLUTION INTRAVENOUS
Status: DISCONTINUED | OUTPATIENT
Start: 2025-02-04 | End: 2025-02-04

## 2025-02-04 RX ORDER — LIDOCAINE HYDROCHLORIDE 20 MG/ML
INJECTION INTRAVENOUS
Status: DISCONTINUED | OUTPATIENT
Start: 2025-02-04 | End: 2025-02-04

## 2025-02-04 RX ORDER — FENTANYL CITRATE 50 UG/ML
INJECTION, SOLUTION INTRAMUSCULAR; INTRAVENOUS
Status: DISCONTINUED | OUTPATIENT
Start: 2025-02-04 | End: 2025-02-04

## 2025-02-04 RX ORDER — INSULIN ASPART 100 [IU]/ML
0-5 INJECTION, SOLUTION INTRAVENOUS; SUBCUTANEOUS ONCE AS NEEDED
Status: DISCONTINUED | OUTPATIENT
Start: 2025-02-04 | End: 2025-02-04 | Stop reason: HOSPADM

## 2025-02-04 RX ORDER — SODIUM CHLORIDE 0.9 % (FLUSH) 0.9 %
10 SYRINGE (ML) INJECTION
Status: DISCONTINUED | OUTPATIENT
Start: 2025-02-04 | End: 2025-02-04 | Stop reason: HOSPADM

## 2025-02-04 RX ORDER — GLUCAGON 1 MG
1 KIT INJECTION
Status: DISCONTINUED | OUTPATIENT
Start: 2025-02-04 | End: 2025-02-04

## 2025-02-04 RX ADMIN — HYDROMORPHONE HYDROCHLORIDE 0.2 MG: 1 INJECTION, SOLUTION INTRAMUSCULAR; INTRAVENOUS; SUBCUTANEOUS at 01:02

## 2025-02-04 RX ADMIN — ONDANSETRON 4 MG: 2 INJECTION INTRAMUSCULAR; INTRAVENOUS at 12:02

## 2025-02-04 RX ADMIN — HYDROMORPHONE HYDROCHLORIDE 0.2 MG: 1 INJECTION, SOLUTION INTRAMUSCULAR; INTRAVENOUS; SUBCUTANEOUS at 02:02

## 2025-02-04 RX ADMIN — CEFAZOLIN 2 G: 330 INJECTION, POWDER, FOR SOLUTION INTRAMUSCULAR; INTRAVENOUS at 12:02

## 2025-02-04 RX ADMIN — DEXTROSE MONOHYDRATE 12.5 G: 25 INJECTION, SOLUTION INTRAVENOUS at 09:02

## 2025-02-04 RX ADMIN — DEXAMETHASONE SODIUM PHOSPHATE 4 MG: 4 INJECTION, SOLUTION INTRA-ARTICULAR; INTRALESIONAL; INTRAMUSCULAR; INTRAVENOUS; SOFT TISSUE at 12:02

## 2025-02-04 RX ADMIN — PROPOFOL 150 MG: 10 INJECTION, EMULSION INTRAVENOUS at 12:02

## 2025-02-04 RX ADMIN — MIDAZOLAM HYDROCHLORIDE 2 MG: 1 INJECTION, SOLUTION INTRAMUSCULAR; INTRAVENOUS at 11:02

## 2025-02-04 RX ADMIN — LIDOCAINE HYDROCHLORIDE 50 MG: 20 INJECTION INTRAVENOUS at 12:02

## 2025-02-04 RX ADMIN — SODIUM CHLORIDE: 9 INJECTION, SOLUTION INTRAVENOUS at 11:02

## 2025-02-04 RX ADMIN — FENTANYL CITRATE 50 MCG: 50 INJECTION INTRAMUSCULAR; INTRAVENOUS at 12:02

## 2025-02-04 RX ADMIN — KETOROLAC TROMETHAMINE 30 MG: 30 INJECTION, SOLUTION INTRAMUSCULAR at 12:02

## 2025-02-04 NOTE — TRANSFER OF CARE
Anesthesia Transfer of Care Note    Patient: Janis Stone Reese    Procedure(s) Performed: Procedure(s) (LRB):  LITHOTRIPSY, ESWL (Left)    Patient location: PACU    Anesthesia Type: general    Transport from OR: Transported from OR on room air with adequate spontaneous ventilation    Post pain: adequate analgesia    Post assessment: no apparent anesthetic complications and tolerated procedure well    Post vital signs: stable    Level of consciousness: sedated    Nausea/Vomiting: no nausea/vomiting    Complications: none    Transfer of care protocol was followed

## 2025-02-04 NOTE — H&P
CC:  Left renal calculus    HPI:  aJnis Reese is a 44 y.o. female here for ESWL.  A patient was initially seen in [place] on 2024 and a CT scan showed a left ureteropelvic junction calculus measuring approximately 1.5 cm.  She had a stent placement done on 2024.  She is now here for ESWL of the calculus.    ROS:  All systems reviewed and are negative except as documented in HPI and/or Assessment and Plan.     Patient Active Problem List:     Patient Active Problem List   Diagnosis    Multiple sclerosis    Essential hypertension    Bipolar 1 disorder    Hypokalemia    History of pancreatitis    Leukocytosis    Hypercalcemia    Chronic pain    Alcohol abuse    Hyponatremia    Transaminitis    Hypomagnesemia    Hypothyroidism    Type 2 diabetes mellitus, without long-term current use of insulin    Recurrent UTI    Neurogenic bladder    Spastic diplegia, acquired, lower extremity    High risk medication use    LISA (stress urinary incontinence, female)    Kidney stone        Past Medical History:  Past Medical History:   Diagnosis Date    Depression     Hypertension     Multiple sclerosis         Past Surgical History:  Past Surgical History:   Procedure Laterality Date     SECTION  2006    DILATION AND CURETTAGE OF UTERUS      LITHOTRIPSY      PORTACATH PLACEMENT Right 02/10/2020        Family History:  Family History   Adopted: Yes        Social History:  Social History     Socioeconomic History    Marital status:    Tobacco Use    Smoking status: Every Day     Types: Vaping with nicotine     Passive exposure: Current    Smokeless tobacco: Current   Substance and Sexual Activity    Alcohol use: Yes     Comment: occassionally    Drug use: Not Currently    Sexual activity: Not Currently   Social History Narrative    ** Merged History Encounter **          Social Drivers of Health     Financial Resource Strain: Low Risk  (2024)    [facility] [employer]  [facility]    Overall Financial Resource Strain (CARDIA)     Difficulty of Paying Living Expenses: Not very hard   Food Insecurity: No Food Insecurity (12/24/2024)    [facility]    Hunger Vital Sign     Worried About Running Out of Food in the Last Year: Never true     Ran Out of Food in the Last Year: Never true   Transportation Needs: No Transportation Needs (12/24/2024)    [facility]    PRAPARE - Transportation     Lack of Transportation (Medical): No     Lack of Transportation (Non-Medical): No   Housing Stability: Low Risk  (12/24/2024)    Received from [facility] Its [facility] and [facility]    Housing Stability Vital Sign     Unable to Pay for Housing in the Last Year: No     Number of Times Moved in the Last Year: 0     Homeless in the Last Year: No        Medications:  Current Outpatient Medications   Medication Instructions    blood-glucose meter (TRUE METRIX GLUCOSE METER) kit Use as instructed    buprenorphine-naloxone 8-2 mg (SUBOXONE) 8-2 mg 1 each, 3 times daily    buPROPion (WELLBUTRIN XL) 300 mg, Daily    CAPLYTA 42 mg    cloNIDine (CATAPRES) 0.1 mg, 3 times daily PRN    EScitalopram oxalate (LEXAPRO) 10 mg, Daily    glimepiride (AMARYL) 2 mg, Oral, Before breakfast    KESIMPTA PEN 20 mg, Subcutaneous, Every 28 days    lamoTRIgine (LAMICTAL) 100 mg    levothyroxine (SYNTHROID) 50 mcg, Oral, Before breakfast    losartan (COZAAR) 50 mg, Oral    mirabegron (MYRBETRIQ) 50 mg, Oral, Daily    nitrofurantoin, macrocrystal-monohydrate, (MACROBID) 100 MG capsule 100 mg, Every 12 hours (non-standard times)    tiZANidine (ZANAFLEX) 4 mg, Every 8 hours    verapamiL (CALAN-SR) 120 mg, Oral, Nightly        Allergies:  Review of  patient's allergies indicates:   Allergen Reactions    Metoclopramide hcl      Reglan    Tramadol Anaphylaxis        Objective:  There were no vitals filed for this visit.  General:  Well developed, well nourished adult female in no acute distress  Abdomen: Soft, nontender, no masses  Extremities:  No clubbing, cyanosis, or edema  Neurologic:  Grossly intact  Musculoskeletal:  Normal tone    Imaging:  See HPI.    Assessment:  UPJ calculus, left    Plan:  ESWL, left

## 2025-02-04 NOTE — DISCHARGE SUMMARY
[facility]  Discharge Note  Short Stay    Procedure(s) (LRB):  LITHOTRIPSY, ESWL (Left)      OUTCOME: Patient tolerated treatment/procedure well without complication and is now ready for discharge.    DISPOSITION: Home or Self Care    FINAL DIAGNOSIS:  Renal calculus    FOLLOWUP: In clinic   Future Appointments   Date Time Provider Department Center   3/6/2025  2:30 PM MARCOS YEE NEUROLOGY [facility] NEURO [place]    6/24/2025 11:00 AM Gabe Shaw NP [facility] UROLO A[place]    7/3/2025  2:15 PM Keila Lewis MD [facility] NEURO Todd Un       DISCHARGE INSTRUCTIONS:    Discharge Procedure Orders   Nursing communication   Order Comments: Please give patient a strainer.     Activity as tolerated        TIME SPENT ON DISCHARGE: 15 minutes

## 2025-02-04 NOTE — ANESTHESIA POSTPROCEDURE EVALUATION
Anesthesia Post Evaluation    Patient: Janis Stone Reese    Procedure(s) Performed: Procedure(s) (LRB):  LITHOTRIPSY, ESWL (Left)    Final Anesthesia Type: general      Patient location during evaluation: DOSC  Post-procedure vital signs: reviewed and stable  Airway patency: patent      Anesthetic complications: no      Cardiovascular status: hemodynamically stable  Respiratory status: spontaneous ventilation  Follow-up not needed.              Vitals Value Taken Time   /103 02/04/25 1346   Temp 36.3 °C (97.3 °F) 02/04/25 1250   Pulse 80 02/04/25 1356   Resp 20 02/04/25 1351   SpO2 94 % 02/04/25 1356   Vitals shown include unfiled device data.      Event Time   Out of Recovery 13:13:00         Pain/Beto Score: Pain Rating Prior to Med Admin: 9 (2/4/2025  1:51 PM)  Beto Score: 10 (2/4/2025  1:10 PM)

## 2025-02-04 NOTE — DISCHARGE INSTRUCTIONS
· Keep follow up appointment [facility].  Resume home medications unless otherwise instructed by your doctor.    · Activity as tolerated.    · Make sure you drink plenty of water daily - 8 to 10 glasses. To help flush out the remaining broken stones.    ` If asked to filter your urine, bring stones to follow up appointment.    `You may take a shower starting tomorrow evening.     · Do not soak in water for two weeks. Do not take baths, swim, or use a hot tub until your doctor says it is okay.    · Use pain medication as instructed. Alternate Tylenol and Ibuprofen every 3-4 hours as needed.    · You may use an ice pack as needed for 20 minutes at a time over your incision site to minimize swelling and help relieve pain.    · Do not drink alcohol or drive today, or as long as you are on pain medication.    ` Small amount of bleeding can occur when urinating.     · Notify MD of any moderate to severe pain unrelieved by pain medicine, if you have increased bleeding, or for any signs of infection including fever above 100.4, excessive redness or swelling, yellow/green foul- smelling drainage, uncontrolled nausea or vomiting. Clinics number is [phone]. If it is after business hours you may need to be evaluated at an emergency department    · Thanks for choosing [facility]! Have a smooth recovery!

## 2025-02-04 NOTE — OP NOTE
UROLOGY OPERATIVE NOTE      Date of Procedure:   2/4/2025    Pre-op Diagnosis: Renal calculus, left    Post-op Diagnosis: Renal calculus, left    Procedure:  ESWL, left    Surgeon: Idalmis Holcomb D.O.    Assistant: None    Anesthesia: General    Specimens:  None    Complications: None    Blood Loss: 0 cc    Findings: Calculus was well visualized in the left renal pelvis    Disposition: Taken to the PACU.    Condition: Stable    Procedure in Detail:  The patient was taken to the OR and placed in the supine position.  Time out was performed.  The patient received appropriate preoperative antibiotic therapy.      The stone was visualized in the left renal pelvis on AP and oblique planes using live fluorsoscopy.   The shock head was coupled to the left flank.  Lithotripsy was begun.  A total of 2500 shocks were given at a max power of 7.  The patient tolerated the procedure well.  The patient was taken to the postanesthesia care unit in stable condition.

## 2025-02-07 VITALS
WEIGHT: 116.19 LBS | HEART RATE: 83 BPM | OXYGEN SATURATION: 95 % | TEMPERATURE: 99 F | SYSTOLIC BLOOD PRESSURE: 157 MMHG | BODY MASS INDEX: 21.11 KG/M2 | DIASTOLIC BLOOD PRESSURE: 102 MMHG | RESPIRATION RATE: 22 BRPM

## 2025-02-14 DIAGNOSIS — N20.0 RENAL CALCULUS, LEFT: Primary | ICD-10-CM

## 2025-02-21 ENCOUNTER — OFFICE VISIT (OUTPATIENT)
Dept: UROLOGY | Facility: CLINIC | Age: 45
End: 2025-02-21
Payer: MEDICAID

## 2025-02-21 ENCOUNTER — HOSPITAL ENCOUNTER (OUTPATIENT)
Dept: RADIOLOGY | Facility: HOSPITAL | Age: 45
Discharge: HOME | End: 2025-02-21
Attending: NURSE PRACTITIONER
Payer: MEDICAID

## 2025-02-21 VITALS
RESPIRATION RATE: 20 BRPM | HEART RATE: 68 BPM | OXYGEN SATURATION: 97 % | HEIGHT: 62 IN | BODY MASS INDEX: 20.43 KG/M2 | TEMPERATURE: 97 F | WEIGHT: 111 LBS | DIASTOLIC BLOOD PRESSURE: 71 MMHG | SYSTOLIC BLOOD PRESSURE: 95 MMHG

## 2025-02-21 DIAGNOSIS — N20.0 RENAL CALCULUS, LEFT: ICD-10-CM

## 2025-02-21 DIAGNOSIS — N20.0 RENAL CALCULUS, LEFT: Primary | ICD-10-CM

## 2025-02-21 LAB
BILIRUB SERPL-MCNC: NEGATIVE MG/DL
BLOOD URINE, POC: NORMAL
COLOR, POC UA: YELLOW
GLUCOSE UR QL STRIP: NEGATIVE
KETONES UR QL STRIP: NEGATIVE
LEUKOCYTE ESTERASE URINE, POC: NORMAL
NITRITE, POC UA: NEGATIVE
PH, POC UA: 6.5
PROTEIN, POC: 100
SPECIFIC GRAVITY, POC UA: 1.02
UROBILINOGEN, POC UA: 0.2

## 2025-02-21 PROCEDURE — 99024 POSTOP FOLLOW-UP VISIT: CPT | Mod: ,,, | Performed by: UROLOGY

## 2025-02-21 PROCEDURE — 3074F SYST BP LT 130 MM HG: CPT | Mod: CPTII,,, | Performed by: UROLOGY

## 2025-02-21 PROCEDURE — 3078F DIAST BP <80 MM HG: CPT | Mod: CPTII,,, | Performed by: UROLOGY

## 2025-02-21 PROCEDURE — 74018 RADEX ABDOMEN 1 VIEW: CPT | Mod: TC

## 2025-02-21 PROCEDURE — 1160F RVW MEDS BY RX/DR IN RCRD: CPT | Mod: CPTII,,, | Performed by: UROLOGY

## 2025-02-21 PROCEDURE — 81001 URINALYSIS AUTO W/SCOPE: CPT | Mod: PBBFAC | Performed by: UROLOGY

## 2025-02-21 PROCEDURE — 4010F ACE/ARB THERAPY RXD/TAKEN: CPT | Mod: CPTII,,, | Performed by: UROLOGY

## 2025-02-21 PROCEDURE — 99215 OFFICE O/P EST HI 40 MIN: CPT | Mod: PBBFAC,25 | Performed by: UROLOGY

## 2025-02-21 PROCEDURE — 1159F MED LIST DOCD IN RCRD: CPT | Mod: CPTII,,, | Performed by: UROLOGY

## 2025-02-21 NOTE — PROGRESS NOTES
"CC:  Post op ESWL    HPI:  Janis Reese is a 44 y.o. female here after ESWL.   She had ESWL on 2025.  She had no complications following the procedure.  She has been passing stone fragments.    Urinalysis:    Results for orders placed or performed in visit on 25   POCT URINE DIPSTICK WITH MICROSCOPE, AUTOMATED   Result Value Ref Range    Color, UA Yellow     Spec Grav UA 1.020     pH, UA 6.5     WBC, UA Moderate     Nitrite, UA Negative     Protein,      Glucose, UA Negative     Ketones, UA Negative     Urobilinogen, UA 0.2     Bilirubin, POC Negative     Blood, UA Large      Microscopic Urinalysis:  WBC:   3-4 per HPF     RBC:    6-10 per HPF     Bacteria:    None per HPF     Squamous epithelial cells:  2-3 per HPF      Crystals:   None    Lab Results:  No results for input(s): "CREATININE" in the last 760 hours.     Imaging:  KUB today:  The calculus seen renal pelvis on the previous imaging prior to ESWL is no longer visible however there are more fragments noted in the lower pole of the left kidney.  On the previous KUB there was a partial staghorn in the lower pole.  Stent is in place.    ROS:  All systems reviewed and are negative except as documented in HPI and/or Assessment and Plan.     Patient Active Problem List:     Problem List[1]     Past Medical History:  Past Medical History:   Diagnosis Date    Depression     Hypertension     Multiple sclerosis         Past Surgical History:  Past Surgical History:   Procedure Laterality Date     SECTION  2006    DILATION AND CURETTAGE OF UTERUS      EXTRACORPOREAL SHOCK WAVE LITHOTRIPSY Left 2025    Procedure: LITHOTRIPSY, ESWL;  Surgeon: Idalmis Holcomb DO;  Location: [facility];  Service: Urology;  Laterality: Left;    LITHOTRIPSY      PORTACATH PLACEMENT Right 02/10/2020        Family History:  Family History   Adopted: Yes        Social History:  Social History     Socioeconomic History    Marital status:  "   Tobacco Use    Smoking status: Every Day     Types: Vaping with nicotine     Passive exposure: Current    Smokeless tobacco: Current   Substance and Sexual Activity    Alcohol use: Yes     Comment: occassionally    Drug use: Not Currently    Sexual activity: Not Currently   Social History Narrative    ** Merged History Encounter **          Social Drivers of Health     Financial Resource Strain: Low Risk  (12/24/2024)    Received [facility] [employer] [facility]    Overall Financial Resource Strain (CARDIA)     Difficulty of Paying Living Expenses: Not very hard   Food Insecurity: No Food Insecurity (12/24/2024)    [facility]    Hunger Vital Sign     Worried About Running Out of Food in the Last Year: Never true     Ran Out of Food in the Last Year: Never true   Transportation Needs: No Transportation Needs (12/24/2024)    [facility]    PRAPARE - Transportation     Lack of Transportation (Medical): No     Lack of Transportation (Non-Medical): No   Housing Stability: Low Risk  (12/24/2024)    [facility]    Housing Stability Vital Sign     Unable to Pay for Housing in the Last Year: No     Number of Times Moved in the Last Year: 0     Homeless in the Last Year: No        Allergies:  Review of patient's allergies indicates:   Allergen Reactions    Metoclopramide hcl      Reglan    Tramadol Anaphylaxis        Objective:  Vitals:    02/21/25 1305   BP: 95/71   Pulse: 68   Resp: 20   Temp: 97.3 °F (36.3 °C)     General:  Well developed, well nourished adult female in no acute distress  Abdomen: Soft, nontender, no masses  Extremities:  No clubbing, cyanosis, or edema  Neurologic:  Grossly  intact  Musculoskeletal:  Normal tone    Assessment:  1. Renal calculus, left  - POCT URINE DIPSTICK WITH MICROSCOPE, AUTOMATED  - CT Renal Stone Study ABD Pelvis WO; Future     Plan:     I am going to get a CT scan to evaluate the remaining stones.  If there is a significant amount of stone fragments in the lower pole she will need referral for ureteroscopy.      Follow-up studies needed:  After CT    Follow-up:    After above study.             [1]   Patient Active Problem List  Diagnosis    Multiple sclerosis    Essential hypertension    Bipolar 1 disorder    Hypokalemia    History of pancreatitis    Leukocytosis    Hypercalcemia    Chronic pain    Alcohol abuse    Hyponatremia    Transaminitis    Hypomagnesemia    Hypothyroidism    Type 2 diabetes mellitus, without long-term current use of insulin    Recurrent UTI    Neurogenic bladder    Spastic diplegia, acquired, lower extremity    High risk medication use    LISA (stress urinary incontinence, female)    Renal calculus

## 2025-02-24 ENCOUNTER — TELEPHONE (OUTPATIENT)
Dept: NEUROLOGY | Facility: CLINIC | Age: 45
End: 2025-02-24
Payer: MEDICAID

## 2025-02-24 NOTE — TELEPHONE ENCOUNTER
----- Message from Keila Lewis MD sent at 2/24/2025  7:18 AM CST -----  Would recommend that he ask her PCP to do that instead. Dr. Lewis  ----- Message -----  From: Lebron Bonilla LPN  Sent: 2/21/2025   3:20 PM CST  To: Keila Lewis MD      ----- Message -----  From: Kiera Lee  Sent: 2/21/2025   2:56 PM CST  To: [facility] Neurology Clinical Support Staff    Janis Reese  came in requesting if Dr. Lewis can give him a document stating that he's his wife caregiver.Call back #- [phone] Brice Reese

## 2025-02-28 NOTE — TELEPHONE ENCOUNTER
I am not patient PCP as Patient was requested to seek care elsewhere secondary to multiple missed appointments.

## 2025-03-06 ENCOUNTER — PROCEDURE VISIT (OUTPATIENT)
Dept: NEUROLOGY | Facility: CLINIC | Age: 45
End: 2025-03-06
Payer: MEDICAID

## 2025-03-06 VITALS
WEIGHT: 118 LBS | OXYGEN SATURATION: 100 % | HEIGHT: 62 IN | BODY MASS INDEX: 21.71 KG/M2 | DIASTOLIC BLOOD PRESSURE: 101 MMHG | HEART RATE: 103 BPM | SYSTOLIC BLOOD PRESSURE: 155 MMHG | TEMPERATURE: 98 F | RESPIRATION RATE: 18 BRPM

## 2025-03-06 DIAGNOSIS — G82.20 SPASTIC DIPLEGIA, ACQUIRED, LOWER EXTREMITY: ICD-10-CM

## 2025-03-06 DIAGNOSIS — G35 MULTIPLE SCLEROSIS: Primary | ICD-10-CM

## 2025-03-06 PROCEDURE — 64643 CHEMODENERV 1 EXTREM 1-4 EA: CPT | Mod: PBBFAC | Performed by: PSYCHIATRY & NEUROLOGY

## 2025-03-06 PROCEDURE — 95874 GUIDE NERV DESTR NEEDLE EMG: CPT | Mod: 50,PBBFAC | Performed by: PSYCHIATRY & NEUROLOGY

## 2025-03-06 PROCEDURE — 64644 CHEMODENERV 1 EXTREM 5/> MUS: CPT | Mod: PBBFAC | Performed by: PSYCHIATRY & NEUROLOGY

## 2025-03-06 PROCEDURE — 64645 CHEMODENERV 1 EXTREM 5/> EA: CPT | Mod: PBBFAC | Performed by: PSYCHIATRY & NEUROLOGY

## 2025-03-06 PROCEDURE — 64642 CHEMODENERV 1 EXTREMITY 1-4: CPT | Mod: PBBFAC | Performed by: PSYCHIATRY & NEUROLOGY

## 2025-03-06 NOTE — PROCEDURES
[facility] Neurology Outpatient Botox Procedure Note    History of Present Illness     This is 44 y.o. 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 Patient is here for Botox for spasticity for spastic diplegia. States now that she is completely dependent on  for IADL and ADLs due to inability to transfer.  is requesting a letter from medicaid stating that he is the primary care giver of the patient.     Review of System      reviewed. stable.    Focused Exam     Motor: increased left tone in LUE and bilateral LE, and bulk wnl throughout, no abnormal involuntary or voluntary movements, 5/5 to confrontation except for 4-/5 in LUE, 3/5 in L IP/EHL/TA, 4/5 Right IP, left pronator drift.     Assessment     This is 44 y.o. 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    EDSS 6.5     Procedure     Date of procedure: 3/6/2025    Procedure: Bilateral Lower Extremity Chemodenervation with Botulinum toxin, 12 muscles, EMG guided    The patient was identified and informed consent was reviewed with the patient, and we discussed the risks, benefits and alternatives.  Specifically, we discussed the risks of bleeding, infection and nerve injury with worsened pain and function. Specifically, we discussed the most frequently reported adverse reactions following injection of BOTOX for chronic migraine include muscular weakness (4%), musculoskeletal stiffness (4%), bronchitis (3%), injection-site pain (3%), musculoskeletal pain (3%), myalgia (3%), hypertension (2%), and muscle spasms (2%). The patient verbalized an understanding of these risks and the symptoms and the potentially catastrophic consequences of this occurrence. The  patient verbalized an understanding that if she should begin to have these symptoms that she should immediately go to the nearest emergency room for evaluation.  The patient was then positioned. The injection sites were identified and was prepped with Alcohol. 8 cc of preservative free normal saline was mixed with 800 units of Botox. A 25-gauge, 1.5 inch needle was then used. Muscles injected as below. Injection site confirmed via EMG. Patient tolerated the procedure well with no complaints.    Left  Gastrocnemius Lateral Head: 2 sites, 50 units   Gastrocnemius Medial Head: 2 sites, 50 units  Soleus: 2 sites, 60 units  Tibialis Posterior: 1 sites, 40 units  Flexor Hallucis Longus: 1 site, 30 units   Flexor Digitorum Longus: 1 site, 30 units       Right  Gastrocnemius Lateral Head: 2 sites, 50 units   Gastrocnemius Medial Head: 2 sites, 50 units  Soleus: 2 sites, 60 units  Tibialis Posterior: 2 sites, 50 units  Flexor Hallucis Longus: 1 site, 30 units   Flexor Digitorum Longus: 1 site, 30 units      Total Units: 530 units  Total Muscles Injected: 12        Follow Up       RTC office visit      Keila Lewis MD   [facility] General Neurology

## 2025-03-14 ENCOUNTER — PATIENT MESSAGE (OUTPATIENT)
Dept: NEUROLOGY | Facility: CLINIC | Age: 45
End: 2025-03-14
Payer: MEDICAID

## 2025-05-15 ENCOUNTER — HOSPITAL ENCOUNTER (OUTPATIENT)
Dept: RADIOLOGY | Facility: HOSPITAL | Age: 45
Discharge: HOME | End: 2025-05-15
Attending: UROLOGY
Payer: MEDICAID

## 2025-05-15 DIAGNOSIS — N20.0 RENAL CALCULUS, LEFT: ICD-10-CM

## 2025-05-15 PROCEDURE — 74176 CT ABD & PELVIS W/O CONTRAST: CPT | Mod: TC

## 2025-05-16 ENCOUNTER — OFFICE VISIT (OUTPATIENT)
Dept: UROLOGY | Facility: CLINIC | Age: 45
End: 2025-05-16
Payer: MEDICAID

## 2025-05-16 VITALS
BODY MASS INDEX: 21.7 KG/M2 | HEART RATE: 84 BPM | OXYGEN SATURATION: 96 % | HEIGHT: 62 IN | RESPIRATION RATE: 18 BRPM | WEIGHT: 117.94 LBS | TEMPERATURE: 98 F | DIASTOLIC BLOOD PRESSURE: 96 MMHG | SYSTOLIC BLOOD PRESSURE: 148 MMHG

## 2025-05-16 DIAGNOSIS — N39.498 OTHER URINARY INCONTINENCE: ICD-10-CM

## 2025-05-16 DIAGNOSIS — N31.9 NEUROGENIC BLADDER: ICD-10-CM

## 2025-05-16 DIAGNOSIS — R82.90 ABNORMAL URINE: ICD-10-CM

## 2025-05-16 DIAGNOSIS — N20.0 RENAL CALCULUS, LEFT: Primary | ICD-10-CM

## 2025-05-16 DIAGNOSIS — N26.1 ATROPHIC KIDNEY: ICD-10-CM

## 2025-05-16 LAB
BILIRUB SERPL-MCNC: NEGATIVE MG/DL
BLOOD URINE, POC: NORMAL
COLOR, POC UA: YELLOW
GLUCOSE UR QL STRIP: NEGATIVE
KETONES UR QL STRIP: NEGATIVE
LEUKOCYTE ESTERASE URINE, POC: NORMAL
NITRITE, POC UA: NEGATIVE
PH, POC UA: 6
PROTEIN, POC: 30
SPECIFIC GRAVITY, POC UA: 1.02
UROBILINOGEN, POC UA: 0.2

## 2025-05-16 PROCEDURE — 99215 OFFICE O/P EST HI 40 MIN: CPT | Mod: PBBFAC | Performed by: UROLOGY

## 2025-05-16 PROCEDURE — 87086 URINE CULTURE/COLONY COUNT: CPT | Performed by: UROLOGY

## 2025-05-16 NOTE — PROGRESS NOTES
CC:    Renal calculus    HPI:  Janis Reese is a 44 y.o. female here for follow-up of renal calculus.  She had ESWL on 2025.  She did pass some fragments after the procedure.  The first follow-up KUB showed some fragments remaining in the lower portion of the dilated renal pelvis.  She also had at least two other large stones in the lower pole of the left kidney.  She has an atrophic right kidney.  She has a neurogenic bladder and has had problems with incontinence.  She was placed on Gemtesa and that has been working very well for her.  She refilled today.      Urinalysis:    Results for orders placed or performed in visit on 25   POCT URINE DIPSTICK WITH MICROSCOPE, AUTOMATED   Result Value Ref Range    Color, UA Yellow     Spec Grav UA 1.020     pH, UA 6.5     WBC, UA Moderate     Nitrite, UA Negative     Protein,      Glucose, UA Negative     Ketones, UA Negative     Urobilinogen, UA 0.2     Bilirubin, POC Negative     Blood, UA Large      Microscopic Urinalysis:  WBC:   10-15 per HPF     RBC:    7-10 per HPF     Bacteria:    Few per HPF     Squamous epithelial cells:  None per HPF      Crystals:   None    Imaging:  CT - 15 May 2025:  Severe atrophic right kidney with multiple cysts, unchanged  Moderate to severe left hydronephrosis  There are dependently layering stones within the calices at the mid to lower left kidney and within the left renal pelvis.  The large stone seen in the left renal pelvis is no longer present.  Left ureteral stent is in place.    Data Review:    ROS:  All systems reviewed and are negative except as documented in HPI and/or Assessment and Plan.     Patient Active Problem List:     Problem List[1]     Past Medical History:  Past Medical History:   Diagnosis Date    Depression     Hypertension     Multiple sclerosis         Past Surgical History:  Past Surgical History:   Procedure Laterality Date     SECTION  2006    DILATION AND CURETTAGE  OF UTERUS      EXTRACORPOREAL SHOCK WAVE LITHOTRIPSY Left 2/4/2025    Procedure: LITHOTRIPSY, ESWL;  Surgeon: Idalmis Holcomb DO;  Location: [facility];  Service: Urology;  Laterality: Left;    LITHOTRIPSY      PORTACATH PLACEMENT Right 02/10/2020        Family History:  Family History   Adopted: Yes        Social History:  Social History     Socioeconomic History    Marital status:    Tobacco Use    Smoking status: Every Day     Types: Vaping with nicotine     Passive exposure: Current    Smokeless tobacco: Current   Substance and Sexual Activity    Alcohol use: Not Currently     Comment: occassionally    Drug use: Not Currently    Sexual activity: Not Currently   Social History Narrative    ** Merged History Encounter **          Social Drivers of Health     Financial Resource Strain: Low Risk  (12/24/2024)    [facility]    Overall Financial Resource Strain (CARDIA)     Difficulty of Paying Living Expenses: Not very hard   Food Insecurity: No Food Insecurity (12/24/2024)    Received from [facility]    Hunger Vital Sign     Worried About Running Out of Food in the Last Year: Never true     Ran Out of Food in the Last Year: Never true   Transportation Needs: No Transportation Needs (12/24/2024)    Received from [facility]    PRAPARE - Transportation     Lack of Transportation (Medical): No     Lack of Transportation (Non-Medical): No   Housing Stability: Low Risk  (12/24/2024)    [facility]    Housing Stability Vital Sign     Unable to Pay for Housing in the Last Year: No     Number of Times Moved in the Last Year: 0     Homeless in the Last Year: No        Allergies:  Review of patient's  allergies indicates:   Allergen Reactions    Metoclopramide hcl      Reglan    Tramadol Anaphylaxis        Objective:  Vitals:    05/16/25 1413   BP: (!) 148/96   Pulse:    Resp:    Temp:      General:  Well developed, well nourished adult female in no acute distress  Abdomen: Soft, nontender, no masses  Extremities:  No clubbing, cyanosis, or edema  Neurologic:  Grossly intact  Musculoskeletal:  Normal tone    Assessment:  1. Renal calculus, left  - POCT URINE DIPSTICK WITH MICROSCOPE, AUTOMATED  - Ambulatory referral/consult to Urology    2. Abnormal urine  - Urine Culture High Risk    3. Other urinary incontinence  - vibegron 75 mg Tab; Take 1 tablet (75 mg total) by mouth once daily.  Dispense: 30 tablet; Refill: 11    4. Atrophic kidney    5. Neurogenic bladder  - vibegron 75 mg Tab; Take 1 tablet (75 mg total) by mouth once daily.  Dispense: 30 tablet; Refill: 11     Plan:  She was referred to [facility] for resolution of the left renal calculi.  A referral was sent and I also communicated with the urologic resident there to facilitate her appointment.  Urine culture was sent today.  We will call and treat if that is positive.  She was given a refill of Gemtesa.  With the atrophic kidney the remaining kidney needs to be cleared of the stones before removing the stent.  Observation.    Follow-up tests needed:   None     Return appointment:  Six months or sooner if needed after her procedure in [place].               [1]   Patient Active Problem List  Diagnosis    Multiple sclerosis    Essential hypertension    Bipolar 1 disorder    Hypokalemia    History of pancreatitis    Leukocytosis    Hypercalcemia    Chronic pain    Alcohol abuse    Hyponatremia    Transaminitis    Hypomagnesemia    Hypothyroidism    Type 2 diabetes mellitus, without long-term current use of insulin    Recurrent UTI    Neurogenic bladder    Spastic diplegia, acquired, lower extremity    High risk medication use    LISA (stress  urinary incontinence, female)    Renal calculus

## 2025-05-17 LAB — BACTERIA UR CULT: NORMAL

## 2025-06-09 ENCOUNTER — PROCEDURE VISIT (OUTPATIENT)
Dept: NEUROLOGY | Facility: CLINIC | Age: 45
End: 2025-06-09
Payer: MEDICAID

## 2025-06-09 VITALS
OXYGEN SATURATION: 97 % | HEART RATE: 85 BPM | HEIGHT: 62 IN | WEIGHT: 117.94 LBS | DIASTOLIC BLOOD PRESSURE: 102 MMHG | SYSTOLIC BLOOD PRESSURE: 155 MMHG | RESPIRATION RATE: 20 BRPM | TEMPERATURE: 98 F | BODY MASS INDEX: 21.7 KG/M2

## 2025-06-09 DIAGNOSIS — G82.20 SPASTIC DIPLEGIA, ACQUIRED, LOWER EXTREMITY: Primary | ICD-10-CM

## 2025-06-09 PROCEDURE — 95874 GUIDE NERV DESTR NEEDLE EMG: CPT | Mod: 26,S$PBB,, | Performed by: PSYCHIATRY & NEUROLOGY

## 2025-06-09 PROCEDURE — 95874 GUIDE NERV DESTR NEEDLE EMG: CPT | Mod: PBBFAC | Performed by: PSYCHIATRY & NEUROLOGY

## 2025-06-09 PROCEDURE — 64642 CHEMODENERV 1 EXTREMITY 1-4: CPT | Mod: PBBFAC | Performed by: PSYCHIATRY & NEUROLOGY

## 2025-06-09 PROCEDURE — 64644 CHEMODENERV 1 EXTREM 5/> MUS: CPT | Mod: PBBFAC | Performed by: PSYCHIATRY & NEUROLOGY

## 2025-06-09 PROCEDURE — 64645 CHEMODENERV 1 EXTREM 5/> EA: CPT | Mod: PBBFAC | Performed by: PSYCHIATRY & NEUROLOGY

## 2025-06-09 PROCEDURE — 64643 CHEMODENERV 1 EXTREM 1-4 EA: CPT | Mod: PBBFAC | Performed by: PSYCHIATRY & NEUROLOGY

## 2025-06-09 PROCEDURE — 64644 CHEMODENERV 1 EXTREM 5/> MUS: CPT | Mod: S$PBB,,, | Performed by: PSYCHIATRY & NEUROLOGY

## 2025-06-09 PROCEDURE — 64645 CHEMODENERV 1 EXTREM 5/> EA: CPT | Mod: S$PBB,,, | Performed by: PSYCHIATRY & NEUROLOGY

## 2025-06-09 RX ORDER — BUPRENORPHINE 300 MG/1
300 SOLUTION SUBCUTANEOUS
COMMUNITY
Start: 2025-06-09

## 2025-06-09 RX ORDER — CYCLOBENZAPRINE HCL 10 MG
10 TABLET ORAL 2 TIMES DAILY PRN
COMMUNITY
Start: 2025-05-28

## 2025-06-09 RX ADMIN — ONABOTULINUMTOXINA 800 UNITS: 200 INJECTION, POWDER, LYOPHILIZED, FOR SOLUTION INTRADERMAL; INTRAMUSCULAR at 02:06

## 2025-06-09 NOTE — PROCEDURES
[facility] Neurology Outpatient Botox Procedure Note    History of Present Illness     This is 44 y.o. 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 Patient is here for Botox for spasticity for spastic diplegia. States now that she is completely dependent on  for IADL and ADLs due to inability to transfer.  is requesting a letter from medicaid stating that he is the primary care giver of the patient.     Review of System      reviewed. stable.    Focused Exam     Motor: increased left tone in LUE and bilateral LE, and bulk wnl throughout, no abnormal involuntary or voluntary movements, 5/5 to confrontation except for 4-/5 in LUE, 3/5 in L IP/EHL/TA, 4/5 Right IP, left pronator drift.     Assessment     This is 44 y.o. 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    EDSS 6.5     Procedure     Date of procedure: 6/9/2025    Procedure: Bilateral Lower Extremity Chemodenervation with Botulinum toxin, 12 muscles, EMG guided    The patient was identified and informed consent was reviewed with the patient, and we discussed the risks, benefits and alternatives.  Specifically, we discussed the risks of bleeding, infection and nerve injury with worsened pain and function. Specifically, we discussed the most frequently reported adverse reactions following injection of BOTOX for chronic migraine include muscular weakness (4%), musculoskeletal stiffness (4%), bronchitis (3%), injection-site pain (3%), musculoskeletal pain (3%), myalgia (3%), hypertension (2%), and muscle spasms (2%). The patient verbalized an understanding of these risks and the symptoms and the potentially catastrophic consequences of this occurrence. The  patient verbalized an understanding that if she should begin to have these symptoms that she should immediately go to the nearest emergency room for evaluation.  The patient was then positioned. The injection sites were identified and was prepped with Alcohol. 8 cc of preservative free normal saline was mixed with 800 units of Botox. A 25-gauge, 1.5 inch needle was then used. Muscles injected as below. Injection site confirmed via EMG. Patient tolerated the procedure well with no complaints.    Left  Gastrocnemius Lateral Head: 2 sites, 50 units   Gastrocnemius Medial Head: 2 sites, 50 units  Soleus: 2 sites, 60 units  Tibialis Posterior: 1 sites, 40 units  Flexor Hallucis Longus: 1 site, 30 units   Flexor Digitorum Longus: 1 site, 30 units       Right  Gastrocnemius Lateral Head: 2 sites, 50 units   Gastrocnemius Medial Head: 2 sites, 50 units  Soleus: 2 sites, 60 units  Tibialis Posterior: 2 sites, 50 units  Flexor Hallucis Longus: 1 site, 30 units   Flexor Digitorum Longus: 1 site, 30 units      Total Units: 530 units  Total Muscles Injected: 12        Follow Up       RTC office visit      Keila Lewis MD   [facility] General Neurology

## 2025-07-01 NOTE — PROGRESS NOTES
[facility] Neurology Follow Up Office Visit Note    Initial Visit Date: 7/31/2024  Last Visit Date: 7/31/2024  Current Visit Date:  07/03/2025    Chief Complaint:     Chief Complaint   Patient presents with    Multiple Sclerosis       History of Present Illness:      This is 44 y.o. 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 During last visit, labs were ordered. MRI Brain and C spine w/o Shelton was ordered. Botox was done for bilateral LE 6/9/2025.      Age of diagnosis: 22 years old      Presenting Symptoms/Progression: initially presented OD optic neuritis. Started having difficulty ambulating for at least 4-5 years, requiring a cane for at least 4-5 years. Has been progressively wheelchair bound for 2 years. Has neurogenic bladder, requiring diapers for the past 1 year. Also has recurrent UTI. Reports cognitive difficulties. Has been non compliant with DMD during earlier course of the disease. Now is able to get up to use the bathroom.     Risk Factors:   Tobacco use: Yes smoked 1/2 ppd up until 2-3 years ago. Now still vapting.   Alcohol use: Yes occasional  Cardiovascular exercise: Not Applicable  Chronic cardiovascular risk factors: Yes hypertension, DM II, CKD   Exposure to EBV: Not Applicable    Medications:     Current Disease Modifying Medication:   Ofatumamab 40 mg once per month (late 2020 to present): progression of disability to wheelchair bound.      Current CNS medications:   Not applicable.      Prior Disease Modifying Medications:   Natalizumab (5/2016 to 7/28/2020): ineffective   ?Cyclophosphamide   Avonex: ineffective   Copaxone: ineffective     Prior CNS medications:   Denied     Labs:       Latest Reference Range & Units 07/31/24 15:16    WBC 4.50 - 11.50 x10(3)/mcL 8.88   RBC 4.20 - 5.40 x10(6)/mcL 3.80 (L)   Hemoglobin 12.0 - 16.0  g/dL 12.4   Hematocrit 37.0 - 47.0 % 36.0 (L)   MCV 80.0 - 94.0 fL 94.7 (H)   MCH 27.0 - 31.0 pg 32.6 (H)   MCHC 33.0 - 36.0 g/dL 34.4   RDW 11.5 - 17.0 % 12.4   Platelet Count 130 - 400 x10(3)/mcL 351   MPV 7.4 - 10.4 fL 10.4   Neut % % 70.8   LYMPH % % 19.3   Mono % % 6.8   Eos % % 2.0   Basophil % % 0.6   Immature Granulocytes % 0.5   Neut # 2.1 - 9.2 x10(3)/mcL 6.30   Lymph # 0.6 - 4.6 x10(3)/mcL 1.71   Mono # 0.1 - 1.3 x10(3)/mcL 0.60   Eos # 0 - 0.9 x10(3)/mcL 0.18   Baso # <=0.2 x10(3)/mcL 0.05   Immature Grans (Abs) 0 - 0.04 x10(3)/mcL 0.04   nRBC % 0.0   Sodium 136 - 145 mmol/L 141   Potassium 3.5 - 5.1 mmol/L 3.9   Chloride 98 - 107 mmol/L 109 (H)   CO2 22 - 29 mmol/L 21 (L)   Anion Gap mEq/L 11.0   BUN 7.0 - 18.7 mg/dL 8.1   Creatinine 0.55 - 1.02 mg/dL 0.89   BUN/CREAT RATIO   9   eGFR mL/min/1.73/m2 >60   Glucose 74 - 100 mg/dL 197 (H)   Calcium 8.4 - 10.2 mg/dL 11.1 (H)   ALP 40 - 150 unit/L 136   PROTEIN TOTAL 6.4 - 8.3 gm/dL 6.4   Albumin 3.5 - 5.0 g/dL 3.9   Albumin/Globulin Ratio 1.1 - 2.0 ratio 1.6   BILIRUBIN TOTAL <=1.5 mg/dL 0.5   AST 5 - 34 unit/L 65 (H)   ALT 0 - 55 unit/L 78 (H)   Globulin, Total 2.4 - 3.5 gm/dL 2.5   IgG 522.00 - 1,631.00 mg/dL 609.00   IgM 33.0 - 293.0 mg/dL 29.0 (L)   IgA Level 65.0 - 421.0 mg/dL 148.0   Hep B C IgM Nonreactive  Nonreactive   Hep B Core Total Ab Nonreactive  Nonreactive   Hepatitis B Surface Ag Nonreactive  Nonreactive   Hepatitis C Ab Nonreactive  Nonreactive   QuantiFERON-Tb Gold Plus Result Negative  Negative   Mitogen minus Nil Result IU/mL 9.99   Nil Result IU/mL 0.01   TB1 Ag minus Nil Result IU/mL 0.21   TB2 Ag minus Nil Result IU/mL 0.12   (L): Data is abnormally low  (H): Data is abnormally high         Imaging:     MRI Brain w/o Shelton 10/17/2024 [facility]:  T2/FLAIR hyperintensities in the supratentorial white matter and periventricular white matter compati ble with the provided history of multiple sclerosis. There are also  lesions in the brainstem medulla oblongata, chano, cerebellar peduncles and midbrain, unchanged from the previous study. These are best seen on the axial T2-weighted sequences. Generalized cerebral volume loss greater than expected for  the patient's age. No hyperintensity seen in the optic nerves on the coronal FLAIR images. There are no new lesions compared to prior MRI from 1/19/2022. No restricted diffusion. No contrast was administered.     MRI C spine w/o Shelton 10/17/2024 at [facility]:   Vertebral body height, alignment, and marrow signal are normal.  Multiple spinal cord T2 hyperintense lesions from the cervical medullary junction down to the C7 and upper thoracic spinal cord. This exam is limited by motion artifact compared to the previous study. Given these limitations no new lesions identified. No contrast was administered. Mild degenerative disc disease and facet arthropathy without any significant spinal stenosis or foraminal stenosis.         MRI Brain w/o Shelton 8/20/2023:  I have reviewed the study independently and with the patient. Limited study. As per report, There is increase in the number of white matter lesions along the corpus callosum and periventricular white matter compared to the examination dated 11/29/2012.  There are stable lesions within the midbrain and the right brachium pontis.  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 There is a focus of T2/FLAIR signal hyperintensity within the left thalamus with no definitive diffusion restriction. No diffusion restriction is identified to suggest acute demyelination process. I am unable to access prior images.      MRI C spine +/- Shelton 8/20/2023: I have reviewed the study independently and with the patient. Extensive white matter T2 changes in C and T cord without contrast enhancement.     Review of Systems:     Review of Systems   All other systems reviewed and are negative.      Physical Exams:     Vitals:    07/03/25  1346   BP: 116/82   Pulse: 84   Resp: 18   Temp: 98 °F (36.7 °C)       Physical Exam  Vitals and nursing note reviewed.   Constitutional:       Appearance: Normal appearance.   HENT:      Head: Normocephalic and atraumatic.      Nose: Nose normal.      Mouth/Throat:      Mouth: Mucous membranes are moist.      Pharynx: Oropharynx is clear.   Eyes:      Conjunctiva/sclera: Conjunctivae normal.   Cardiovascular:      Rate and Rhythm: Normal rate and regular rhythm.      Pulses: Normal pulses.   Pulmonary:      Effort: Pulmonary effort is normal.      Breath sounds: Normal breath sounds.   Abdominal:      General: Abdomen is flat.   Musculoskeletal:         General: Normal range of motion.      Cervical back: Normal range of motion.   Skin:     General: Skin is warm.   Neurological:      Mental Status: She is alert.         Comprehensive Neurological Exam:  Mental Status: Alert Oriented to Self, Date, and Place. Comprehension wnl. No dysarthria.   CN II - XII: GIANLUCA, No APD, VFFC, No ptosis OU, EOMI with OS EVELIA, LT/Temp symmetric in CN V1-3 distribution, Hearing grossly intact, Face Symmetric, Tongue and Uvula midline, Trapezius symmetric bilateral.   Motor: increased left tone in LUE and bilateral LE, and bulk wnl throughout, no abnormal involuntary or voluntary movements, 5/5 to confrontation except for 4-/5 in LUE, 3/5 in L IP/EHL/TA, 4/5 Right IP, left pronator drift.   Sensory: Vibration absent in L foot, decreased in R foot, decreased in left hand, decreased temperature in left arm and bilateral LE, LLE > RLE.   Reflexes:  plantar reflexes present bilaterally.   Cerebellar: FNF wnl b/l, RAHM wnl b/l  Gait: wheelchair bound     Assessment:     This is 44 y.o. 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 KIM.     EDSS: 7.5    Problem List Items  Addressed This Visit          Neuro    Spastic diplegia, acquired, lower extremity       Renal/    Neurogenic bladder       Palliative Care    High risk medication use     Other Visit Diagnoses         Secondary progressive multiple sclerosis    -  Primary            Plan:     [] c/w ofatumamab 40 mg subcutaneous once per month   [] repeat MRI Brain and C spine w/o Shelton: [facility]  [] labs today  [] Botox bilateral Lower Extremity Protocol:  Left  Gastrocnemius Lateral Head: 2 sites, 50 units   Gastrocnemius Medial Head: 2 sites, 50 units  Soleus: 2 sites, 60 units  Tibialis Posterior: 1 sites, 40 units  Flexor Hallucis Longus: 1 site, 30 units   Flexor Digitorum Longus: 1 site, 30 units        Right  Gastrocnemius Lateral Head: 2 sites, 50 units   Gastrocnemius Medial Head: 2 sites, 50 units  Soleus: 2 sites, 60 units  Tibialis Posterior: 2 sites, 50 units  Flexor Hallucis Longus: 1 site, 30 units   Flexor Digitorum Longus: 1 site, 30 units        Total Units: 530 units  Total Muscles Injected: 12     RTC Botox     I have explained the treatment plan, diagnosis, and prognosis to patient. All questions are answered to the best of my knowledge.     Face to face time 40 minutes, including documentation, chart review, counseling, education, review of test results, relevant medical records, and coordination of care.

## 2025-07-03 ENCOUNTER — OFFICE VISIT (OUTPATIENT)
Dept: NEUROLOGY | Facility: CLINIC | Age: 45
End: 2025-07-03
Payer: MEDICAID

## 2025-07-03 VITALS
TEMPERATURE: 98 F | WEIGHT: 117 LBS | HEIGHT: 62 IN | RESPIRATION RATE: 18 BRPM | DIASTOLIC BLOOD PRESSURE: 82 MMHG | HEART RATE: 84 BPM | OXYGEN SATURATION: 97 % | SYSTOLIC BLOOD PRESSURE: 116 MMHG | BODY MASS INDEX: 21.53 KG/M2

## 2025-07-03 DIAGNOSIS — Z79.899 HIGH RISK MEDICATION USE: ICD-10-CM

## 2025-07-03 DIAGNOSIS — G35 SECONDARY PROGRESSIVE MULTIPLE SCLEROSIS: Primary | ICD-10-CM

## 2025-07-03 DIAGNOSIS — G82.20 SPASTIC DIPLEGIA, ACQUIRED, LOWER EXTREMITY: ICD-10-CM

## 2025-07-03 DIAGNOSIS — G35 MULTIPLE SCLEROSIS: ICD-10-CM

## 2025-07-03 DIAGNOSIS — N31.9 NEUROGENIC BLADDER: ICD-10-CM

## 2025-07-03 PROCEDURE — G2211 COMPLEX E/M VISIT ADD ON: HCPCS | Mod: ,,, | Performed by: PSYCHIATRY & NEUROLOGY

## 2025-07-03 PROCEDURE — 99215 OFFICE O/P EST HI 40 MIN: CPT | Mod: S$PBB,,, | Performed by: PSYCHIATRY & NEUROLOGY

## 2025-07-03 PROCEDURE — 99215 OFFICE O/P EST HI 40 MIN: CPT | Mod: PBBFAC | Performed by: PSYCHIATRY & NEUROLOGY

## 2025-07-03 RX ORDER — CHOLECALCIFEROL (VITAMIN D3) 1250 MCG
50000 CAPSULE ORAL
COMMUNITY
Start: 2025-06-23

## 2025-07-03 RX ORDER — OFATUMUMAB 20 MG/.4ML
INJECTION, SOLUTION SUBCUTANEOUS
COMMUNITY
Start: 2025-06-09

## 2025-07-03 RX ORDER — TAMSULOSIN HYDROCHLORIDE 0.4 MG/1
0.4 CAPSULE ORAL DAILY
COMMUNITY
Start: 2025-06-26 | End: 2025-07-17

## 2025-07-03 RX ORDER — IBUPROFEN 800 MG/1
800 TABLET, FILM COATED ORAL 2 TIMES DAILY
COMMUNITY

## 2025-07-08 DIAGNOSIS — R30.0 DYSURIA: Primary | ICD-10-CM

## 2025-07-09 ENCOUNTER — TELEPHONE (OUTPATIENT)
Dept: UROLOGY | Facility: CLINIC | Age: 45
End: 2025-07-09
Payer: MEDICAID

## 2025-07-09 DIAGNOSIS — R30.0 DYSURIA: Primary | ICD-10-CM

## 2025-07-09 DIAGNOSIS — Z01.818 PRE-OP TESTING: ICD-10-CM

## 2025-07-09 NOTE — TELEPHONE ENCOUNTER
Called & explained plan of care w/pt stating understanding & that she will get culture done on July 21st or 22nd.    ----- Message from Idalmis Holcomb DO sent at 7/8/2025 11:06 AM CDT -----  She is having surgery in [place] on 1 August and needs a urine culture prior so please call her and arrange for her to come in on 21 or 22 July to give a urine for culture so that we have plenty of time to treat her if necessary.

## 2025-08-14 DIAGNOSIS — G35 SECONDARY PROGRESSIVE MULTIPLE SCLEROSIS: Primary | ICD-10-CM

## 2025-08-14 RX ORDER — OFATUMUMAB 20 MG/.4ML
INJECTION, SOLUTION SUBCUTANEOUS
Qty: 0.4 ML | Refills: 5 | Status: SHIPPED | OUTPATIENT
Start: 2025-08-14

## 2025-08-28 ENCOUNTER — TELEPHONE (OUTPATIENT)
Dept: NEUROLOGY | Facility: CLINIC | Age: 45
End: 2025-08-28
Payer: MEDICAID

## 2025-08-29 ENCOUNTER — TELEPHONE (OUTPATIENT)
Dept: NEUROLOGY | Facility: CLINIC | Age: 45
End: 2025-08-29
Payer: MEDICAID

## 2025-09-04 ENCOUNTER — PROCEDURE VISIT (OUTPATIENT)
Dept: NEUROLOGY | Facility: CLINIC | Age: 45
End: 2025-09-04
Payer: MEDICAID

## 2025-09-04 VITALS
SYSTOLIC BLOOD PRESSURE: 105 MMHG | HEART RATE: 74 BPM | RESPIRATION RATE: 18 BRPM | WEIGHT: 130 LBS | HEIGHT: 62 IN | DIASTOLIC BLOOD PRESSURE: 71 MMHG | BODY MASS INDEX: 23.92 KG/M2 | TEMPERATURE: 98 F | OXYGEN SATURATION: 100 %

## 2025-09-04 DIAGNOSIS — G82.20 SPASTIC DIPLEGIA, ACQUIRED, LOWER EXTREMITY: Primary | ICD-10-CM

## 2025-09-04 PROCEDURE — 95874 GUIDE NERV DESTR NEEDLE EMG: CPT | Mod: PBBFAC | Performed by: PSYCHIATRY & NEUROLOGY

## 2025-09-04 PROCEDURE — 64643 CHEMODENERV 1 EXTREM 1-4 EA: CPT | Mod: PBBFAC | Performed by: PSYCHIATRY & NEUROLOGY

## 2025-09-04 PROCEDURE — 64645 CHEMODENERV 1 EXTREM 5/> EA: CPT | Mod: PBBFAC | Performed by: PSYCHIATRY & NEUROLOGY

## 2025-09-04 PROCEDURE — 64644 CHEMODENERV 1 EXTREM 5/> MUS: CPT | Mod: PBBFAC | Performed by: PSYCHIATRY & NEUROLOGY

## 2025-09-04 PROCEDURE — 64642 CHEMODENERV 1 EXTREMITY 1-4: CPT | Mod: PBBFAC | Performed by: PSYCHIATRY & NEUROLOGY

## 2025-09-04 RX ORDER — LEVOTHYROXINE SODIUM 50 UG/1
50 TABLET ORAL
COMMUNITY
Start: 2025-08-11

## (undated) DEVICE — TOWEL OR DISP STRL BLUE 4/PK

## (undated) DEVICE — POSITIONER HEAD SUPINE PINK

## (undated) DEVICE — TAPE SILK 3IN

## (undated) DEVICE — UNDERPAD ULTRASORB 300LB 30X36